# Patient Record
Sex: MALE | Race: WHITE | NOT HISPANIC OR LATINO | URBAN - METROPOLITAN AREA
[De-identification: names, ages, dates, MRNs, and addresses within clinical notes are randomized per-mention and may not be internally consistent; named-entity substitution may affect disease eponyms.]

---

## 2017-11-09 ENCOUNTER — INPATIENT (INPATIENT)
Facility: HOSPITAL | Age: 82
LOS: 18 days | Discharge: NURSING, REHAB OR LONG-TERM CARE | DRG: 329 | End: 2017-11-28
Attending: SURGERY | Admitting: HOSPITALIST
Payer: MEDICARE

## 2017-11-09 VITALS
DIASTOLIC BLOOD PRESSURE: 88 MMHG | SYSTOLIC BLOOD PRESSURE: 186 MMHG | TEMPERATURE: 98 F | RESPIRATION RATE: 19 BRPM | HEART RATE: 83 BPM | OXYGEN SATURATION: 98 %

## 2017-11-09 DIAGNOSIS — D62 ACUTE POSTHEMORRHAGIC ANEMIA: ICD-10-CM

## 2017-11-09 LAB
ALBUMIN SERPL ELPH-MCNC: 3.5 G/DL — SIGNIFICANT CHANGE UP (ref 3.3–5)
ALP SERPL-CCNC: 82 U/L — SIGNIFICANT CHANGE UP (ref 40–120)
ALT FLD-CCNC: 13 U/L RC — SIGNIFICANT CHANGE UP (ref 10–45)
ANION GAP SERPL CALC-SCNC: 14 MMOL/L — SIGNIFICANT CHANGE UP (ref 5–17)
APTT BLD: 29.1 SEC — SIGNIFICANT CHANGE UP (ref 27.5–37.4)
AST SERPL-CCNC: 23 U/L — SIGNIFICANT CHANGE UP (ref 10–40)
BASOPHILS # BLD AUTO: 0 K/UL — SIGNIFICANT CHANGE UP (ref 0–0.2)
BASOPHILS NFR BLD AUTO: 0.1 % — SIGNIFICANT CHANGE UP (ref 0–2)
BILIRUB SERPL-MCNC: 0.9 MG/DL — SIGNIFICANT CHANGE UP (ref 0.2–1.2)
BLD GP AB SCN SERPL QL: NEGATIVE — SIGNIFICANT CHANGE UP
BUN SERPL-MCNC: 20 MG/DL — SIGNIFICANT CHANGE UP (ref 7–23)
CALCIUM SERPL-MCNC: 9 MG/DL — SIGNIFICANT CHANGE UP (ref 8.4–10.5)
CHLORIDE SERPL-SCNC: 102 MMOL/L — SIGNIFICANT CHANGE UP (ref 96–108)
CO2 SERPL-SCNC: 23 MMOL/L — SIGNIFICANT CHANGE UP (ref 22–31)
CREAT SERPL-MCNC: 0.95 MG/DL — SIGNIFICANT CHANGE UP (ref 0.5–1.3)
EOSINOPHIL # BLD AUTO: 0.1 K/UL — SIGNIFICANT CHANGE UP (ref 0–0.5)
EOSINOPHIL NFR BLD AUTO: 0.9 % — SIGNIFICANT CHANGE UP (ref 0–6)
GLUCOSE SERPL-MCNC: 84 MG/DL — SIGNIFICANT CHANGE UP (ref 70–99)
HCT VFR BLD CALC: 29 % — LOW (ref 39–50)
HGB BLD-MCNC: 9.4 G/DL — LOW (ref 13–17)
INR BLD: 1.19 RATIO — HIGH (ref 0.88–1.16)
LIDOCAIN IGE QN: 26 U/L — SIGNIFICANT CHANGE UP (ref 7–60)
LYMPHOCYTES # BLD AUTO: 1.4 K/UL — SIGNIFICANT CHANGE UP (ref 1–3.3)
LYMPHOCYTES # BLD AUTO: 13.9 % — SIGNIFICANT CHANGE UP (ref 13–44)
MCHC RBC-ENTMCNC: 28.9 PG — SIGNIFICANT CHANGE UP (ref 27–34)
MCHC RBC-ENTMCNC: 32.2 GM/DL — SIGNIFICANT CHANGE UP (ref 32–36)
MCV RBC AUTO: 89.7 FL — SIGNIFICANT CHANGE UP (ref 80–100)
MONOCYTES # BLD AUTO: 0.6 K/UL — SIGNIFICANT CHANGE UP (ref 0–0.9)
MONOCYTES NFR BLD AUTO: 5.6 % — SIGNIFICANT CHANGE UP (ref 2–14)
NEUTROPHILS # BLD AUTO: 8 K/UL — HIGH (ref 1.8–7.4)
NEUTROPHILS NFR BLD AUTO: 79.3 % — HIGH (ref 43–77)
PLATELET # BLD AUTO: 274 K/UL — SIGNIFICANT CHANGE UP (ref 150–400)
POTASSIUM SERPL-MCNC: 5.2 MMOL/L — SIGNIFICANT CHANGE UP (ref 3.5–5.3)
POTASSIUM SERPL-SCNC: 5.2 MMOL/L — SIGNIFICANT CHANGE UP (ref 3.5–5.3)
PROT SERPL-MCNC: 6.1 G/DL — SIGNIFICANT CHANGE UP (ref 6–8.3)
PROTHROM AB SERPL-ACNC: 12.9 SEC — HIGH (ref 9.8–12.7)
RBC # BLD: 3.24 M/UL — LOW (ref 4.2–5.8)
RBC # FLD: 16 % — HIGH (ref 10.3–14.5)
RH IG SCN BLD-IMP: NEGATIVE — SIGNIFICANT CHANGE UP
SODIUM SERPL-SCNC: 139 MMOL/L — SIGNIFICANT CHANGE UP (ref 135–145)
WBC # BLD: 10.1 K/UL — SIGNIFICANT CHANGE UP (ref 3.8–10.5)
WBC # FLD AUTO: 10.1 K/UL — SIGNIFICANT CHANGE UP (ref 3.8–10.5)

## 2017-11-09 PROCEDURE — 38747 REMOVE ABDOMINAL LYMPH NODES: CPT | Mod: 59

## 2017-11-09 PROCEDURE — 99285 EMERGENCY DEPT VISIT HI MDM: CPT | Mod: 25,GC

## 2017-11-09 PROCEDURE — 49204: CPT

## 2017-11-09 PROCEDURE — 93010 ELECTROCARDIOGRAM REPORT: CPT

## 2017-11-09 PROCEDURE — 99223 1ST HOSP IP/OBS HIGH 75: CPT

## 2017-11-09 PROCEDURE — 44204 LAPARO PARTIAL COLECTOMY: CPT | Mod: 59

## 2017-11-09 PROCEDURE — 51702 INSERT TEMP BLADDER CATH: CPT | Mod: 59

## 2017-11-09 RX ORDER — TRAZODONE HCL 50 MG
25 TABLET ORAL ONCE
Qty: 0 | Refills: 0 | Status: COMPLETED | OUTPATIENT
Start: 2017-11-09 | End: 2017-11-10

## 2017-11-09 NOTE — H&P ADULT - PROBLEM SELECTOR PLAN 1
--suspect upper GI bleed  --patient currently refuseing colonscopy, but amenable to endoscopy  --pantoprazole 40mg IV BID  --trend Hgb q12h  --transfuse for Hgb < 7  --active type and screen, two large bore IVs  --iron studies  --NPO after MN  --GI c/s in AM

## 2017-11-09 NOTE — H&P ADULT - NSHPREVIEWOFSYSTEMS_GEN_ALL_CORE
CONSTITUTIONAL: No weakness, fevers or chills  EYES/ENT: No visual changes;  No dysphagia  NECK: No pain or stiffness  RESPIRATORY: No cough, wheezing, hemoptysis; No shortness of breath  CARDIOVASCULAR: No chest pain or palpitations; No lower extremity edema  GASTROINTESTINAL: No abdominal or epigastric pain. No nausea, vomiting, or hematemesis; No diarrhea or constipation. No melena or hematochezia.  BACK: No back pain  GENITOURINARY: No dysuria, frequency or hematuria  NEUROLOGICAL: No numbness or weakness  SKIN: No itching, burning, rashes, or lesions   All other review of systems is negative unless indicated above. CONSTITUTIONAL: No weakness, fevers or chills  EYES/ENT: No visual changes;  No dysphagia  NECK: No pain or stiffness  RESPIRATORY: No cough, wheezing, hemoptysis; No shortness of breath  CARDIOVASCULAR: No chest pain or palpitations; No lower extremity edema  GASTROINTESTINAL: +abdominal +epigastric pain. No nausea, vomiting, or hematemesis; No diarrhea or constipation. +melena  -hematochezia.  BACK: No back pain  GENITOURINARY: No dysuria, frequency or hematuria  NEUROLOGICAL: No numbness or weakness  SKIN: No itching, burning, rashes, or lesions   All other review of systems is negative unless indicated above.

## 2017-11-09 NOTE — ED PROVIDER NOTE - OBJECTIVE STATEMENT
89M PMH CAD s/p stents on ASA, plavix, HTN, HLD, possible CHF p/w 2 days of generalized abdominal pain associated with melena 89M PMH CAD s/p stents on ASA, plavix, HTN, HLD, possible CHF p/w 2 days of generalized abdominal pain associated with melena. No chest pain SOB fever chills cough

## 2017-11-09 NOTE — ED ADULT NURSE NOTE - OBJECTIVE STATEMENT
89 y.o male pmh HTN, cardiac stents presenting to ED accompanied by his son c/o generalized abdominal discomfort with diarrhea x 1 week. pt hard of hearing and verbalizes overall not feeling well and having generalized abdominal pain, son at bedside states that pt has been c/o diarrhea as well over the passed week. pt ambulatory without assistance. denies any fever, chills, nausea, vomiting, weakness. labs obtained. son remains at bedside. safety and fall precautions maintained. call bell at the bedside.

## 2017-11-09 NOTE — H&P ADULT - NSHPSOCIALHISTORY_GEN_ALL_CORE
, retired , served in [employer] during [employer].  3 children.  20 pack years, quit age of 40, glass of wine a day. , retired , served in [employer] during [employer]. Independent in ADLs. 3 children.  20 pack years, quit age of 40, glass of wine a day.

## 2017-11-09 NOTE — H&P ADULT - NSHPLABSRESULTS_GEN_ALL_CORE
Labs personally reviewed.                        9.4    10.1  )-----------( 274      ( 09 Nov 2017 20:00 )             29.0     11-09    139  |  102  |  20  ----------------------------<  84  5.2   |  23  |  0.95    Ca    9.0      09 Nov 2017 20:27    TPro  6.1  /  Alb  3.5  /  TBili  0.9  /  DBili  x   /  AST  23  /  ALT  13  /  AlkPhos  82  11-09    LIVER FUNCTIONS - ( 09 Nov 2017 20:27 )  Alb: 3.5 g/dL / Pro: 6.1 g/dL / ALK PHOS: 82 U/L / ALT: 13 U/L RC / AST: 23 U/L / GGT: x           PT/INR - ( 09 Nov 2017 19:59 )   PT: 12.9 sec;   INR: 1.19 ratio      PTT - ( 09 Nov 2017 19:59 )  PTT:29.1 sec    Imaging personally reviewed.      Tracing personally reviewed.

## 2017-11-09 NOTE — ED PROVIDER NOTE - MEDICAL DECISION MAKING DETAILS
89M CAD on ASA/ Plavix p/w melena and abd pain. No focal exam findings. Check CBC CMP Type and Screen, Coags. Likely admit. transfuse prn. HD stable currently. 89M CAD on ASA/ Plavix p/w melena and abd pain. No focal exam findings. Check CBC CMP Type and Screen, Coags. Likely admit. transfuse prn. HD stable currently.    BISI Howell MD: 89 y/o male with PMH CAD s/p stents on ASA, plavix, HTN, HLD, possible CHF p/w 2 days of abdominal pain and black stools. Per pt, he has had intermittent abd pain, epigastric x days.  He also c/o loose stools x 1 week and noticed it was black 2 days ago. Per son, pt had a w/u for anemia in [place], was advised to perform a colonoscopy, however, refused it. Pt today is amenable to endoscopy but not colonoscopy. Last colonoscopy 50 yrs ago. Denies dizziness, LH, CP, SOB, N/V, HA, cough.  Abdominal exam benign. Black, guaiac positive stool on rectal exam.  DDx: PUD, AVM, varices, other cause of GIB. Plan: basic labs, type and screen, PPI, likely admit for further w/u

## 2017-11-09 NOTE — H&P ADULT - ASSESSMENT
This is a 89 year old gentleman with history of CAD s/p stents (last 15 years ago) on ASA/plavix, HTN, HLD, possible CHF p/w 2 days of epigastric pain, melena.

## 2017-11-09 NOTE — H&P ADULT - HISTORY OF PRESENT ILLNESS
This is a 89 year old gentleman with history of CAD s/p stents on ASA/plavix, HTN, HLD, possible CHF p/w 2 days of generalized abdominal pain associated with melena. No chest pain SOB fever chills cough.    In ED, Tmax 36.6, HR 80, /80, satting well on RA.  Labs notable for WBC 10.1, Hgb 9.4, Plt 274, K 5.2, Cr 0.95, LFT WNL, INR 1.19.  Received This is a 89 year old gentleman with history of CAD s/p stents (last 15 years ago) on ASA/plavix, HTN, HLD, possible CHF p/w 2 days of epigastric pain, melena. Patient describes pain as intermittent, 8/10, not associated with eating.  He notes 3 episodes of soft black bowel movements, last ~ 16 hours ago. He denies light headedness, dizziness, SOB, chest pain, palpitations. Of note, patient shares his time between [place] and [place].  He recently came to [place] after having been worked up in [place] for anemia, found to be iron deficient.      In ED, Tmax 36.6, HR 80, /80, satting well on RA.  Labs notable for WBC 10.1, Hgb 9.4, Plt 274, K 5.2, Cr 0.95, LFT WNL, INR 1.19. This is a 89 year old gentleman with history of CAD s/p stents (last 15 years ago) on ASA/plavix, HTN, HLD, possible CHF p/w 2 days of epigastric pain, melena. Patient describes pain as intermittent, 8/10, not associated with eating.  He notes 3 episodes of soft black bowel movements, last ~ 16 hours ago. He denies light headedness, dizziness, SOB, chest pain, palpitations, nausea, vomiting, fevers, chills. Of note, patient shares his time between [place] and [place].  He recently came to [place] after having been worked up in [place] for anemia, found to be iron deficient.      In ED, Tmax 36.6, HR 80, /80, satting well on RA.  Labs notable for WBC 10.1, Hgb 9.4, Plt 274, K 5.2, Cr 0.95, LFT WNL, INR 1.19.

## 2017-11-09 NOTE — H&P ADULT - PROBLEM SELECTOR PLAN 2
--suspect gastric ulcer.  Pain currently resolved.  --if recurs, will send lactate, consider CT A/P to rule out ishemic bowel, though would expect hematochezia with bowel ischemia --suspect gastric ulcer. Symptoms and normal lipase suggest against pancreatitis. Also on DDx should be ischemic bowel, though would be more likely to cause hematochezia/BRBPR. Pain currently resolved.  --if recurs, will send lactate, consider CT A/P to rule out ishemic bowel, though would expect hematochezia with bowel ischemia

## 2017-11-09 NOTE — H&P ADULT - PROBLEM SELECTOR PLAN 4
--holding ASA, plavix in setting of possible bleed  --patient is many years away from stents, unclear why still on plavix

## 2017-11-10 DIAGNOSIS — R10.13 EPIGASTRIC PAIN: ICD-10-CM

## 2017-11-10 DIAGNOSIS — K92.1 MELENA: ICD-10-CM

## 2017-11-10 DIAGNOSIS — R63.8 OTHER SYMPTOMS AND SIGNS CONCERNING FOOD AND FLUID INTAKE: ICD-10-CM

## 2017-11-10 DIAGNOSIS — I25.10 ATHEROSCLEROTIC HEART DISEASE OF NATIVE CORONARY ARTERY WITHOUT ANGINA PECTORIS: ICD-10-CM

## 2017-11-10 DIAGNOSIS — Z29.9 ENCOUNTER FOR PROPHYLACTIC MEASURES, UNSPECIFIED: ICD-10-CM

## 2017-11-10 DIAGNOSIS — I10 ESSENTIAL (PRIMARY) HYPERTENSION: ICD-10-CM

## 2017-11-10 DIAGNOSIS — D64.9 ANEMIA, UNSPECIFIED: ICD-10-CM

## 2017-11-10 DIAGNOSIS — E78.5 HYPERLIPIDEMIA, UNSPECIFIED: ICD-10-CM

## 2017-11-10 LAB
ANION GAP SERPL CALC-SCNC: 12 MMOL/L — SIGNIFICANT CHANGE UP (ref 5–17)
BASOPHILS # BLD AUTO: 0.03 K/UL — SIGNIFICANT CHANGE UP (ref 0–0.2)
BASOPHILS NFR BLD AUTO: 0.4 % — SIGNIFICANT CHANGE UP (ref 0–2)
BUN SERPL-MCNC: 17 MG/DL — SIGNIFICANT CHANGE UP (ref 7–23)
CALCIUM SERPL-MCNC: 8.4 MG/DL — SIGNIFICANT CHANGE UP (ref 8.4–10.5)
CHLORIDE SERPL-SCNC: 105 MMOL/L — SIGNIFICANT CHANGE UP (ref 96–108)
CO2 SERPL-SCNC: 24 MMOL/L — SIGNIFICANT CHANGE UP (ref 22–31)
CREAT SERPL-MCNC: 0.98 MG/DL — SIGNIFICANT CHANGE UP (ref 0.5–1.3)
EOSINOPHIL # BLD AUTO: 0.17 K/UL — SIGNIFICANT CHANGE UP (ref 0–0.5)
EOSINOPHIL NFR BLD AUTO: 2.5 % — SIGNIFICANT CHANGE UP (ref 0–6)
GLUCOSE SERPL-MCNC: 75 MG/DL — SIGNIFICANT CHANGE UP (ref 70–99)
HCT VFR BLD CALC: 26.9 % — LOW (ref 39–50)
HCT VFR BLD CALC: 31.5 % — LOW (ref 39–50)
HGB BLD-MCNC: 8.3 G/DL — LOW (ref 13–17)
HGB BLD-MCNC: 9.9 G/DL — LOW (ref 13–17)
IMM GRANULOCYTES NFR BLD AUTO: 0.3 % — SIGNIFICANT CHANGE UP (ref 0–1.5)
LYMPHOCYTES # BLD AUTO: 1.02 K/UL — SIGNIFICANT CHANGE UP (ref 1–3.3)
LYMPHOCYTES # BLD AUTO: 15.2 % — SIGNIFICANT CHANGE UP (ref 13–44)
MCHC RBC-ENTMCNC: 26.6 PG — LOW (ref 27–34)
MCHC RBC-ENTMCNC: 27.3 PG — SIGNIFICANT CHANGE UP (ref 27–34)
MCHC RBC-ENTMCNC: 30.9 GM/DL — LOW (ref 32–36)
MCHC RBC-ENTMCNC: 31.4 GM/DL — LOW (ref 32–36)
MCV RBC AUTO: 86.2 FL — SIGNIFICANT CHANGE UP (ref 80–100)
MCV RBC AUTO: 86.8 FL — SIGNIFICANT CHANGE UP (ref 80–100)
MONOCYTES # BLD AUTO: 0.52 K/UL — SIGNIFICANT CHANGE UP (ref 0–0.9)
MONOCYTES NFR BLD AUTO: 7.7 % — SIGNIFICANT CHANGE UP (ref 2–14)
NEUTROPHILS # BLD AUTO: 4.95 K/UL — SIGNIFICANT CHANGE UP (ref 1.8–7.4)
NEUTROPHILS NFR BLD AUTO: 73.9 % — SIGNIFICANT CHANGE UP (ref 43–77)
PLATELET # BLD AUTO: 250 K/UL — SIGNIFICANT CHANGE UP (ref 150–400)
PLATELET # BLD AUTO: 300 K/UL — SIGNIFICANT CHANGE UP (ref 150–400)
POTASSIUM SERPL-MCNC: 4.2 MMOL/L — SIGNIFICANT CHANGE UP (ref 3.5–5.3)
POTASSIUM SERPL-SCNC: 4.2 MMOL/L — SIGNIFICANT CHANGE UP (ref 3.5–5.3)
RBC # BLD: 3.12 M/UL — LOW (ref 4.2–5.8)
RBC # BLD: 3.63 M/UL — LOW (ref 4.2–5.8)
RBC # FLD: 17.1 % — HIGH (ref 10.3–14.5)
RBC # FLD: 17.5 % — HIGH (ref 10.3–14.5)
SODIUM SERPL-SCNC: 141 MMOL/L — SIGNIFICANT CHANGE UP (ref 135–145)
WBC # BLD: 10.2 K/UL — SIGNIFICANT CHANGE UP (ref 3.8–10.5)
WBC # BLD: 6.71 K/UL — SIGNIFICANT CHANGE UP (ref 3.8–10.5)
WBC # FLD AUTO: 10.2 K/UL — SIGNIFICANT CHANGE UP (ref 3.8–10.5)
WBC # FLD AUTO: 6.71 K/UL — SIGNIFICANT CHANGE UP (ref 3.8–10.5)

## 2017-11-10 RX ORDER — FERROUS SULFATE 325(65) MG
1 TABLET ORAL
Qty: 0 | Refills: 0 | COMMUNITY

## 2017-11-10 RX ORDER — ISOSORBIDE MONONITRATE 60 MG/1
1 TABLET, EXTENDED RELEASE ORAL
Qty: 0 | Refills: 0 | COMMUNITY

## 2017-11-10 RX ORDER — ATORVASTATIN CALCIUM 80 MG/1
40 TABLET, FILM COATED ORAL AT BEDTIME
Qty: 0 | Refills: 0 | Status: DISCONTINUED | OUTPATIENT
Start: 2017-11-10 | End: 2017-11-20

## 2017-11-10 RX ORDER — FERROUS SULFATE 325(65) MG
325 TABLET ORAL
Qty: 0 | Refills: 0 | Status: DISCONTINUED | OUTPATIENT
Start: 2017-11-10 | End: 2017-11-20

## 2017-11-10 RX ORDER — FUROSEMIDE 40 MG
1 TABLET ORAL
Qty: 0 | Refills: 0 | COMMUNITY

## 2017-11-10 RX ORDER — ASCORBIC ACID 60 MG
500 TABLET,CHEWABLE ORAL DAILY
Qty: 0 | Refills: 0 | Status: DISCONTINUED | OUTPATIENT
Start: 2017-11-10 | End: 2017-11-20

## 2017-11-10 RX ORDER — ATORVASTATIN CALCIUM 80 MG/1
1 TABLET, FILM COATED ORAL
Qty: 0 | Refills: 0 | COMMUNITY

## 2017-11-10 RX ORDER — ASPIRIN/CALCIUM CARB/MAGNESIUM 324 MG
81 TABLET ORAL DAILY
Qty: 0 | Refills: 0 | Status: DISCONTINUED | OUTPATIENT
Start: 2017-11-10 | End: 2017-11-10

## 2017-11-10 RX ORDER — FUROSEMIDE 40 MG
20 TABLET ORAL EVERY OTHER DAY
Qty: 0 | Refills: 0 | Status: DISCONTINUED | OUTPATIENT
Start: 2017-11-10 | End: 2017-11-20

## 2017-11-10 RX ORDER — CLOPIDOGREL BISULFATE 75 MG/1
1 TABLET, FILM COATED ORAL
Qty: 0 | Refills: 0 | COMMUNITY

## 2017-11-10 RX ORDER — CARVEDILOL PHOSPHATE 80 MG/1
1 CAPSULE, EXTENDED RELEASE ORAL
Qty: 0 | Refills: 0 | COMMUNITY

## 2017-11-10 RX ORDER — DOCUSATE SODIUM 100 MG
1 CAPSULE ORAL
Qty: 0 | Refills: 0 | COMMUNITY

## 2017-11-10 RX ORDER — CARVEDILOL PHOSPHATE 80 MG/1
6.25 CAPSULE, EXTENDED RELEASE ORAL EVERY 12 HOURS
Qty: 0 | Refills: 0 | Status: DISCONTINUED | OUTPATIENT
Start: 2017-11-10 | End: 2017-11-20

## 2017-11-10 RX ORDER — PANTOPRAZOLE SODIUM 20 MG/1
40 TABLET, DELAYED RELEASE ORAL
Qty: 0 | Refills: 0 | Status: DISCONTINUED | OUTPATIENT
Start: 2017-11-10 | End: 2017-11-10

## 2017-11-10 RX ORDER — ASCORBIC ACID 60 MG
1 TABLET,CHEWABLE ORAL
Qty: 0 | Refills: 0 | COMMUNITY

## 2017-11-10 RX ADMIN — Medication 25 MILLIGRAM(S): at 22:28

## 2017-11-10 RX ADMIN — ATORVASTATIN CALCIUM 40 MILLIGRAM(S): 80 TABLET, FILM COATED ORAL at 22:28

## 2017-11-10 RX ADMIN — Medication 20 MILLIGRAM(S): at 19:43

## 2017-11-10 RX ADMIN — CARVEDILOL PHOSPHATE 6.25 MILLIGRAM(S): 80 CAPSULE, EXTENDED RELEASE ORAL at 07:03

## 2017-11-10 RX ADMIN — CARVEDILOL PHOSPHATE 6.25 MILLIGRAM(S): 80 CAPSULE, EXTENDED RELEASE ORAL at 19:45

## 2017-11-10 RX ADMIN — Medication 1 TABLET(S): at 19:42

## 2017-11-10 RX ADMIN — PANTOPRAZOLE SODIUM 40 MILLIGRAM(S): 20 TABLET, DELAYED RELEASE ORAL at 07:25

## 2017-11-10 RX ADMIN — Medication 325 MILLIGRAM(S): at 19:46

## 2017-11-10 NOTE — PROGRESS NOTE ADULT - SUBJECTIVE AND OBJECTIVE BOX
Pre-Endoscopy Evaluation      Referring Physician:   Dr. Solano                            Procedure: EGD    Indication for Procedure: GIB    Pertinent History:  89 year old male with history of CAD s/p CABG stents (last 15 years ago) on ASA/plavix, HTN, HLD, 5 days of dark stool melena. Patient describes pain as intermittent, 8/10, not associated with eating.        Sedation by Anesthesia [X]    PAST MEDICAL & SURGICAL HISTORY:  HLD (hyperlipidemia)  Hypertension  CAD (coronary artery disease)  No significant past surgical history      PMH of Gastroparesis [ ]  Gastric Surgery [ ]  Gastric Outlet Obstruction [ ]    Allergies:    No Known Allergies    Intolerances:    Latex allergy: [ ] yes [X] no    Medications:MEDICATIONS  (STANDING):  ascorbic acid 500 milliGRAM(s) Oral daily  atorvastatin 40 milliGRAM(s) Oral at bedtime  carvedilol 6.25 milliGRAM(s) Oral every 12 hours  ferrous    sulfate 325 milliGRAM(s) Oral two times a day with meals  furosemide    Tablet 20 milliGRAM(s) Oral every other day  multivitamin 1 Tablet(s) Oral daily  pantoprazole  Injectable 40 milliGRAM(s) IV Push two times a day  traZODone 25 milliGRAM(s) Oral once    MEDICATIONS  (PRN):      Smoking: [ ] yes  [X] no    AICD/PPM: [ ] yes   [X] no    Pertinent lab data:                        8.3    6.71  )-----------( 250      ( 10 Nov 2017 08:40 )             26.9     11-10    141  |  105  |  17  ----------------------------<  75  4.2   |  24  |  0.98    Ca    8.4      10 Nov 2017 08:34    TPro  6.1  /  Alb  3.5  /  TBili  0.9  /  DBili  x   /  AST  23  /  ALT  13  /  AlkPhos  82  11-09    PT/INR - ( 09 Nov 2017 19:59 )   PT: 12.9 sec;   INR: 1.19 ratio      PTT - ( 09 Nov 2017 19:59 )  PTT:29.1 sec      Physical Examination:     Daily   Vital Signs Last 24 Hrs  T(C): 36.5 (10 Nov 2017 06:35), Max: 36.6 (09 Nov 2017 18:23)  T(F): 97.7 (10 Nov 2017 06:35), Max: 97.8 (09 Nov 2017 18:23)  HR: 74 (10 Nov 2017 06:35) (74 - 88)  BP: 182/82 (10 Nov 2017 06:35) (168/80 - 186/88)  BP(mean): --  RR: 18 (10 Nov 2017 06:35) (18 - 19)  SpO2: 96% (10 Nov 2017 06:35) (96% - 99%)      Constitutional: NAD    Neck:  No JVD    Respiratory: CTAB/L    Cardiovascular: S1 and S2    Gastrointestinal: BS+, soft, NT/ND    Extremities: No peripheral edema    Neurological: A/O x 3, no focal deficits    : No Devine    Skin: No rashes    Comments:    ASA Class: I [ ]  II [ ]  III [ ]  IV [X]    The patient is a suitable candidate for the planned procedure unless box checked [ ]  No, explain:

## 2017-11-10 NOTE — PROGRESS NOTE ADULT - PROBLEM SELECTOR PLAN 1
--suspect upper GI bleed  -- s/p EGD with no clear etiology of melena.  -- unable to swallow the capsule.   -- c/w pantoprazole 40mg IV BID  -- trend Hgb q12h  -- transfuse if Hgb < 7  -- active type and screen, two large bore IVs  -- iron studies  -- GI eval appreciated.   -- Tentative plan for colonoscopy on Monday.   -- The sons to discuss.

## 2017-11-10 NOTE — CONSULT NOTE ADULT - SUBJECTIVE AND OBJECTIVE BOX
Patient is a 89y old  Male who presents with a chief complaint of melena, abdominal pain (09 Nov 2017 23:38)      HPI:  This is a 89 year old gentleman with history of CAD s/p CABG stents (last 15 years ago) on ASA/plavix, HTN, HLD, 5 days of dark stool melena. Patient describes pain as intermittent, 8/10, not associated with eating.  He notes 3 episodes of soft black bowel movements, last ~ 16 hours prior to admission. He denies light headedness, dizziness, SOB, chest pain, palpitations, nausea, vomiting, fevers, chills. Of note, patient shares his time between [place] and [place].  He recently came to [place] after having been worked up in [place] for anemia, found to be iron deficient.   History of hemorrhoidal bleeding 2016.  No recent EGD or colonoscopy.    Cardioloist called Dr Pierce ([phone]) Echo from 2015 stable from 2013 with normal EF, no stenotic valves. right carotid stenosis~ 50-60%.    Hb 9.4 MCV 88. unknown baseline.      PAST MEDICAL & SURGICAL HISTORY:  HLD (hyperlipidemia)  Hypertension  CAD (coronary artery disease)  No significant past surgical history      MEDICATIONS  (STANDING):  ascorbic acid 500 milliGRAM(s) Oral daily  atorvastatin 40 milliGRAM(s) Oral at bedtime  carvedilol 6.25 milliGRAM(s) Oral every 12 hours  ferrous    sulfate 325 milliGRAM(s) Oral two times a day with meals  furosemide    Tablet 20 milliGRAM(s) Oral every other day  multivitamin 1 Tablet(s) Oral daily  pantoprazole  Injectable 40 milliGRAM(s) IV Push two times a day  traZODone 25 milliGRAM(s) Oral once    MEDICATIONS  (PRN):      Allergies    No Known Allergies    Intolerances        Review of Systems:    General:  No wt loss, fevers, chills, night sweats,fatigue,   CV:  No pain, palpitations, hypo/hypertension  Resp:  No dyspnea, cough, tachypnea, wheezing  GI:  See HPI  :  No pain, bleeding, incontinence, nocturia  Muscle:  No pain, weakness  Neuro:  No weakness, tingling, memory problems. [place]  Psych:  No fatigue, insomnia, mood problems, depression  Endocrine:  No polyuria, polydypsia, cold/heat intolerance  Heme:  No petechiae, ecchymosis, easy bruisability  Skin:  No rash, tattoos, scars, edema      Vital Signs Last 24 Hrs  T(C): 36.5 (10 Nov 2017 06:35), Max: 36.6 (09 Nov 2017 18:23)  T(F): 97.7 (10 Nov 2017 06:35), Max: 97.8 (09 Nov 2017 18:23)  HR: 74 (10 Nov 2017 06:35) (74 - 88)  BP: 182/82 (10 Nov 2017 06:35) (168/80 - 186/88)  BP(mean): --  RR: 18 (10 Nov 2017 06:35) (18 - 19)  SpO2: 96% (10 Nov 2017 06:35) (96% - 99%)    PHYSICAL EXAM:    Constitutional: NAD, well-developed  Neck: No LAD, supple  Respiratory: CTA and P  Cardiovascular: S1 and S2, RRR, no M  Gastrointestinal: BS+, soft, NT/ND, neg HSM,  Extremities: No peripheral edema, neg clubbing, cyanosis  Vascular: 2+ peripheral pulses  Neurological: A/O x 3, Hard of hearing  Psychiatric: Normal mood, normal affect  Skin: No rashes      LABS:                        8.3    6.71  )-----------( 250      ( 10 Nov 2017 08:40 )             26.9                         9.4    10.1  )-----------( 274      ( 09 Nov 2017 20:00 )             29.0     11-09    139  |  102  |  20  ----------------------------<  84  5.2   |  23  |  0.95    Ca    9.0      09 Nov 2017 20:27    TPro  6.1  /  Alb  3.5  /  TBili  0.9  /  DBili  x   /  AST  23  /  ALT  13  /  AlkPhos  82  11-09    PT/INR - ( 09 Nov 2017 19:59 )   PT: 12.9 sec;   INR: 1.19 ratio         PTT - ( 09 Nov 2017 19:59 )  PTT:29.1 sec    LIVER FUNCTIONS - ( 09 Nov 2017 20:27 )  Alb: 3.5 g/dL / Pro: 6.1 g/dL / ALK PHOS: 82 U/L / ALT: 13 U/L RC / AST: 23 U/L / GGT: x           Lipase, Serum: 26 U/L (11-09-17 @ 20:27)        RADIOLOGY & ADDITIONAL TESTS:

## 2017-11-10 NOTE — CONSULT NOTE ADULT - ASSESSMENT
Melena in the setting of CAD ASA/Plavix use. R/O upper GI source.  Antiplatelet agents held on PPI IV BID.    Cardiac status reviewed with good cardiac function.    Maintain NPO. Plan for EGD for evaluation today.  CBC q12h.

## 2017-11-10 NOTE — PROGRESS NOTE ADULT - SUBJECTIVE AND OBJECTIVE BOX
Patient is a 89y old  Male who presents with a chief complaint of melena, abdominal pain (09 Nov 2017 23:38)      SUBJECTIVE / OVERNIGHT EVENTS:  Pt seen and examined at bedside. Feels well.   No chest pain, no shortness of breath.   No overnight event. s/p EGD this afternoon. Didn't tolerate capsule study. unable to swallow the camera.   No N/V/D. No abdominal pain.  The son at bedside.       Vital Signs Last 24 Hrs  T(C): 36.5 (10 Nov 2017 14:34), Max: 36.6 (09 Nov 2017 18:23)  T(F): 97.7 (10 Nov 2017 14:34), Max: 97.8 (09 Nov 2017 18:23)  HR: 81 (10 Nov 2017 14:34) (74 - 88)  BP: 150/76 (10 Nov 2017 14:34) (150/76 - 186/88)  BP(mean): --  RR: 81 (10 Nov 2017 14:34) (18 - 81)  SpO2: 96% (10 Nov 2017 14:34) (96% - 99%)  I&O's Summary    09 Nov 2017 07:01  -  10 Nov 2017 07:00  --------------------------------------------------------  IN: 120 mL / OUT: 400 mL / NET: -280 mL        PHYSICAL EXAM:  GENERAL: NAD, Comfortable  HEAD:  Atraumatic, Normocephalic  EYES: EOMI, PERRLA, conjunctiva and sclera clear  NECK: Supple, No JVD  CHEST/LUNG: Clear to auscultation bilaterally; No wheeze  HEART: Regular rate and rhythm; No murmurs, rubs, or gallops  ABDOMEN: Soft, Nontender, Nondistended; Bowel sounds present  Neuro: AAO x 2-3, no focal deficit, 5/5 b/l extremities  EXTREMITIES:  2+ Peripheral Pulses, No clubbing, cyanosis, or edema  SKIN: No rashes or lesions    LABS:                        8.3    6.71  )-----------( 250      ( 10 Nov 2017 08:40 )             26.9     11-10    141  |  105  |  17  ----------------------------<  75  4.2   |  24  |  0.98    Ca    8.4      10 Nov 2017 08:34    TPro  6.1  /  Alb  3.5  /  TBili  0.9  /  DBili  x   /  AST  23  /  ALT  13  /  AlkPhos  82  11-09    PT/INR - ( 09 Nov 2017 19:59 )   PT: 12.9 sec;   INR: 1.19 ratio         PTT - ( 09 Nov 2017 19:59 )  PTT:29.1 sec  CAPILLARY BLOOD GLUCOSE                RADIOLOGY & ADDITIONAL TESTS:    Imaging Personally Reviewed:  [x] YES  [ ] NO    Consultant(s) Notes Reviewed:  [x] YES  [ ] NO      MEDICATIONS  (STANDING):  ascorbic acid 500 milliGRAM(s) Oral daily  atorvastatin 40 milliGRAM(s) Oral at bedtime  carvedilol 6.25 milliGRAM(s) Oral every 12 hours  ferrous    sulfate 325 milliGRAM(s) Oral two times a day with meals  furosemide    Tablet 20 milliGRAM(s) Oral every other day  multivitamin 1 Tablet(s) Oral daily  traZODone 25 milliGRAM(s) Oral once    MEDICATIONS  (PRN):      Care Discussed with Consultants/Other Providers [x] YES  [ ] NO    HEALTH ISSUES - PROBLEM Dx:  Nutrition, metabolism, and development symptoms: Nutrition, metabolism, and development symptoms  Prophylactic measure: Prophylactic measure  Essential hypertension: Essential hypertension  HLD (hyperlipidemia): HLD (hyperlipidemia)  CAD (coronary artery disease): CAD (coronary artery disease)  Anemia: Anemia  Epigastric abdominal pain: Epigastric abdominal pain  Melena: Melena

## 2017-11-10 NOTE — PROGRESS NOTE ADULT - ASSESSMENT
89 year old gentleman with history of CAD s/p stents (last 15 years ago) on ASA/plavix, HTN, HLD, possible CHF p/w 2 days of epigastric pain, melena.

## 2017-11-11 LAB
ANION GAP SERPL CALC-SCNC: 13 MMOL/L — SIGNIFICANT CHANGE UP (ref 5–17)
BUN SERPL-MCNC: 18 MG/DL — SIGNIFICANT CHANGE UP (ref 7–23)
CALCIUM SERPL-MCNC: 7.9 MG/DL — LOW (ref 8.4–10.5)
CHLORIDE SERPL-SCNC: 101 MMOL/L — SIGNIFICANT CHANGE UP (ref 96–108)
CO2 SERPL-SCNC: 24 MMOL/L — SIGNIFICANT CHANGE UP (ref 22–31)
CREAT SERPL-MCNC: 0.99 MG/DL — SIGNIFICANT CHANGE UP (ref 0.5–1.3)
GLUCOSE SERPL-MCNC: 93 MG/DL — SIGNIFICANT CHANGE UP (ref 70–99)
HCT VFR BLD CALC: 28.8 % — LOW (ref 39–50)
HGB BLD-MCNC: 8.8 G/DL — LOW (ref 13–17)
MCHC RBC-ENTMCNC: 26.7 PG — LOW (ref 27–34)
MCHC RBC-ENTMCNC: 30.6 GM/DL — LOW (ref 32–36)
MCV RBC AUTO: 87.3 FL — SIGNIFICANT CHANGE UP (ref 80–100)
PLATELET # BLD AUTO: 266 K/UL — SIGNIFICANT CHANGE UP (ref 150–400)
POTASSIUM SERPL-MCNC: 4 MMOL/L — SIGNIFICANT CHANGE UP (ref 3.5–5.3)
POTASSIUM SERPL-SCNC: 4 MMOL/L — SIGNIFICANT CHANGE UP (ref 3.5–5.3)
RBC # BLD: 3.3 M/UL — LOW (ref 4.2–5.8)
RBC # FLD: 16.9 % — HIGH (ref 10.3–14.5)
SODIUM SERPL-SCNC: 138 MMOL/L — SIGNIFICANT CHANGE UP (ref 135–145)
WBC # BLD: 10.67 K/UL — HIGH (ref 3.8–10.5)
WBC # FLD AUTO: 10.67 K/UL — HIGH (ref 3.8–10.5)

## 2017-11-11 RX ADMIN — Medication 325 MILLIGRAM(S): at 18:06

## 2017-11-11 RX ADMIN — CARVEDILOL PHOSPHATE 6.25 MILLIGRAM(S): 80 CAPSULE, EXTENDED RELEASE ORAL at 18:06

## 2017-11-11 RX ADMIN — Medication 1 TABLET(S): at 12:20

## 2017-11-11 RX ADMIN — CARVEDILOL PHOSPHATE 6.25 MILLIGRAM(S): 80 CAPSULE, EXTENDED RELEASE ORAL at 06:28

## 2017-11-11 RX ADMIN — Medication 325 MILLIGRAM(S): at 09:05

## 2017-11-11 RX ADMIN — ATORVASTATIN CALCIUM 40 MILLIGRAM(S): 80 TABLET, FILM COATED ORAL at 22:45

## 2017-11-11 RX ADMIN — Medication 500 MILLIGRAM(S): at 12:20

## 2017-11-11 NOTE — PROGRESS NOTE ADULT - SUBJECTIVE AND OBJECTIVE BOX
INTERVAL HPI/OVERNIGHT EVENTS:  feels well no complaints. no bm.     MEDICATIONS  (STANDING):  ascorbic acid 500 milliGRAM(s) Oral daily  atorvastatin 40 milliGRAM(s) Oral at bedtime  carvedilol 6.25 milliGRAM(s) Oral every 12 hours  ferrous    sulfate 325 milliGRAM(s) Oral two times a day with meals  furosemide    Tablet 20 milliGRAM(s) Oral every other day  multivitamin 1 Tablet(s) Oral daily    MEDICATIONS  (PRN):      Allergies    No Known Allergies    Intolerances        Vital Signs Last 24 Hrs  T(C): 36.4 (11 Nov 2017 13:30), Max: 36.8 (11 Nov 2017 06:22)  T(F): 97.5 (11 Nov 2017 13:30), Max: 98.3 (11 Nov 2017 06:22)  HR: 59 (11 Nov 2017 13:30) (55 - 81)  BP: 133/63 (11 Nov 2017 13:30) (110/61 - 150/76)  BP(mean): --  RR: 18 (11 Nov 2017 13:30) (18 - 81)  SpO2: 99% (11 Nov 2017 13:30) (95% - 99%)    PHYSICAL EXAM:  General: comfortable in No acute distress  CV: regular rate and rhythm. no murmurs rubs or gallops  Lungs: clear to ascultation bilaterally  abdomen: soft nontender nondistened normal bowel sounds  ext: negative edema  skin: no rashes noted          LABS:                        8.8    10.67 )-----------( 266      ( 11 Nov 2017 08:26 )             28.8     11-11    138  |  101  |  18  ----------------------------<  93  4.0   |  24  |  0.99    Ca    7.9<L>      11 Nov 2017 08:37    TPro  6.1  /  Alb  3.5  /  TBili  0.9  /  DBili  x   /  AST  23  /  ALT  13  /  AlkPhos  82  11-09    PT/INR - ( 09 Nov 2017 19:59 )   PT: 12.9 sec;   INR: 1.19 ratio         PTT - ( 09 Nov 2017 19:59 )  PTT:29.1 sec    LIVER FUNCTIONS - ( 09 Nov 2017 20:27 )  Alb: 3.5 g/dL / Pro: 6.1 g/dL / ALK PHOS: 82 U/L / ALT: 13 U/L RC / AST: 23 U/L / GGT: x           Hemoglobin: 8.8 g/dL (11-11-17 @ 08:26)  Hemoglobin: 9.9 g/dL (11-10-17 @ 21:04)  Hemoglobin: 8.3 g/dL (11-10-17 @ 08:40)  Hemoglobin: 9.4 g/dL (11-09-17 @ 20:00)    RADIOLOGY & ADDITIONAL TESTS:

## 2017-11-11 NOTE — PHYSICAL THERAPY INITIAL EVALUATION ADULT - PERTINENT HX OF CURRENT PROBLEM, REHAB EVAL
This is a 89 year old gentleman p/w 2 days of epigastric pain, melena. Patient describes pain as intermittent, 8/10, not associated with eating.  He notes 3 episodes of soft black bowel movements..

## 2017-11-11 NOTE — PHYSICAL THERAPY INITIAL EVALUATION ADULT - ADDITIONAL COMMENTS
resides in pvt house with son ,4 steps to get in. IND ambulation indoor /out door without assistive device.

## 2017-11-11 NOTE — PROGRESS NOTE ADULT - ASSESSMENT
89 year old man with melena 2nd to gi bleed. negative egd  given known stents requiring antiplatelet agents as well as overall good health even given age appropriate to proceed with colonoscopy and possible egd with placement of capsule for monday  liquid diet tomorrow  will order prep tomorrow  trend cbc daily  patient and son agree with plan.   call with questions [phone]

## 2017-11-11 NOTE — PROGRESS NOTE ADULT - SUBJECTIVE AND OBJECTIVE BOX
Patient is a 89y old  Male who presents with a chief complaint of melena, abdominal pain (09 Nov 2017 23:38)      SUBJECTIVE / OVERNIGHT EVENTS:  Pt seen and examined at bedside.   No overnight event. Doing well. No further BM.   Feeling better.  no cp, no sob, no n/v/d.   Hgb remained stable.   The son at bedside.         Vital Signs Last 24 Hrs  T(C): 36.4 (11 Nov 2017 13:30), Max: 36.8 (11 Nov 2017 06:22)  T(F): 97.5 (11 Nov 2017 13:30), Max: 98.3 (11 Nov 2017 06:22)  HR: 59 (11 Nov 2017 13:30) (55 - 81)  BP: 133/63 (11 Nov 2017 13:30) (110/61 - 150/76)  BP(mean): --  RR: 18 (11 Nov 2017 13:30) (18 - 81)  SpO2: 99% (11 Nov 2017 13:30) (95% - 99%)  I&O's Summary    10 Nov 2017 07:01  -  11 Nov 2017 07:00  --------------------------------------------------------  IN: 240 mL / OUT: 800 mL / NET: -560 mL    11 Nov 2017 07:01  -  11 Nov 2017 14:32  --------------------------------------------------------  IN: 480 mL / OUT: 100 mL / NET: 380 mL        PHYSICAL EXAM:  GENERAL: NAD, Comfortable  HEAD:  Atraumatic, Normocephalic  EYES: EOMI, PERRLA, conjunctiva and sclera clear  NECK: Supple, No JVD  CHEST/LUNG: Clear to auscultation bilaterally; No wheeze  HEART: Regular rate and rhythm; No murmurs, rubs, or gallops  ABDOMEN: Soft, Nontender, Nondistended; Bowel sounds present  Neuro: AAO x 3, no focal deficit, 5/5 b/l extremities  EXTREMITIES:  2+ Peripheral Pulses, No clubbing, cyanosis, or edema  SKIN: No rashes or lesions    LABS:                        8.8    10.67 )-----------( 266      ( 11 Nov 2017 08:26 )             28.8     11-11    138  |  101  |  18  ----------------------------<  93  4.0   |  24  |  0.99    Ca    7.9<L>      11 Nov 2017 08:37    TPro  6.1  /  Alb  3.5  /  TBili  0.9  /  DBili  x   /  AST  23  /  ALT  13  /  AlkPhos  82  11-09    PT/INR - ( 09 Nov 2017 19:59 )   PT: 12.9 sec;   INR: 1.19 ratio         PTT - ( 09 Nov 2017 19:59 )  PTT:29.1 sec  CAPILLARY BLOOD GLUCOSE                RADIOLOGY & ADDITIONAL TESTS:    Imaging Personally Reviewed:  [x] YES  [ ] NO    Consultant(s) Notes Reviewed:  [x] YES  [ ] NO      MEDICATIONS  (STANDING):  ascorbic acid 500 milliGRAM(s) Oral daily  atorvastatin 40 milliGRAM(s) Oral at bedtime  carvedilol 6.25 milliGRAM(s) Oral every 12 hours  ferrous    sulfate 325 milliGRAM(s) Oral two times a day with meals  furosemide    Tablet 20 milliGRAM(s) Oral every other day  multivitamin 1 Tablet(s) Oral daily    MEDICATIONS  (PRN):      Care Discussed with Consultants/Other Providers [x] YES  [ ] NO    HEALTH ISSUES - PROBLEM Dx:  Nutrition, metabolism, and development symptoms: Nutrition, metabolism, and development symptoms  Prophylactic measure: Prophylactic measure  Essential hypertension: Essential hypertension  HLD (hyperlipidemia): HLD (hyperlipidemia)  CAD (coronary artery disease): CAD (coronary artery disease)  Anemia: Anemia  Epigastric abdominal pain: Epigastric abdominal pain  Melena: Melena

## 2017-11-11 NOTE — PROGRESS NOTE ADULT - PROBLEM SELECTOR PLAN 1
-- s/p EGD with no clear etiology of melena.  -- unable to swallow the capsule.   -- c/w pantoprazole 40mg IV BID  -- trend Hgb qdaily  -- transfuse if Hgb < 7  -- active type and screen, two large bore IVs  -- iron studies  -- GI eval appreciated.   -- Tentative plan for colonoscopy on Monday.  plus EGD with capsule placement.   The family and pt agreeable.

## 2017-11-12 RX ORDER — SOD SULF/SODIUM/NAHCO3/KCL/PEG
2000 SOLUTION, RECONSTITUTED, ORAL ORAL ONCE
Qty: 0 | Refills: 0 | Status: DISCONTINUED | OUTPATIENT
Start: 2017-11-12 | End: 2017-11-12

## 2017-11-12 RX ORDER — SOD SULF/SODIUM/NAHCO3/KCL/PEG
2000 SOLUTION, RECONSTITUTED, ORAL ORAL ONCE
Qty: 0 | Refills: 0 | Status: COMPLETED | OUTPATIENT
Start: 2017-11-12 | End: 2017-11-12

## 2017-11-12 RX ADMIN — CARVEDILOL PHOSPHATE 6.25 MILLIGRAM(S): 80 CAPSULE, EXTENDED RELEASE ORAL at 06:50

## 2017-11-12 RX ADMIN — Medication 20 MILLIGRAM(S): at 11:37

## 2017-11-12 RX ADMIN — Medication 500 MILLIGRAM(S): at 11:37

## 2017-11-12 RX ADMIN — ATORVASTATIN CALCIUM 40 MILLIGRAM(S): 80 TABLET, FILM COATED ORAL at 21:49

## 2017-11-12 RX ADMIN — Medication 325 MILLIGRAM(S): at 09:15

## 2017-11-12 RX ADMIN — Medication 1 TABLET(S): at 11:36

## 2017-11-12 RX ADMIN — Medication 325 MILLIGRAM(S): at 18:24

## 2017-11-12 RX ADMIN — Medication 2000 MILLILITER(S): at 18:25

## 2017-11-12 RX ADMIN — CARVEDILOL PHOSPHATE 6.25 MILLIGRAM(S): 80 CAPSULE, EXTENDED RELEASE ORAL at 18:24

## 2017-11-12 NOTE — PROVIDER CONTACT NOTE (OTHER) - RECOMMENDATIONS
Requesting change of diet from regular to clear liquid in preparation for colonoscopy on Monday 11/13

## 2017-11-12 NOTE — PROGRESS NOTE ADULT - SUBJECTIVE AND OBJECTIVE BOX
Patient is a 89y old  Male who presents with a chief complaint of melena, abdominal pain (09 Nov 2017 23:38)      SUBJECTIVE / OVERNIGHT EVENTS:  No overnight events.  No complaints. No melena.  No cp, sob, abdominal pain.  No n/v/d. no HA/dizziness.   The son and the family at bedside.     Vital Signs Last 24 Hrs  T(C): 36.3 (12 Nov 2017 19:52), Max: 36.7 (12 Nov 2017 04:28)  T(F): 97.4 (12 Nov 2017 19:52), Max: 98.1 (12 Nov 2017 04:28)  HR: 72 (12 Nov 2017 19:52) (50 - 72)  BP: 144/75 (12 Nov 2017 19:52) (119/56 - 144/75)  BP(mean): --  RR: 18 (12 Nov 2017 19:52) (18 - 18)  SpO2: 99% (12 Nov 2017 19:52) (96% - 100%)  I&O's Summary    11 Nov 2017 07:01  -  12 Nov 2017 07:00  --------------------------------------------------------  IN: 1020 mL / OUT: 100 mL / NET: 920 mL    12 Nov 2017 07:01  -  12 Nov 2017 20:12  --------------------------------------------------------  IN: 1960 mL / OUT: 0 mL / NET: 1960 mL        PHYSICAL EXAM:  GENERAL: NAD, Comfortable  HEAD:  Atraumatic, Normocephalic  EYES: EOMI, PERRLA, conjunctiva and sclera clear  NECK: Supple, No JVD  CHEST/LUNG: Clear to auscultation bilaterally; No wheeze  HEART: Regular rate and rhythm; No murmurs, rubs, or gallops  ABDOMEN: Soft, Nontender, Nondistended; Bowel sounds present  Neuro: AAO x 3, no focal deficit, 5/5 b/l extremities  EXTREMITIES:  2+ Peripheral Pulses, No clubbing, cyanosis, or edema  SKIN: No rashes or lesions    LABS:                        8.8    10.67 )-----------( 266      ( 11 Nov 2017 08:26 )             28.8     11-11    138  |  101  |  18  ----------------------------<  93  4.0   |  24  |  0.99    Ca    7.9<L>      11 Nov 2017 08:37        CAPILLARY BLOOD GLUCOSE                RADIOLOGY & ADDITIONAL TESTS:    Imaging Personally Reviewed:  [x] YES  [ ] NO    Consultant(s) Notes Reviewed:  [x] YES  [ ] NO      MEDICATIONS  (STANDING):  ascorbic acid 500 milliGRAM(s) Oral daily  atorvastatin 40 milliGRAM(s) Oral at bedtime  carvedilol 6.25 milliGRAM(s) Oral every 12 hours  ferrous    sulfate 325 milliGRAM(s) Oral two times a day with meals  furosemide    Tablet 20 milliGRAM(s) Oral every other day  multivitamin 1 Tablet(s) Oral daily    MEDICATIONS  (PRN):      Care Discussed with Consultants/Other Providers [x] YES  [ ] NO    HEALTH ISSUES - PROBLEM Dx:  Nutrition, metabolism, and development symptoms: Nutrition, metabolism, and development symptoms  Prophylactic measure: Prophylactic measure  Essential hypertension: Essential hypertension  HLD (hyperlipidemia): HLD (hyperlipidemia)  CAD (coronary artery disease): CAD (coronary artery disease)  Anemia: Anemia  Epigastric abdominal pain: Epigastric abdominal pain  Melena: Melena

## 2017-11-12 NOTE — PROGRESS NOTE ADULT - PROBLEM SELECTOR PLAN 1
-- s/p EGD with no clear etiology.  -- unable to swallow the capsule.   -- c/w pantoprazole 40mg IV BID  -- trend Hgb qdaily  -- transfuse if Hgb < 7  -- active type and screen, two large bore IVs  -- iron studies  -- GI eval appreciated.   -- Tentative plan for colonoscopy on Monday.  plus EGD with capsule placement.

## 2017-11-13 ENCOUNTER — RESULT REVIEW (OUTPATIENT)
Age: 82
End: 2017-11-13

## 2017-11-13 LAB
FERRITIN SERPL-MCNC: 54 NG/ML — SIGNIFICANT CHANGE UP (ref 30–400)
IRON SATN MFR SERPL: 12 UG/DL — LOW (ref 45–165)
IRON SATN MFR SERPL: 6 % — LOW (ref 16–55)
TIBC SERPL-MCNC: 214 UG/DL — LOW (ref 220–430)
UIBC SERPL-MCNC: 202 UG/DL — SIGNIFICANT CHANGE UP (ref 110–370)
VIT B12 SERPL-MCNC: 850 PG/ML — SIGNIFICANT CHANGE UP (ref 243–894)

## 2017-11-13 RX ADMIN — ATORVASTATIN CALCIUM 40 MILLIGRAM(S): 80 TABLET, FILM COATED ORAL at 21:45

## 2017-11-13 RX ADMIN — CARVEDILOL PHOSPHATE 6.25 MILLIGRAM(S): 80 CAPSULE, EXTENDED RELEASE ORAL at 05:00

## 2017-11-13 RX ADMIN — Medication 325 MILLIGRAM(S): at 08:04

## 2017-11-13 RX ADMIN — Medication 500 MILLIGRAM(S): at 11:24

## 2017-11-13 RX ADMIN — Medication 1 TABLET(S): at 11:24

## 2017-11-13 RX ADMIN — CARVEDILOL PHOSPHATE 6.25 MILLIGRAM(S): 80 CAPSULE, EXTENDED RELEASE ORAL at 18:36

## 2017-11-13 NOTE — PROGRESS NOTE ADULT - PROBLEM SELECTOR PLAN 1
-- s/p EGD with no clear etiology.  -- unable to swallow the capsule.   -- c/w pantoprazole 40mg IV BID  -- trend Hgb qdaily  -- transfuse if Hgb < 7  -- active type and screen, two large bore IVs  -- iron studies  -- GI eval appreciated.   Colonoscopy today  plus EGD with capsule placement.  Hgb stable so far.

## 2017-11-13 NOTE — PROGRESS NOTE ADULT - SUBJECTIVE AND OBJECTIVE BOX
Pre-Endoscopy Evaluation      Referring Physician:  Dr. Solano                                Procedure: Egd/Colonscopy    Indication for Procedure: GIB    Pertinent History: 89 year old male with history of CAD s/p CABG stents (last 15 years ago) on ASA/plavix, HTN, HLD   CHF presents with epigastric pain and melena. s/p EGD with no bleeding source identified, patient was unable to tolerate Capsule    Sedation by Anesthesia [x]    PAST MEDICAL & SURGICAL HISTORY:  HLD (hyperlipidemia)  Hypertension  CAD (coronary artery disease)  CABG  Coronary stents  No significant past surgical history      PMH of Gastroparesis [ ]  Gastric Surgery [ ]  Gastric Outlet Obstruction [ ]    Allergies:    No Known Allergies    Intolerances:    Latex allergy: [ ] yes [x] no    Medications:MEDICATIONS  (STANDING):  ascorbic acid 500 milliGRAM(s) Oral daily  atorvastatin 40 milliGRAM(s) Oral at bedtime  carvedilol 6.25 milliGRAM(s) Oral every 12 hours  ferrous    sulfate 325 milliGRAM(s) Oral two times a day with meals  furosemide    Tablet 20 milliGRAM(s) Oral every other day  multivitamin 1 Tablet(s) Oral daily    MEDICATIONS  (PRN):      Smoking: [ ] yes  [x] no    AICD/PPM: [ ] yes   [x] no    Pertinent lab data:                        9.4    8.2   )-----------( 247      ( 13 Nov 2017 10:37 )             28.8     11-13    144  |  107  |  13  ----------------------------<  93  4.6   |  27  |  0.99    Ca    8.8      13 Nov 2017 10:38        Physical Examination:    Daily   Vital Signs Last 24 Hrs  T(C): 36.3 (13 Nov 2017 13:04), Max: 36.4 (12 Nov 2017 16:48)  T(F): 97.4 (13 Nov 2017 13:04), Max: 97.5 (12 Nov 2017 16:48)  HR: 69 (13 Nov 2017 13:04) (69 - 79)  BP: 161/72 (13 Nov 2017 13:04) (127/70 - 161/72)  BP(mean): --  RR: 18 (13 Nov 2017 13:04) (18 - 18)  SpO2: 99% (13 Nov 2017 13:04) (99% - 100%)      Constitutional: NAD    Neck:  No JVD    Respiratory: CTAB/L    Cardiovascular: S1 and S2    Gastrointestinal: BS+, soft, NT/ND    Extremities: No peripheral edema    Neurological: A/O x 3    : No Devine    Skin: No rashes    Comments:    ASA Class: I [ ]  II [ ]  III [x]  IV [ ]    The patient is a suitable candidate for the planned procedure unless box checked [ ]  No, explain:

## 2017-11-13 NOTE — PROGRESS NOTE ADULT - SUBJECTIVE AND OBJECTIVE BOX
Patient is a 89y old  Male who presents with a chief complaint of melena, abdominal pain (09 Nov 2017 23:38)      SUBJECTIVE / OVERNIGHT EVENTS:  Pt seen and examined at bedside.   No overnight event.  Feel tired after bowel prep.  no cp, no sob, no n/v/d.   Still have melena.   The son at bedside.       Vital Signs Last 24 Hrs  T(C): 36.3 (13 Nov 2017 13:04), Max: 36.4 (12 Nov 2017 16:48)  T(F): 97.4 (13 Nov 2017 13:04), Max: 97.5 (12 Nov 2017 16:48)  HR: 69 (13 Nov 2017 13:04) (69 - 79)  BP: 161/72 (13 Nov 2017 13:04) (127/70 - 161/72)  BP(mean): --  RR: 18 (13 Nov 2017 13:04) (18 - 18)  SpO2: 99% (13 Nov 2017 13:04) (99% - 100%)  I&O's Summary    12 Nov 2017 07:01  -  13 Nov 2017 07:00  --------------------------------------------------------  IN: 3200 mL / OUT: 300 mL / NET: 2900 mL        PHYSICAL EXAM:  GENERAL: NAD, Comfortable  HEAD:  Atraumatic, Normocephalic  EYES: EOMI, PERRLA, conjunctiva and sclera clear  NECK: Supple, No JVD  CHEST/LUNG: Clear to auscultation bilaterally; No wheeze  HEART: Regular rate and rhythm; No murmurs, rubs, or gallops  ABDOMEN: Soft, Nontender, Nondistended; Bowel sounds present  Neuro: AAO x 2-3, no focal deficit, 5/5 b/l extremities  EXTREMITIES:  2+ Peripheral Pulses, No clubbing, cyanosis, or edema  SKIN: No rashes or lesions    LABS:                        9.4    8.2   )-----------( 247      ( 13 Nov 2017 10:37 )             28.8     11-13    144  |  107  |  13  ----------------------------<  93  4.6   |  27  |  0.99    Ca    8.8      13 Nov 2017 10:38        CAPILLARY BLOOD GLUCOSE                RADIOLOGY & ADDITIONAL TESTS:    Imaging Personally Reviewed:  [x] YES  [ ] NO    Consultant(s) Notes Reviewed:  [x] YES  [ ] NO      MEDICATIONS  (STANDING):  ascorbic acid 500 milliGRAM(s) Oral daily  atorvastatin 40 milliGRAM(s) Oral at bedtime  carvedilol 6.25 milliGRAM(s) Oral every 12 hours  ferrous    sulfate 325 milliGRAM(s) Oral two times a day with meals  furosemide    Tablet 20 milliGRAM(s) Oral every other day  multivitamin 1 Tablet(s) Oral daily    MEDICATIONS  (PRN):      Care Discussed with Consultants/Other Providers [x] YES  [ ] NO    HEALTH ISSUES - PROBLEM Dx:  Nutrition, metabolism, and development symptoms: Nutrition, metabolism, and development symptoms  Prophylactic measure: Prophylactic measure  Essential hypertension: Essential hypertension  HLD (hyperlipidemia): HLD (hyperlipidemia)  CAD (coronary artery disease): CAD (coronary artery disease)  Anemia: Anemia  Epigastric abdominal pain: Epigastric abdominal pain  Melena: Melena

## 2017-11-14 LAB
HCT VFR BLD CALC: 29.9 % — LOW (ref 39–50)
HGB BLD-MCNC: 9.2 G/DL — LOW (ref 13–17)
MCHC RBC-ENTMCNC: 26.8 PG — LOW (ref 27–34)
MCHC RBC-ENTMCNC: 30.8 GM/DL — LOW (ref 32–36)
MCV RBC AUTO: 87.2 FL — SIGNIFICANT CHANGE UP (ref 80–100)
PLATELET # BLD AUTO: 269 K/UL — SIGNIFICANT CHANGE UP (ref 150–400)
RBC # BLD: 3.43 M/UL — LOW (ref 4.2–5.8)
RBC # FLD: 17.4 % — HIGH (ref 10.3–14.5)
SURGICAL PATHOLOGY STUDY: SIGNIFICANT CHANGE UP
WBC # BLD: 11.57 K/UL — HIGH (ref 3.8–10.5)
WBC # FLD AUTO: 11.57 K/UL — HIGH (ref 3.8–10.5)

## 2017-11-14 PROCEDURE — 74177 CT ABD & PELVIS W/CONTRAST: CPT | Mod: 26

## 2017-11-14 PROCEDURE — 71260 CT THORAX DX C+: CPT | Mod: 26

## 2017-11-14 RX ORDER — IRON SUCROSE 20 MG/ML
100 INJECTION, SOLUTION INTRAVENOUS ONCE
Qty: 0 | Refills: 0 | Status: COMPLETED | OUTPATIENT
Start: 2017-11-14 | End: 2017-11-14

## 2017-11-14 RX ADMIN — ATORVASTATIN CALCIUM 40 MILLIGRAM(S): 80 TABLET, FILM COATED ORAL at 21:19

## 2017-11-14 RX ADMIN — IRON SUCROSE 210 MILLIGRAM(S): 20 INJECTION, SOLUTION INTRAVENOUS at 20:08

## 2017-11-14 RX ADMIN — CARVEDILOL PHOSPHATE 6.25 MILLIGRAM(S): 80 CAPSULE, EXTENDED RELEASE ORAL at 17:35

## 2017-11-14 RX ADMIN — CARVEDILOL PHOSPHATE 6.25 MILLIGRAM(S): 80 CAPSULE, EXTENDED RELEASE ORAL at 05:39

## 2017-11-14 RX ADMIN — Medication 325 MILLIGRAM(S): at 12:28

## 2017-11-14 RX ADMIN — Medication 500 MILLIGRAM(S): at 12:28

## 2017-11-14 RX ADMIN — Medication 325 MILLIGRAM(S): at 17:35

## 2017-11-14 RX ADMIN — Medication 1 TABLET(S): at 12:28

## 2017-11-14 RX ADMIN — Medication 20 MILLIGRAM(S): at 12:28

## 2017-11-14 NOTE — PROGRESS NOTE ADULT - PROBLEM SELECTOR PLAN 1
-- s/p EGD with no clear etiology.  -- c/w pantoprazole 40mg IV BID  -- hgb stable.   -- transfuse if Hgb < 7  --  very iron deficient. Iron supplemented   -- GI eval appreciated.   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   -- Had extensive discussion with the son (conservative approach vs. aggressive approach discussed. The son and the daughter in law at bedside.  Risk and benefits explained. Not ready to make a decision yet.  CT abdomen/chest to see if there is metastasis.  Will re-visit the discussion tomorrow after CT results.   Oncology Dr. Kothari consulted.  Discussed with the pt and family. All questions answered.

## 2017-11-14 NOTE — PROGRESS NOTE ADULT - SUBJECTIVE AND OBJECTIVE BOX
INTERVAL HPI/OVERNIGHT EVENTS:  feels well. no pain. melenic stool this am after colonoscop yesterday with biopsies. tolerating diet    MEDICATIONS  (STANDING):  ascorbic acid 500 milliGRAM(s) Oral daily  atorvastatin 40 milliGRAM(s) Oral at bedtime  carvedilol 6.25 milliGRAM(s) Oral every 12 hours  ferrous    sulfate 325 milliGRAM(s) Oral two times a day with meals  furosemide    Tablet 20 milliGRAM(s) Oral every other day  multivitamin 1 Tablet(s) Oral daily    MEDICATIONS  (PRN):      Allergies    No Known Allergies    Intolerances      Vital Signs Last 24 Hrs  T(C): 36.4 (14 Nov 2017 05:09), Max: 36.7 (13 Nov 2017 20:44)  T(F): 97.6 (14 Nov 2017 05:09), Max: 98.1 (13 Nov 2017 20:44)  HR: 82 (14 Nov 2017 05:09) (50 - 82)  BP: 160/66 (14 Nov 2017 05:09) (105/54 - 198/67)  BP(mean): --  RR: 17 (14 Nov 2017 05:09) (17 - 18)  SpO2: 97% (14 Nov 2017 05:09) (95% - 99%)      PHYSICAL EXAM:  General: comfortable in No acute distress  CV: regular rate and rhythm. no murmurs rubs or gallops  Lungs: clear to ascultation bilaterally  abdomen: soft nontender nondistened normal bowel sounds  ext: negative edema  skin: no rashes noted        LABS:                        9.2    11.57 )-----------( 269      ( 14 Nov 2017 07:37 )             29.9     11-13    144  |  107  |  13  ----------------------------<  93  4.6   |  27  |  0.99    Ca    8.8      13 Nov 2017 10:38          LIVER FUNCTIONS - ( 09 Nov 2017 20:27 )  Alb: 3.5 g/dL / Pro: 6.1 g/dL / ALK PHOS: 82 U/L / ALT: 13 U/L RC / AST: 23 U/L / GGT: x             RADIOLOGY & ADDITIONAL TESTS:

## 2017-11-14 NOTE — PROGRESS NOTE ADULT - ASSESSMENT
89 year old man with anemia and gi bleed 2nd to ascending colon growth. high suspicion for malignancy  s/p biopsies  anticipate pathology to reveal malignancy. I favor surgery as optimal management for lesion even with patients age and h/o CABG 15 years ago. Given his age risk of surgery as higher as well.    Family to discuss plan and options for Tashi.   will follow along  if family does nto want any furhter intervention. HGb currently stable and no furhter inpatient workup warranted.   attempt made to call keeley will call again  call with questions [phone]

## 2017-11-14 NOTE — PROGRESS NOTE ADULT - SUBJECTIVE AND OBJECTIVE BOX
Patient is a 89y old  Male who presents with a chief complaint of melena, abdominal pain (09 Nov 2017 23:38)      SUBJECTIVE / OVERNIGHT EVENTS:  Pt seen and examined at bedside. Feels well.   No chest pain, no shortness of breath.   No overnight event. + melena. + BM. no diarrhea.   No N/V/D. No abdominal pain.       Vital Signs Last 24 Hrs  T(C): 36.4 (14 Nov 2017 11:21), Max: 36.7 (13 Nov 2017 20:44)  T(F): 97.6 (14 Nov 2017 11:21), Max: 98.1 (13 Nov 2017 20:44)  HR: 82 (14 Nov 2017 11:21) (50 - 82)  BP: 160/80 (14 Nov 2017 11:21) (105/54 - 198/67)  BP(mean): --  RR: 18 (14 Nov 2017 11:21) (17 - 18)  SpO2: 97% (14 Nov 2017 11:21) (95% - 98%)  I&O's Summary    13 Nov 2017 07:01  -  14 Nov 2017 07:00  --------------------------------------------------------  IN: 240 mL / OUT: 100 mL / NET: 140 mL    14 Nov 2017 07:01  -  14 Nov 2017 16:11  --------------------------------------------------------  IN: 400 mL / OUT: 0 mL / NET: 400 mL        PHYSICAL EXAM:  GENERAL: NAD, Comfortable  HEAD:  Atraumatic, Normocephalic  EYES: EOMI, PERRLA, conjunctiva and sclera clear  NECK: Supple, No JVD  CHEST/LUNG: Clear to auscultation bilaterally; No wheeze  HEART: Regular rate and rhythm; No murmurs, rubs, or gallops  ABDOMEN: Soft, Nontender, Nondistended; Bowel sounds present  Neuro: AAO x 2-3, no focal deficit, 5/5 b/l extremities  EXTREMITIES:  2+ Peripheral Pulses, No clubbing, cyanosis, or edema  SKIN: No rashes or lesions    LABS:                        9.2    11.57 )-----------( 269      ( 14 Nov 2017 07:37 )             29.9     11-13    144  |  107  |  13  ----------------------------<  93  4.6   |  27  |  0.99    Ca    8.8      13 Nov 2017 10:38        CAPILLARY BLOOD GLUCOSE                RADIOLOGY & ADDITIONAL TESTS:    Imaging Personally Reviewed:  [x] YES  [ ] NO    Consultant(s) Notes Reviewed:  [x] YES  [ ] NO      MEDICATIONS  (STANDING):  ascorbic acid 500 milliGRAM(s) Oral daily  atorvastatin 40 milliGRAM(s) Oral at bedtime  carvedilol 6.25 milliGRAM(s) Oral every 12 hours  ferrous    sulfate 325 milliGRAM(s) Oral two times a day with meals  furosemide    Tablet 20 milliGRAM(s) Oral every other day  iron sucrose IVPB 100 milliGRAM(s) IV Intermittent once  multivitamin 1 Tablet(s) Oral daily    MEDICATIONS  (PRN):      Care Discussed with Consultants/Other Providers [x] YES  [ ] NO    HEALTH ISSUES - PROBLEM Dx:  Nutrition, metabolism, and development symptoms: Nutrition, metabolism, and development symptoms  Prophylactic measure: Prophylactic measure  Essential hypertension: Essential hypertension  HLD (hyperlipidemia): HLD (hyperlipidemia)  CAD (coronary artery disease): CAD (coronary artery disease)  Anemia: Anemia  Epigastric abdominal pain: Epigastric abdominal pain  Melena: Melena

## 2017-11-14 NOTE — PROGRESS NOTE ADULT - ATTENDING COMMENTS
Dr. Kwon will be covering me starting 11/15/17. Please paged her at  if needed.     - Dr. JACQUI Solano ([facility])  - [phone]

## 2017-11-15 DIAGNOSIS — C18.9 MALIGNANT NEOPLASM OF COLON, UNSPECIFIED: ICD-10-CM

## 2017-11-15 PROCEDURE — 99223 1ST HOSP IP/OBS HIGH 75: CPT | Mod: 24

## 2017-11-15 RX ADMIN — Medication 1 TABLET(S): at 11:17

## 2017-11-15 RX ADMIN — Medication 500 MILLIGRAM(S): at 11:17

## 2017-11-15 RX ADMIN — ATORVASTATIN CALCIUM 40 MILLIGRAM(S): 80 TABLET, FILM COATED ORAL at 21:16

## 2017-11-15 RX ADMIN — CARVEDILOL PHOSPHATE 6.25 MILLIGRAM(S): 80 CAPSULE, EXTENDED RELEASE ORAL at 05:04

## 2017-11-15 RX ADMIN — Medication 325 MILLIGRAM(S): at 10:09

## 2017-11-15 RX ADMIN — CARVEDILOL PHOSPHATE 6.25 MILLIGRAM(S): 80 CAPSULE, EXTENDED RELEASE ORAL at 17:12

## 2017-11-15 RX ADMIN — Medication 325 MILLIGRAM(S): at 17:12

## 2017-11-15 NOTE — CONSULT NOTE ADULT - SUBJECTIVE AND OBJECTIVE BOX
89M PMHx CAD s/p stents, HTN, HLD, and CHF who presented to [facility] on 11/9/17 complaining of epigastric pain & melena of 2 days duration. He reported his pain as intermittent, 8/10, without radiation, not associated w/ food. He also reports 2 episodes of dark stools the day of presentation Of note, he reports he was recently diagnosed with iron deficiency in [place].  Denies no weight loss, decreased appetite, CP, SOB, light headedness, N/V, fevers chills. He was admitted to Medicine for further evaluation. GI was consulted and performed upper GI endoscopy (11/10) & colonoscopy (11/11) that revealed a mass in the ascending colon, w/ biopsy showing invasive adenocarcinoma. CT showed a large R colon mass, but did not reveal any evidence of metastatic disease.    Surgical Oncology was consulted to evaluate for resection.       PMHx: HLD (hyperlipidemia)  Hypertension  CAD (coronary artery disease)    PSHx: No significant past surgical history    Home Medications:  aspirin 325 mg oral tablet: 1 tab(s) orally once a day (10 Nov 2017 10:43)  atorvastatin 40 mg oral tablet: 1 tab(s) orally once a day (10 Nov 2017 10:43)  carvedilol 6.25 mg oral tablet: 1 tab(s) orally 2 times a day (10 Nov 2017 10:43)  clopidogrel 75 mg oral tablet: 1 tab(s) orally once a day (10 Nov 2017 10:43)  docusate sodium 100 mg oral capsule: 1 cap(s) orally once a day, As Needed (10 Nov 2017 10:43)  ferrous sulfate 325 mg (65 mg elemental iron) oral tablet: 1 tab(s) orally 2 times a day (10 Nov 2017 10:43)  furosemide 20 mg oral tablet: 1 tab(s) orally every other day (10 Nov 2017 10:43)  isosorbide mononitrate 30 mg oral tablet, extended release: 1 tab(s) orally once a day (10 Nov 2017 10:43)  Multiple Vitamins oral tablet: 1 tab(s) orally once a day (10 Nov 2017 10:43)  Vitamin C 500 mg oral tablet: 1 tab(s) orally once a day (10 Nov 2017 10:43)      Medications (inpatient): ascorbic acid 500 milliGRAM(s) Oral daily  atorvastatin 40 milliGRAM(s) Oral at bedtime  carvedilol 6.25 milliGRAM(s) Oral every 12 hours  ferrous    sulfate 325 milliGRAM(s) Oral two times a day with meals  furosemide    Tablet 20 milliGRAM(s) Oral every other day  multivitamin 1 Tablet(s) Oral daily    Medications (PRN):  Allergies: No Known Allergies    Social Hx: lives between [place] & [place]. Former tobacco smoker. EtOH: 1 drink daily.    Physical Exam  T(C): 36.7 (11-15-17 @ 12:15)  HR: 83 (11-15-17 @ 17:11) (58 - 83)  BP: 114/60 (11-15-17 @ 17:11) (114/60 - 144/77)  RR: 18 (11-15-17 @ 12:15) (18 - 18)  SpO2: 95% (11-15-17 @ 12:15) (94% - 95%)  Wt(kg): --  Tmax: T(C): , Max: 36.7 (11-15-17 @ 12:15)  Wt(kg): --    11-14-17  -  11-15-17  --------------------------------------------------------  IN:    IV PiggyBack: 100 mL    Oral Fluid: 880 mL  Total IN: 980 mL    OUT:  Total OUT: 0 mL    Total NET: 980 mL      11-15-17  -  11-15-17  --------------------------------------------------------  IN:    Oral Fluid: 480 mL  Total IN: 480 mL    OUT:  Total OUT: 0 mL    Total NET: 480 mL        General: well developed, well nourished, NAD  Neuro: alert and oriented, no focal deficits, moves all extremities spontaneously  HEENT: NCAT, EOMI, anicteric, mucosa moist  Respiratory: airway patent, respirations unlabored  CVS: regular rate and rhythm  Abdomen: soft, nontender, nondistended  Extremities: no edema, sensation and movement grossly intact  Skin: warm, dry, appropriate color    Labs:                        9.2    11.57 )-----------( 269      ( 14 Nov 2017 07:37 )             29.9           Imaging and other studies:  < from: CT Abdomen and Pelvis w/ Oral Cont and w/ IV Cont (11.14.17 @ 19:20) >  IMPRESSION: Large right colon mass. No evidence of metastatic disease.   Moderate pleural effusions.    < end of copied text >    < from: Upper Endoscopy (11.10.17 @ 11:10) >  Findings:     The esophagus was normal.       The stomach was normal.       The examined duodenum was normal.    < end of copied text >      < from: Colonoscopy (11.13.17 @ 15:44) >  Impression:          - Likely malignant partially obstructingtumor in the ascending colon.                        Biopsied. Tattooed.                       - Diverticulosis in the sigmoid colon, in the descending colon, in the                        transverse colon and in the ascending colon.       - The examined portion of the ileum was normal.                       - Internal hemorrhoids.    < end of copied text >      Surgical Pathology Report (11.13.17 @ 17:00)    Surgical Pathology Report:   ACCESSION No:  [id number]    MICHELLE HERRERA                     1        Surgical Final Report          Final Diagnosis  1     Ascending colon mass biopsy:  - Invasive adenocarcinoma.    Note: Dr. Hayes has reviewed this case and concurred the diagnosis.    Verified by: Kristen Dasilva M.D.  (Electronic Signature)  Reported on: 11/14/17 17:57 [facility],[address] [place] [address]  [id number]  _________________________________________________________________    Clinical History  Melena ascending colon mass    Specimen(s) Submitted  1     Ascending colon mass bx    Gross Description  The specimen is received in formalin and the specimen container  is labeled: Ascending colon mass biopsy.  It  consists of six  fragments of soft, tan-pink tissue ranging from 0.2 to 0.8 cm in  maximum dimension.  Entirely submitted.  One cassette.    In addition to other data that may appear on the specimen  container, the label has been inspected to confirm the presence  of the patient's name and date of birth.  [facility] 11/14/17 01:20 89M PMHx CAD s/p stents, HTN, HLD, and CHF who presented to [facility] ED on 11/9/17 complaining of epigastric pain & melena going on a long time. He reported his pain as intermittent, 8/10, without radiation, not associated w/ food. He also reports 2 episodes of dark stools the day of presentation Of note, he reports he was recently diagnosed with iron deficiency in [place].  Denies no weight loss, decreased appetite, CP, SOB, light headedness, N/V, fevers chills. He was admitted to Medicine for further evaluation. GI was consulted and performed upper GI endoscopy (11/10) & colonoscopy (11/11) that revealed a mass in the ascending colon, w/ biopsy showing invasive adenocarcinoma. CT showed a large R colon mass, but did not reveal any evidence of metastatic disease.    Surgical Oncology was consulted to evaluate for resection.       PMHx: HLD (hyperlipidemia)  Hypertension  CAD (coronary artery disease)    PSHx: No significant past surgical history    Home Medications:  aspirin 325 mg oral tablet: 1 tab(s) orally once a day (10 Nov 2017 10:43)  atorvastatin 40 mg oral tablet: 1 tab(s) orally once a day (10 Nov 2017 10:43)  carvedilol 6.25 mg oral tablet: 1 tab(s) orally 2 times a day (10 Nov 2017 10:43)  clopidogrel 75 mg oral tablet: 1 tab(s) orally once a day (10 Nov 2017 10:43)  docusate sodium 100 mg oral capsule: 1 cap(s) orally once a day, As Needed (10 Nov 2017 10:43)  ferrous sulfate 325 mg (65 mg elemental iron) oral tablet: 1 tab(s) orally 2 times a day (10 Nov 2017 10:43)  furosemide 20 mg oral tablet: 1 tab(s) orally every other day (10 Nov 2017 10:43)  isosorbide mononitrate 30 mg oral tablet, extended release: 1 tab(s) orally once a day (10 Nov 2017 10:43)  Multiple Vitamins oral tablet: 1 tab(s) orally once a day (10 Nov 2017 10:43)  Vitamin C 500 mg oral tablet: 1 tab(s) orally once a day (10 Nov 2017 10:43)      Medications (inpatient): ascorbic acid 500 milliGRAM(s) Oral daily  atorvastatin 40 milliGRAM(s) Oral at bedtime  carvedilol 6.25 milliGRAM(s) Oral every 12 hours  ferrous    sulfate 325 milliGRAM(s) Oral two times a day with meals  furosemide    Tablet 20 milliGRAM(s) Oral every other day  multivitamin 1 Tablet(s) Oral daily    Medications (PRN):  Allergies: No Known Allergies    Social Hx: lives between [place] & [place]. Former tobacco smoker. EtOH: 1 drink daily.    Physical Exam  T(C): 36.7 (11-15-17 @ 12:15)  HR: 83 (11-15-17 @ 17:11) (58 - 83)  BP: 114/60 (11-15-17 @ 17:11) (114/60 - 144/77)  RR: 18 (11-15-17 @ 12:15) (18 - 18)  SpO2: 95% (11-15-17 @ 12:15) (94% - 95%)  Wt(kg): --  Tmax: T(C): , Max: 36.7 (11-15-17 @ 12:15)  Wt(kg): --    11-14-17  -  11-15-17  --------------------------------------------------------  IN:    IV PiggyBack: 100 mL    Oral Fluid: 880 mL  Total IN: 980 mL    OUT:  Total OUT: 0 mL    Total NET: 980 mL      11-15-17  -  11-15-17  --------------------------------------------------------  IN:    Oral Fluid: 480 mL  Total IN: 480 mL    OUT:  Total OUT: 0 mL    Total NET: 480 mL        General: well developed, well nourished, NAD  Neuro: alert and oriented, no focal deficits, moves all extremities spontaneously  HEENT: NCAT, EOMI, anicteric, mucosa moist  Respiratory: airway patent, respirations unlabored  CVS: regular rate and rhythm  Abdomen: soft, nontender, nondistended  Extremities: no edema, sensation and movement grossly intact  Skin: warm, dry, appropriate color    Labs:                        9.2    11.57 )-----------( 269      ( 14 Nov 2017 07:37 )             29.9           Imaging and other studies:  < from: CT Abdomen and Pelvis w/ Oral Cont and w/ IV Cont (11.14.17 @ 19:20) >  IMPRESSION: Large right colon mass. No evidence of metastatic disease.   Moderate pleural effusions.    < end of copied text >    < from: Upper Endoscopy (11.10.17 @ 11:10) >  Findings:     The esophagus was normal.       The stomach was normal.       The examined duodenum was normal.    < end of copied text >      < from: Colonoscopy (11.13.17 @ 15:44) >  Impression:          - Likely malignant partially obstructingtumor in the ascending colon.                        Biopsied. Tattooed.                       - Diverticulosis in the sigmoid colon, in the descending colon, in the                        transverse colon and in the ascending colon.       - The examined portion of the ileum was normal.                       - Internal hemorrhoids.    < end of copied text >      Surgical Pathology Report (11.13.17 @ 17:00)    Surgical Pathology Report:   ACCESSION No:  [id number]    MICHELLE HERRERA                     1        Surgical Final Report          Final Diagnosis  1     Ascending colon mass biopsy:  - Invasive adenocarcinoma.    Note: Dr. Hayes has reviewed this case and concurred the diagnosis.    Verified by: Kristen Dasilva M.D.  (Electronic Signature)  Reported on: 11/14/17 17:57 [facility],[address] [place] [address]  [id number]  _________________________________________________________________    Clinical History  Melena ascending colon mass    Specimen(s) Submitted  1     Ascending colon mass bx    Gross Description  The specimen is received in formalin and the specimen container  is labeled: Ascending colon mass biopsy.  It  consists of six  fragments of soft, tan-pink tissue ranging from 0.2 to 0.8 cm in  maximum dimension.  Entirely submitted.  One cassette.    In addition to other data that may appear on the specimen  container, the label has been inspected to confirm the presence  of the patient's name and date of birth.  [facility] 11/14/17 01:20 Surgical Oncology Consult  Blue team [id number]    Attending: Dr. James Flores    HPI:  89M PMHx CAD s/p stents, HTN, HLD, and CHF who presented to [facility] ED on 11/9/17 complaining of epigastric pain & melena going on a long time. He reported his pain as intermittent, 8/10, without radiation, not associated w/ food. He also reports 2 episodes of dark stools the day of presentation Of note, he reports he was recently diagnosed with iron deficiency in [place].  Denies no weight loss, decreased appetite, CP, SOB, light headedness, N/V, fevers chills. He was admitted to Medicine for further evaluation. GI was consulted and performed upper GI endoscopy (11/10) & colonoscopy (11/11) that revealed a mass in the ascending colon, w/ biopsy showing invasive adenocarcinoma. CT showed a large R colon mass, but did not reveal any evidence of metastatic disease. Admission Hgb/Hct 9.4/29; has not received any transfusion since admission.     Surgical Oncology was consulted to evaluate for resection. Per patient and his son, he only wants surgery if he doesn't get "the bag". Per patient his father had surgery for cancer and required an ostomy that was so foul smelling he used to have to smoke cigars to mask smell. He is interested in surgery only if no ostomy involved; would prefer to have no resection & be closed up if on opening there is not possibility of resection w/out ostomy. Not interested in Chemotherapy after surgery. Denies any previous abdominal surgeries.       PMHx: HLD (hyperlipidemia)  Hypertension  CAD (coronary artery disease)    PSHx: No significant past surgical history    Home Medications:  aspirin 325 mg oral tablet: 1 tab(s) orally once a day (10 Nov 2017 10:43)  atorvastatin 40 mg oral tablet: 1 tab(s) orally once a day (10 Nov 2017 10:43)  carvedilol 6.25 mg oral tablet: 1 tab(s) orally 2 times a day (10 Nov 2017 10:43)  clopidogrel 75 mg oral tablet: 1 tab(s) orally once a day (10 Nov 2017 10:43)  docusate sodium 100 mg oral capsule: 1 cap(s) orally once a day, As Needed (10 Nov 2017 10:43)  ferrous sulfate 325 mg (65 mg elemental iron) oral tablet: 1 tab(s) orally 2 times a day (10 Nov 2017 10:43)  furosemide 20 mg oral tablet: 1 tab(s) orally every other day (10 Nov 2017 10:43)  isosorbide mononitrate 30 mg oral tablet, extended release: 1 tab(s) orally once a day (10 Nov 2017 10:43)  Multiple Vitamins oral tablet: 1 tab(s) orally once a day (10 Nov 2017 10:43)  Vitamin C 500 mg oral tablet: 1 tab(s) orally once a day (10 Nov 2017 10:43)      Medications (inpatient): ascorbic acid 500 milliGRAM(s) Oral daily  atorvastatin 40 milliGRAM(s) Oral at bedtime  carvedilol 6.25 milliGRAM(s) Oral every 12 hours  ferrous    sulfate 325 milliGRAM(s) Oral two times a day with meals  furosemide    Tablet 20 milliGRAM(s) Oral every other day  multivitamin 1 Tablet(s) Oral daily    Medications (PRN):  Allergies: No Known Allergies    Social Hx: lives between [facility] & [place]; lives alone. Former tobacco smoker, last 1977. EtOH: 1 glass wine daily.    Physical Exam  T(C): 36.7 (11-15-17 @ 12:15)  HR: 83 (11-15-17 @ 17:11) (58 - 83)  BP: 114/60 (11-15-17 @ 17:11) (114/60 - 144/77)  RR: 18 (11-15-17 @ 12:15) (18 - 18)  SpO2: 95% (11-15-17 @ 12:15) (94% - 95%)  Wt(kg): --  Tmax: T(C): , Max: 36.7 (11-15-17 @ 12:15)  Wt(kg): --    11-14-17  -  11-15-17  --------------------------------------------------------  IN:    IV PiggyBack: 100 mL    Oral Fluid: 880 mL  Total IN: 980 mL    OUT:  Total OUT: 0 mL    Total NET: 980 mL      11-15-17  -  11-15-17  --------------------------------------------------------  IN:    Oral Fluid: 480 mL  Total IN: 480 mL    OUT:  Total OUT: 0 mL    Total NET: 480 mL        General: well developed, well nourished, NAD  Neuro: alert and oriented, no focal deficits, moves all extremities spontaneously  HEENT: NCAT, EOMI, anicteric, mucosa moist; b/l hearing aids in place  Respiratory: airway patent, respirations unlabored  Abdomen: soft, nontender, nondistended; no evidence of scars  Extremities: no edema, sensation and movement grossly intact  Skin: warm, dry, appropriate color      Labs:                        9.2    11.57 )-----------( 269      ( 14 Nov 2017 07:37 )             29.9           Imaging and other studies:  < from: CT Abdomen and Pelvis w/ Oral Cont and w/ IV Cont (11.14.17 @ 19:20) >  IMPRESSION: Large right colon mass. No evidence of metastatic disease.   Moderate pleural effusions.    < end of copied text >    < from: Upper Endoscopy (11.10.17 @ 11:10) >  Findings:     The esophagus was normal.       The stomach was normal.       The examined duodenum was normal.    < end of copied text >      < from: Colonoscopy (11.13.17 @ 15:44) >  Impression:          - Likely malignant partially obstructingtumor in the ascending colon.                        Biopsied. Tattooed.                       - Diverticulosis in the sigmoid colon, in the descending colon, in the                        transverse colon and in the ascending colon.       - The examined portion of the ileum was normal.                       - Internal hemorrhoids.    < end of copied text >      Surgical Pathology Report (11.13.17 @ 17:00)    Surgical Pathology Report:   ACCESSION No:  10 [id number]    MICHELLE HERRERA                     1        Surgical Final Report          Final Diagnosis  1     Ascending colon mass biopsy:  - Invasive adenocarcinoma.    Note: Dr. Hayes has reviewed this case and concurred the diagnosis.    Verified by: Kristen Dasilva M.D.  (Electronic Signature)  Reported on: 11/14/17 17:57 EST,[address] [place] [address], [place]  [id number]  _________________________________________________________________    Clinical History  Melena ascending colon mass    Specimen(s) Submitted  1     Ascending colon mass bx    Gross Description  The specimen is received in formalin and the specimen container  is labeled: Ascending colon mass biopsy.  It  consists of six  fragments of soft, tan-pink tissue ranging from 0.2 to 0.8 cm in  maximum dimension.  Entirely submitted.  One cassette.    In addition to other data that may appear on the specimen  container, the label has been inspected to confirm the presence  of the patient's name and date of birth.  [facility] 11/14/17 01:20

## 2017-11-15 NOTE — CONSULT NOTE ADULT - ATTENDING COMMENTS
D/w pt.  simran 89 yo.  He would like to discuss options with his son prior to deciding re goals of care and if he wishes to pursue any surgical intervention.  Left message for son to discuss.

## 2017-11-15 NOTE — PROGRESS NOTE ADULT - SUBJECTIVE AND OBJECTIVE BOX
Feels well  No further melena      Vital Signs Last 24 Hrs  T(C): 36.3 (15 Nov 2017 04:32), Max: 36.9 (14 Nov 2017 16:18)  T(F): 97.4 (15 Nov 2017 04:32), Max: 98.5 (14 Nov 2017 16:18)  HR: 80 (15 Nov 2017 04:32) (80 - 83)  BP: 144/77 (15 Nov 2017 04:32) (114/75 - 144/77)  BP(mean): --  RR: 18 (15 Nov 2017 04:32) (18 - 18)  SpO2: 94% (15 Nov 2017 04:32) (94% - 98%)    PHYSICAL EXAM:    General: comfortable in No acute distress  CV: regular rate and rhythm.  Lungs: clear to ascultation bilaterally  abdomen: soft nontender nondistened normal bowel sounds  ext: negative edema  skin: no rashes noted    MEDICATIONS  (STANDING):  ascorbic acid 500 milliGRAM(s) Oral daily  atorvastatin 40 milliGRAM(s) Oral at bedtime  carvedilol 6.25 milliGRAM(s) Oral every 12 hours  ferrous    sulfate 325 milliGRAM(s) Oral two times a day with meals  furosemide    Tablet 20 milliGRAM(s) Oral every other day  multivitamin 1 Tablet(s) Oral daily    MEDICATIONS  (PRN):      Allergies    No Known Allergies    Intolerances          LABS:                        9.2    11.57 )-----------( 269      ( 14 Nov 2017 07:37 )             29.9                         9.4    8.2   )-----------( 247      ( 13 Nov 2017 10:37 )             28.8                     RADIOLOGY & ADDITIONAL TESTS:

## 2017-11-15 NOTE — PROGRESS NOTE ADULT - ASSESSMENT
89 year old man with R sided colon cancer w/o evidence of distant spread  Family to discuss plan and options for Tashi.   Would obtain surgical and cardiology consults   Hold antiplatelet agents  Will follow  call with questions [phone]

## 2017-11-15 NOTE — PROGRESS NOTE ADULT - PROBLEM SELECTOR PLAN 1
-- s/p EGD with no clear etiology.  -- hgb stable.  transfuse if Hgb < 7  -- GI eval appreciated.   -- s/p egd and Colonoscopy 11/13 which shows ascending colon mass,   Bx-adenoca ,CT abdomen/chest no e/o metastasis   Oncology Dr. Kothari consult appreciated  Surg-onc Dr Flores consulted  Cardiology c/s-for preop -Dr Rdz consulted

## 2017-11-15 NOTE — CONSULT NOTE ADULT - SUBJECTIVE AND OBJECTIVE BOX
[facility] Hematology/Oncology - Renee Olivia / Rafael / Taye - [phone]  Primary Outpatient Hematologist/Oncologist: None    Chief Complaint:  Abdominal Pain and Melena    HPI:  89 year old man with history of CAD s/p stents, HTN, HLD, and CHF who presents with melena and abdominal pain. Abdominal pain is intermittent, 8/10, without radiation, without association to food. He also reports 2 episodes of dark stools the day of admission.  Recently diagnosed with iron deficiency in [place].  Reports no weight loss. No decreased appetite.     ROS:  General:  (+)Pain, (-)Decrease appeite, (-)Fevers, (-)Chills, (-)Weight Loss  Eyes: (-)Blurry Vision, (-)Double Vision, (-)Vision Loss  ENT: (-)Sinus Congestion, (-)Decreased Hearing, (-)Nosebleeds, (-)Sore Throat  Cardiac: (-)Chest Pain, (-)Palpitations, (-)Shortness of breathe on exertion  Respiratory: (-)Cough, (-)hemoptysis, (-)Shortness of breathe  GI: (-)Nausea, (-)Vomiting, (-)Diarrhea, (-)Constipation, (+)Melena, (-)BRBPR  : (-)Hematuria, (-)Dysuria, (-)[place]ia  MSK: (-)Back pain, (-)Joint pain, (-)Stiffness  Dermatology: (-)Rash, (-)Itching  Neurology:  (-)Numbness, (-)Tingling, (-)Difficulty Walking, (-)Tremors, (-)Weakness  Psych: (-)Anxiety, (-)Depression, (-)Memory Loss  Hematology:  (-)Easy Bruising, (-)Easy Bleeding, (-)Night Sweats.     PAST MEDICAL & SURGICAL HISTORY:  HLD (hyperlipidemia)  Hypertension  CAD (coronary artery disease)  No significant past surgical history    Social History  Tobacco: Denies current use  Alcohol: Denies current use  Drugs:  Denies current use    FAMILY HISTORY:  No pertinent family history in first degree relatives    MEDICATIONS  (STANDING):  ascorbic acid 500 milliGRAM(s) Oral daily  atorvastatin 40 milliGRAM(s) Oral at bedtime  carvedilol 6.25 milliGRAM(s) Oral every 12 hours  ferrous    sulfate 325 milliGRAM(s) Oral two times a day with meals  furosemide    Tablet 20 milliGRAM(s) Oral every other day  multivitamin 1 Tablet(s) Oral daily    Allergies  No Known Allergies    Vital Signs Last 24 Hrs  T(C): 36.3 (15 Nov 2017 04:32), Max: 36.9 (14 Nov 2017 16:18)  T(F): 97.4 (15 Nov 2017 04:32), Max: 98.5 (14 Nov 2017 16:18)  HR: 80 (15 Nov 2017 04:32) (80 - 83)  BP: 144/77 (15 Nov 2017 04:32) (114/75 - 160/80)  BP(mean): --  RR: 18 (15 Nov 2017 04:32) (18 - 18)  SpO2: 94% (15 Nov 2017 04:32) (94% - 98%)    Physical Exam:  General: AAO x 3. NAD. Sitting in chair comfortably. Hard of hearing.   HEENT: clear oropharynx, anicteric sclera, pink conjunctivae, EOMi. PERRLA  Cardiac: S1, S2 present. No audible murmurs. RRR  Lungs: CTA B/L.   Abdomen: Soft, Non-Tender, Non-Distended. No palpable hepatosplenomegaly  Extremities: 2+ pulses. No edema  Skin: No Rashes. No petechiae  Neuro: No focal motor or sensory deficits.     Labs:  CBC Full  -  ( 14 Nov 2017 07:37 )  WBC Count : 11.57 K/uL  Hemoglobin : 9.2 g/dL  Hematocrit : 29.9 %  Platelet Count - Automated : 269 K/uL  Mean Cell Volume : 87.2 fl  Mean Cell Hemoglobin : 26.8 pg  Mean Cell Hemoglobin Concentration : 30.8 gm/dL  Auto Neutrophil # : x  Auto Lymphocyte # : x  Auto Monocyte # : x  Auto Eosinophil # : x  Auto Basophil # : x  Auto Neutrophil % : x  Auto Lymphocyte % : x  Auto Monocyte % : x  Auto Eosinophil % : x  Auto Basophil % : x    11-13    144  |  107  |  13  ----------------------------<  93  4.6   |  27  |  0.99    Ca    8.8      13 Nov 2017 10:38      % Saturation, Iron: 6 % (11.13.17 @ 14:48)  Iron - Total Binding Capacity.: 214 ug/dL (11.13.17 @ 14:48)  Iron Total, Serum: 12 ug/dL (11.13.17 @ 14:48)    Radiology:  Impression:          - Likely malignant partially obstructingtumor in the ascending colon.                        Biopsied. Tattooed.                       - Diverticulosis in the sigmoid colon, in the descending colon, in the                        transverse colon and in the ascending colon.       - The examined portion of the ileum was normal.                       - Internal hemorrhoids.  Recommendation:      - [facility] patient to [facility] for [facility] care.                       - Full liquid diet today.                       - F/U pathology                       - Continue to hold ASA/Plavix if possible                       - CT chest/ abdomen / pelvis for staging purposes    Pathology:  Surgical Pathology Report:   ACCESSION No:  [id number]  MICHELLE HERRERA                     1  Surgical Final Report  Final Diagnosis  1     Ascending colon mass biopsy:  - Invasive adenocarcinoma.    Note: Dr. Hayes has reviewed this case and concurred the diagnosis.    Verified by: Kristen Dasilva M.D.  (Electronic Signature)  Reported on: 11/14/17 17:57 [facility],[address] [place] [address]  [id number]

## 2017-11-15 NOTE — CONSULT NOTE ADULT - ASSESSMENT
89 year old man with melena found to have tumor in the ascending colon. Biopsy shows invasive adenocarcinoma.

## 2017-11-15 NOTE — CONSULT NOTE ADULT - PROBLEM SELECTOR RECOMMENDATION 2
Ascending colon mass. Biopsy consistent with adenocarcinoma  - CT Chest/Abdomen/Pelvis for staging  - Check CEA  - If isolated lesion, get Surgical Oncology Consult to discuss with family the risks/benefits of surgical resection  - Will call patient's son to discuss disease this afternoon  - Will follow    Henry Kothari MD  [facility] - A Division of [facility]   [address]  [place] [address]  (T) [phone]  (F) [phone]

## 2017-11-15 NOTE — PROGRESS NOTE ADULT - SUBJECTIVE AND OBJECTIVE BOX
Patient is a 89y old  Male who presents with a chief complaint of melena, abdominal pain (09 Nov 2017 23:38)  pt seen and examined at bedside   SUBJECTIVE/OVERNIGHT events none  Vital Signs Last 24 Hrs  T(C): 36.7 (15 Nov 2017 12:15), Max: 36.9 (14 Nov 2017 16:18)  T(F): 98 (15 Nov 2017 12:15), Max: 98.5 (14 Nov 2017 16:18)  HR: 58 (15 Nov 2017 12:15) (58 - 83)  BP: 126/69 (15 Nov 2017 12:15) (114/75 - 144/77)  BP(mean): --  RR: 18 (15 Nov 2017 12:15) (18 - 18)  SpO2: 95% (15 Nov 2017 12:15) (94% - 98%)  CAPILLARY BLOOD GLUCOSE        MEDICATIONS  (STANDING):  ascorbic acid 500 milliGRAM(s) Oral daily  atorvastatin 40 milliGRAM(s) Oral at bedtime  carvedilol 6.25 milliGRAM(s) Oral every 12 hours  ferrous    sulfate 325 milliGRAM(s) Oral two times a day with meals  furosemide    Tablet 20 milliGRAM(s) Oral every other day  multivitamin 1 Tablet(s) Oral daily    MEDICATIONS  (PRN):    PHYSICAL EXAM  General : comfortable, not in any acute distress, [place]  Neck : supple, no LAD, no JVD  Eyes : EOMI, PERRLA, conjunctiva : no icterus, no pallor  MM moist, no pharyngeal erythema/exudates  Pulmonary: clear to auscultation bilateral lung fields, no crackles/rhonchi/wheezing,   CVS : S1 S2,rate normal-,rhythm-regular, no murmurs, no abnormal sounds  Gastrointestinal: soft, non tender, non distended, no organomegaly , bowel sounds audible  Extremities: no edema  Neuro: AAOx3, no focal deficits  Musculoskeletal:  FROM of all ext  Skin: no rash  LABS                        9.2    11.57 )-----------( 269      ( 14 Nov 2017 07:37 )             29.9             RADIOLOGY and IMAGING reviewed: yes  Consultant notes reviewed : yes  Care discussed with Consultants/other providers :yes-GI, Onc

## 2017-11-15 NOTE — CONSULT NOTE ADULT - ASSESSMENT
89M PMHx CAD s/p stents, HTN, HLD, and CHF presenting w/ epigastric pain & melena, found to have R colon mass (invasive adenocarcinoma on biopsy). Surgical Oncology consulted for possible resection.       -will discuss w/ Attending  -Surgical Oncology following      AVE Robertson MD (PGY2)  Surgery Jr. Resident      (Please page [facility] team [phone] for questions/concerns.) 89M PMHx CAD s/p stents, HTN, HLD, and CHF presenting w/ epigastric pain & melena, found to have R colon mass (invasive adenocarcinoma on biopsy). Surgical Oncology consulted for possible resection.       -discussed w/ Attending; he will speak w/ patient & family (Chris Long [phone])  -Surgical Oncology following      AVE Robertson MD (PGY2)  Surgery Jr. Resident      (Please page [facility] team [phone] for questions/concerns.)

## 2017-11-16 DIAGNOSIS — Z01.810 ENCOUNTER FOR PREPROCEDURAL CARDIOVASCULAR EXAMINATION: ICD-10-CM

## 2017-11-16 LAB
ANION GAP SERPL CALC-SCNC: 10 MMOL/L — SIGNIFICANT CHANGE UP (ref 5–17)
BUN SERPL-MCNC: 17 MG/DL — SIGNIFICANT CHANGE UP (ref 7–23)
CALCIUM SERPL-MCNC: 8.1 MG/DL — LOW (ref 8.4–10.5)
CHLORIDE SERPL-SCNC: 103 MMOL/L — SIGNIFICANT CHANGE UP (ref 96–108)
CO2 SERPL-SCNC: 26 MMOL/L — SIGNIFICANT CHANGE UP (ref 22–31)
CREAT SERPL-MCNC: 0.9 MG/DL — SIGNIFICANT CHANGE UP (ref 0.5–1.3)
GLUCOSE SERPL-MCNC: 84 MG/DL — SIGNIFICANT CHANGE UP (ref 70–99)
HCT VFR BLD CALC: 27.4 % — LOW (ref 39–50)
HGB BLD-MCNC: 8.5 G/DL — LOW (ref 13–17)
MCHC RBC-ENTMCNC: 26.8 PG — LOW (ref 27–34)
MCHC RBC-ENTMCNC: 31 GM/DL — LOW (ref 32–36)
MCV RBC AUTO: 86.4 FL — SIGNIFICANT CHANGE UP (ref 80–100)
PLATELET # BLD AUTO: 243 K/UL — SIGNIFICANT CHANGE UP (ref 150–400)
POTASSIUM SERPL-MCNC: 4.2 MMOL/L — SIGNIFICANT CHANGE UP (ref 3.5–5.3)
POTASSIUM SERPL-SCNC: 4.2 MMOL/L — SIGNIFICANT CHANGE UP (ref 3.5–5.3)
RBC # BLD: 3.17 M/UL — LOW (ref 4.2–5.8)
RBC # FLD: 17.7 % — HIGH (ref 10.3–14.5)
SODIUM SERPL-SCNC: 139 MMOL/L — SIGNIFICANT CHANGE UP (ref 135–145)
WBC # BLD: 10.32 K/UL — SIGNIFICANT CHANGE UP (ref 3.8–10.5)
WBC # FLD AUTO: 10.32 K/UL — SIGNIFICANT CHANGE UP (ref 3.8–10.5)

## 2017-11-16 PROCEDURE — 99232 SBSQ HOSP IP/OBS MODERATE 35: CPT | Mod: 24

## 2017-11-16 RX ORDER — IRON SUCROSE 20 MG/ML
100 INJECTION, SOLUTION INTRAVENOUS ONCE
Qty: 0 | Refills: 0 | Status: COMPLETED | OUTPATIENT
Start: 2017-11-16 | End: 2017-11-16

## 2017-11-16 RX ADMIN — Medication 325 MILLIGRAM(S): at 18:41

## 2017-11-16 RX ADMIN — CARVEDILOL PHOSPHATE 6.25 MILLIGRAM(S): 80 CAPSULE, EXTENDED RELEASE ORAL at 05:11

## 2017-11-16 RX ADMIN — ATORVASTATIN CALCIUM 40 MILLIGRAM(S): 80 TABLET, FILM COATED ORAL at 22:42

## 2017-11-16 RX ADMIN — Medication 1 TABLET(S): at 14:49

## 2017-11-16 RX ADMIN — Medication 20 MILLIGRAM(S): at 14:50

## 2017-11-16 RX ADMIN — Medication 500 MILLIGRAM(S): at 14:49

## 2017-11-16 RX ADMIN — Medication 325 MILLIGRAM(S): at 09:27

## 2017-11-16 RX ADMIN — IRON SUCROSE 210 MILLIGRAM(S): 20 INJECTION, SOLUTION INTRAVENOUS at 22:44

## 2017-11-16 RX ADMIN — CARVEDILOL PHOSPHATE 6.25 MILLIGRAM(S): 80 CAPSULE, EXTENDED RELEASE ORAL at 18:41

## 2017-11-16 NOTE — CONSULT NOTE ADULT - SUBJECTIVE AND OBJECTIVE BOX
[facility] Cardiology Consult  _________________________    CC: abdominal pain.    HPI:  89 M h/o CAD s/p remote PCI (last stent 15 years ago) on ASA/plavix, HTN, HLD, recently diagnosed iron deficiency anemia who was a/w  2 days of epigastric pain and melena. We are consulted for preprocedural cardiac risk stratification prior to planned colonoscopy with biopsies for right colon mass.      PAST MEDICAL & SURGICAL HISTORY:  HLD (hyperlipidemia)  Hypertension  CAD (coronary artery disease)  No significant past surgical history      MEDICATIONS  (STANDING):  ascorbic acid 500 milliGRAM(s) Oral daily  atorvastatin 40 milliGRAM(s) Oral at bedtime  carvedilol 6.25 milliGRAM(s) Oral every 12 hours  ferrous    sulfate 325 milliGRAM(s) Oral two times a day with meals  furosemide    Tablet 20 milliGRAM(s) Oral every other day  multivitamin 1 Tablet(s) Oral daily    MEDICATIONS  (PRN):      Allergies    No Known Allergies    Intolerances      Social Histroy: Tobacco- , ETOH-, Illicit Drugs-    T(C): 36.6 (11-16-17 @ 05:10), Max: 36.7 (11-15-17 @ 12:15)  HR: 81 (11-16-17 @ 05:10) (58 - 84)  BP: 154/76 (11-16-17 @ 05:10) (114/60 - 154/76)  RR: 18 (11-16-17 @ 05:10) (18 - 18)  SpO2: 95% (11-16-17 @ 05:10) (95% - 96%)  I&O's Summary    15 Nov 2017 07:01  -  16 Nov 2017 07:00  --------------------------------------------------------  IN: 1080 mL / OUT: 0 mL / NET: 1080 mL        Review of Systems:  Constitutional: [ ] Fever [ ] Chills [ ] Fatigue [ ] Weight change   HEENT: [ ] Blurred vision [ ] Eye Pain [ ] Headache [ ] Runny nose [ ] Sore Throat   Respiratory: [ ] Cough [ ] Wheezing [ ] Shortness of breath  Cardiovascular: [ ] Chest Pain [ ] Palpitations [ ] IVY [ ] PND [ ] Orthopnea  Gastrointestinal: [ x] Abdominal Pain [ ] Diarrhea [ ] Constipation [ ] Hemorrhoids [ ] Nausea [ ] Vomiting  Genitourinary: [ ] Nocturia [ ] Dysuria [ ] Incontinence  Extremities: [ ] Swelling [ ] Joint Pain  Neurologic: [ ] Focal deficit [ ] Paresthesias [ ] Syncope  Lymphatic: [ ] Swelling [ ] Lymphadenopathy   Skin: [ ] Rash [ ] Ecchymoses [ ] Wounds [ ] Lesions  Psychiatry: [ ] Depression [ ] Suicidal/Homicidal Ideation [ ] Anxiety [ ] Sleep Disturbances  [x ] 10 point review of systems is otherwise negative except as mentioned above            [ ]Unable to obtain    PHYSICAL EXAM:  GENERAL: Alert, NAD  NECK: Supple, No JVD, No carotid bruit.  CHEST/LUNG: Clear to auscultation bilaterally; No wheezes, rales, or rhonchi  HEART: S1 S2 normal, RRR,  No murmurs, rubs, or gallops  ABDOMEN: Soft, Nontender, Nondistended; Bowel sounds present  EXTREMITIES:  No LE edema.      LABS:                        8.5    10.32 )-----------( 243      ( 16 Nov 2017 08:00 )             27.4         MEDICATIONS  (STANDING):  ascorbic acid 500 milliGRAM(s) Oral daily  atorvastatin 40 milliGRAM(s) Oral at bedtime  carvedilol 6.25 milliGRAM(s) Oral every 12 hours  ferrous    sulfate 325 milliGRAM(s) Oral two times a day with meals  furosemide    Tablet 20 milliGRAM(s) Oral every other day  multivitamin 1 Tablet(s) Oral daily    MEDICATIONS  (PRN):      RADIOLOGY & ADDITIONAL TESTS:    Cardiology testing:  EKG: sinus rhythm, RBBB, LAD. [facility] Cardiology Consult  _________________________    CC: abdominal pain.    HPI:  89 M h/o CAD s/p remote PCI (last stent 15 years ago) on ASA/plavix, HTN, HLD, ? prior CHF, ? EF recently diagnosed iron deficiency anemia who was a/w  2 days of epigastric pain and melena. We are consulted for preprocedural cardiac risk stratification prior to planned colonoscopy with biopsies for right colon mass. He denies any recent cardiac-related symptoms. No recent cardiac work-up in [place] or [facility].      PAST MEDICAL & SURGICAL HISTORY:  HLD (hyperlipidemia)  Hypertension  CAD (coronary artery disease)  No significant past surgical history      MEDICATIONS  (STANDING):  ascorbic acid 500 milliGRAM(s) Oral daily  atorvastatin 40 milliGRAM(s) Oral at bedtime  carvedilol 6.25 milliGRAM(s) Oral every 12 hours  ferrous    sulfate 325 milliGRAM(s) Oral two times a day with meals  furosemide    Tablet 20 milliGRAM(s) Oral every other day  multivitamin 1 Tablet(s) Oral daily    MEDICATIONS  (PRN):      Allergies    No Known Allergies    Intolerances      Social Histroy: Tobacco- , ETOH-, Illicit Drugs-    T(C): 36.6 (11-16-17 @ 05:10), Max: 36.7 (11-15-17 @ 12:15)  HR: 81 (11-16-17 @ 05:10) (58 - 84)  BP: 154/76 (11-16-17 @ 05:10) (114/60 - 154/76)  RR: 18 (11-16-17 @ 05:10) (18 - 18)  SpO2: 95% (11-16-17 @ 05:10) (95% - 96%)  I&O's Summary    15 Nov 2017 07:01  -  16 Nov 2017 07:00  --------------------------------------------------------  IN: 1080 mL / OUT: 0 mL / NET: 1080 mL        Review of Systems:  Constitutional: [ ] Fever [ ] Chills [ ] Fatigue [ ] Weight change   HEENT: [ ] Blurred vision [ ] Eye Pain [ ] Headache [ ] Runny nose [ ] Sore Throat   Respiratory: [ ] Cough [ ] Wheezing [ ] Shortness of breath  Cardiovascular: [ ] Chest Pain [ ] Palpitations [ ] IVY [ ] PND [ ] Orthopnea  Gastrointestinal: [ x] Abdominal Pain [ ] Diarrhea [ ] Constipation [ ] Hemorrhoids [ ] Nausea [ ] Vomiting  Genitourinary: [ ] Nocturia [ ] Dysuria [ ] Incontinence  Extremities: [ ] Swelling [ ] Joint Pain  Neurologic: [ ] Focal deficit [ ] Paresthesias [ ] Syncope  Lymphatic: [ ] Swelling [ ] Lymphadenopathy   Skin: [ ] Rash [ ] Ecchymoses [ ] Wounds [ ] Lesions  Psychiatry: [ ] Depression [ ] Suicidal/Homicidal Ideation [ ] Anxiety [ ] Sleep Disturbances  [x ] 10 point review of systems is otherwise negative except as mentioned above            [ ]Unable to obtain    PHYSICAL EXAM:  GENERAL: Alert, NAD  NECK: Supple, No JVD, No carotid bruit.  CHEST/LUNG: Clear to auscultation bilaterally; No wheezes, rales, or rhonchi  HEART: S1 S2 normal, RRR,  No murmurs, rubs, or gallops  ABDOMEN: Soft, Nontender, Nondistended; Bowel sounds present  EXTREMITIES:  No LE edema.      LABS:                        8.5    10.32 )-----------( 243      ( 16 Nov 2017 08:00 )             27.4         MEDICATIONS  (STANDING):  ascorbic acid 500 milliGRAM(s) Oral daily  atorvastatin 40 milliGRAM(s) Oral at bedtime  carvedilol 6.25 milliGRAM(s) Oral every 12 hours  ferrous    sulfate 325 milliGRAM(s) Oral two times a day with meals  furosemide    Tablet 20 milliGRAM(s) Oral every other day  multivitamin 1 Tablet(s) Oral daily    MEDICATIONS  (PRN):      RADIOLOGY & ADDITIONAL TESTS:    Cardiology testing:  EKG: sinus rhythm, RBBB, LAD. [facility] Cardiology Consult  _________________________    CC: abdominal pain.    HPI:  89 M h/o [place], CAD s/p remote CABG (1960's),  remote PCI (last stent > 15 years ago), HTN, HLD, ? prior CHF, ? EF, carotid stenosis, R CEA recently diagnosed iron deficiency anemia who was a/w  2 days of epigastric pain and melena. We are consulted for preprocedural cardiac risk stratification prior to planned colonoscopy with biopsies for right colon mass. He denies any recent cardiac-related symptoms. No recent cardiac work-up in [place] or [place]. He is a poor historian but denies any chest pain at rest or with exertion, dyspnea, orthopnea, PND or palpitations.     Cardiologist - Dr. Marcos Pierce.    PAST MEDICAL & SURGICAL HISTORY:  HLD (hyperlipidemia)  Hypertension  CAD (coronary artery disease)  No significant past surgical history      MEDICATIONS  (STANDING):  ascorbic acid 500 milliGRAM(s) Oral daily  atorvastatin 40 milliGRAM(s) Oral at bedtime  carvedilol 6.25 milliGRAM(s) Oral every 12 hours  ferrous    sulfate 325 milliGRAM(s) Oral two times a day with meals  furosemide    Tablet 20 milliGRAM(s) Oral every other day  multivitamin 1 Tablet(s) Oral daily    MEDICATIONS  (PRN):      Allergies    No Known Allergies    Intolerances      Social Histroy: Tobacco- , ETOH-, Illicit Drugs-    T(C): 36.6 (11-16-17 @ 05:10), Max: 36.7 (11-15-17 @ 12:15)  HR: 81 (11-16-17 @ 05:10) (58 - 84)  BP: 154/76 (11-16-17 @ 05:10) (114/60 - 154/76)  RR: 18 (11-16-17 @ 05:10) (18 - 18)  SpO2: 95% (11-16-17 @ 05:10) (95% - 96%)  I&O's Summary    15 Nov 2017 07:01  -  16 Nov 2017 07:00  --------------------------------------------------------  IN: 1080 mL / OUT: 0 mL / NET: 1080 mL        Review of Systems:  Constitutional: [ ] Fever [ ] Chills [ ] Fatigue [ ] Weight change   HEENT: [ ] Blurred vision [ ] Eye Pain [ ] Headache [ ] Runny nose [ ] Sore Throat   Respiratory: [ ] Cough [ ] Wheezing [ ] Shortness of breath  Cardiovascular: [ ] Chest Pain [ ] Palpitations [ ] IVY [ ] PND [ ] Orthopnea  Gastrointestinal: [ x] Abdominal Pain [ ] Diarrhea [ ] Constipation [ ] Hemorrhoids [ ] Nausea [ ] Vomiting  Genitourinary: [ ] Nocturia [ ] Dysuria [ ] Incontinence  Extremities: [ ] Swelling [ ] Joint Pain  Neurologic: [ ] Focal deficit [ ] Paresthesias [ ] Syncope  Lymphatic: [ ] Swelling [ ] Lymphadenopathy   Skin: [ ] Rash [ ] Ecchymoses [ ] Wounds [ ] Lesions  Psychiatry: [ ] Depression [ ] Suicidal/Homicidal Ideation [ ] Anxiety [ ] Sleep Disturbances  [x ] 10 point review of systems is otherwise negative except as mentioned above            [ ]Unable to obtain    PHYSICAL EXAM:  GENERAL: Alert, NAD  NECK: Supple, No JVD, No carotid bruit.  CHEST/LUNG: Clear to auscultation bilaterally; No wheezes, rales, or rhonchi  HEART: S1 S2 normal, RRR,  No murmurs, rubs, or gallops  ABDOMEN: Soft, Nontender, Nondistended; Bowel sounds present  EXTREMITIES:  No LE edema.      LABS:                        8.5    10.32 )-----------( 243      ( 16 Nov 2017 08:00 )             27.4         MEDICATIONS  (STANDING):  ascorbic acid 500 milliGRAM(s) Oral daily  atorvastatin 40 milliGRAM(s) Oral at bedtime  carvedilol 6.25 milliGRAM(s) Oral every 12 hours  ferrous    sulfate 325 milliGRAM(s) Oral two times a day with meals  furosemide    Tablet 20 milliGRAM(s) Oral every other day  multivitamin 1 Tablet(s) Oral daily    MEDICATIONS  (PRN):      RADIOLOGY & ADDITIONAL TESTS:    Cardiology testing:  EKG: sinus rhythm, RBBB, LAD. [facility] Cardiology Consult  _________________________    CC: abdominal pain.    HPI:  89 M h/o [place], CAD s/p remote CABG (approx 30 years ago),  multiple PCI (last stent > 15 years ago), HTN, HLD, HBV, carotid stenosis s/p R CEA 2004 recently diagnosed iron deficiency anemia who was a/w  2 days of epigastric pain and melena. We are consulted for presurgical cardiac risk stratification prior to planned hemicolectomy for R colon mass. He denies any recent cardiac-related symptoms. He is a poor historian but denies any chest pain at rest or with exertion, dyspnea, orthopnea, PND or palpitations.     Cardiologist - Dr. Marcos Pierce.    PAST MEDICAL & SURGICAL HISTORY:  HLD (hyperlipidemia)  Hypertension  CAD (coronary artery disease)  No significant past surgical history      MEDICATIONS  (STANDING):  ascorbic acid 500 milliGRAM(s) Oral daily  atorvastatin 40 milliGRAM(s) Oral at bedtime  carvedilol 6.25 milliGRAM(s) Oral every 12 hours  ferrous    sulfate 325 milliGRAM(s) Oral two times a day with meals  furosemide    Tablet 20 milliGRAM(s) Oral every other day  multivitamin 1 Tablet(s) Oral daily    MEDICATIONS  (PRN):      Allergies    No Known Allergies    Intolerances      Social Histroy: Tobacco- , ETOH-, Illicit Drugs-    T(C): 36.6 (11-16-17 @ 05:10), Max: 36.7 (11-15-17 @ 12:15)  HR: 81 (11-16-17 @ 05:10) (58 - 84)  BP: 154/76 (11-16-17 @ 05:10) (114/60 - 154/76)  RR: 18 (11-16-17 @ 05:10) (18 - 18)  SpO2: 95% (11-16-17 @ 05:10) (95% - 96%)  I&O's Summary    15 Nov 2017 07:01  -  16 Nov 2017 07:00  --------------------------------------------------------  IN: 1080 mL / OUT: 0 mL / NET: 1080 mL        Review of Systems:  Constitutional: [ ] Fever [ ] Chills [ ] Fatigue [ ] Weight change   HEENT: [ ] Blurred vision [ ] Eye Pain [ ] Headache [ ] Runny nose [ ] Sore Throat   Respiratory: [ ] Cough [ ] Wheezing [ ] Shortness of breath  Cardiovascular: [ ] Chest Pain [ ] Palpitations [ ] IVY [ ] PND [ ] Orthopnea  Gastrointestinal: [ x] Abdominal Pain [ ] Diarrhea [ ] Constipation [ ] Hemorrhoids [ ] Nausea [ ] Vomiting  Genitourinary: [ ] Nocturia [ ] Dysuria [ ] Incontinence  Extremities: [ ] Swelling [ ] Joint Pain  Neurologic: [ ] Focal deficit [ ] Paresthesias [ ] Syncope  Lymphatic: [ ] Swelling [ ] Lymphadenopathy   Skin: [ ] Rash [ ] Ecchymoses [ ] Wounds [ ] Lesions  Psychiatry: [ ] Depression [ ] Suicidal/Homicidal Ideation [ ] Anxiety [ ] Sleep Disturbances  [x ] 10 point review of systems is otherwise negative except as mentioned above            [ ]Unable to obtain    PHYSICAL EXAM:  GENERAL: Elderly M, very hard of hearing. NAD.  NECK: Supple, No JVD, No carotid bruit.  CHEST/LUNG: mild dry basilar crackles, otherwise clear.  HEART: S1 S2 normal, RRR,  faint systolic murmur at base.  ABDOMEN: Soft, Nontender, Nondistended; Bowel sounds present  EXTREMITIES: Trace LE edema      LABS:                        8.5    10.32 )-----------( 243      ( 16 Nov 2017 08:00 )             27.4         MEDICATIONS  (STANDING):  ascorbic acid 500 milliGRAM(s) Oral daily  atorvastatin 40 milliGRAM(s) Oral at bedtime  carvedilol 6.25 milliGRAM(s) Oral every 12 hours  ferrous    sulfate 325 milliGRAM(s) Oral two times a day with meals  furosemide    Tablet 20 milliGRAM(s) Oral every other day  multivitamin 1 Tablet(s) Oral daily    MEDICATIONS  (PRN):      RADIOLOGY & ADDITIONAL TESTS:    Cardiology testing:  EKG: sinus rhythm, RBBB, LAD.

## 2017-11-16 NOTE — PROGRESS NOTE ADULT - ASSESSMENT
89 year old man with R sided colon cancer w/o evidence of distant spread  Surgical notes reviewed.  Planning for right hemicolectomy.   Hold antiplatelet agents  Call with questions [phone]

## 2017-11-16 NOTE — CHART NOTE - NSCHARTNOTEFT_GEN_A_CORE
S/w pt hood Tashi.  He is agreeable to proceeding with laparoscopic right colectomy.  We discussed  the associated risks, benefits, and alternatives of the procedure. We also discussed potential complications and postoperative expectations.  Will schedule.

## 2017-11-16 NOTE — PROGRESS NOTE ADULT - PROBLEM SELECTOR PLAN 1
-- GI eval appreciated. hgb remains relatively stable.  Transfuse if Hgb < 7.  -- s/p egd and Colonoscopy 11/13 which shows ascending colon mass,   Biopsy shows adeno carcinoma  CT abdomen/chest no evidence of metastasis  Oncology Dr. Kothari consult appreciated  Surg-onc Dr Flores consulted  Cardiology c/s-for preop -Dr Rdz consulted  Acceptable risk. -- GI eval appreciated. hgb remains relatively stable.  Transfuse if Hgb < 7.  -- s/p egd and Colonoscopy 11/13 which shows ascending colon mass,   Biopsy shows adeno carcinoma  CT abdomen/chest no evidence of metastasis  Oncology Dr. Kothari consult appreciated  Surg-onc Dr Flores consulted  Cardiology c/s-for preop -Dr Rdz consulted  Acceptable risk for colectomy with the hope of cure.

## 2017-11-16 NOTE — PROGRESS NOTE ADULT - SUBJECTIVE AND OBJECTIVE BOX
Feels well  No further melena    Vital Signs Last 24 Hrs  T(C): 36.7 (16 Nov 2017 13:49), Max: 36.7 (15 Nov 2017 19:30)  T(F): 98.1 (16 Nov 2017 13:49), Max: 98.1 (15 Nov 2017 19:30)  HR: 70 (16 Nov 2017 13:49) (70 - 84)  BP: 125/73 (16 Nov 2017 13:49) (114/60 - 154/76)  BP(mean): --  RR: 18 (16 Nov 2017 13:49) (18 - 18)  SpO2: 95% (16 Nov 2017 13:49) (95% - 96%)    PHYSICAL EXAM:    General: comfortable in No acute distress  CV: regular rate and rhythm.  Lungs: clear to ascultation bilaterally  abdomen: soft nontender nondistened normal bowel sounds  ext: negative edema  skin: no rashes noted    MEDICATIONS  (STANDING):  ascorbic acid 500 milliGRAM(s) Oral daily  atorvastatin 40 milliGRAM(s) Oral at bedtime  carvedilol 6.25 milliGRAM(s) Oral every 12 hours  ferrous    sulfate 325 milliGRAM(s) Oral two times a day with meals  furosemide    Tablet 20 milliGRAM(s) Oral every other day  multivitamin 1 Tablet(s) Oral daily    MEDICATIONS  (PRN):      Allergies    No Known Allergies    Intolerances          LABS:                        8.5    10.32 )-----------( 243      ( 16 Nov 2017 08:00 )             27.4     11-16    139  |  103  |  17  ----------------------------<  84  4.2   |  26  |  0.90    Ca    8.1<L>      16 Nov 2017 08:00                        RADIOLOGY & ADDITIONAL TESTS:

## 2017-11-16 NOTE — CONSULT NOTE ADULT - ASSESSMENT
89 M h/o CAD s/p remote PCI (last stent 15 years ago) on ASA/plavix, HTN, HLD, recently diagnosed iron deficiency anemia who was a/w  2 days of epigastric pain and melena. We are consulted for preprocedural cardiac risk stratification prior to planned colonoscopy with biopsies for right colon mass. 89 M h/o [place], CAD s/p remote CABG (approx 30 years ago),  multiple PCI (last stent > 15 years ago), HTN, HLD, HBV, carotid stenosis s/p R CEA 2004 recently diagnosed iron deficiency anemia who was a/w  2 days of epigastric pain and melena. We are consulted for presurgical cardiac risk stratification prior to planned hemicolectomy for R colon mass.

## 2017-11-16 NOTE — CONSULT NOTE ADULT - PROBLEM SELECTOR RECOMMENDATION 9
Multifactorial anemia, secondary to LUCAS and to chronic inflammation  - Dx with LUCAS in [place]. given iron in the hospital  - Consider PO Ferrous Sulfate 325mg PO daily.
-  - the patient's cardiac issues appear relatively stable at this time.  - the patient had an office visit and echocardiogram with his cardiologist 2/2016.  I reviewed that office visit note. Echo at that time showed Mildly dilated left atrium, Mild AI and MR LV wall thickness 1.2 cm with noraml wall motion and EF of 65-70%, normal PASP.  - Pt has chronic stable coronary disease. Cont. to hold antiplatelets.   - cont other home cardiac meds - coreg, statin, imdur, amlodipine.  - The patient is an acceptable cardiac risk for the planned procedure. No further cardiac work-up prior to planned hemicolectomy.    Bharath Rdz M.D., [facility]  [phone]    A total of 80 minutes of face-to-face time was spent with the patient during this encounter -over half of that time was spent in counseling and coordination of care.

## 2017-11-16 NOTE — PROGRESS NOTE ADULT - SUBJECTIVE AND OBJECTIVE BOX
Patient is a 89y old  Male who presents with a chief complaint of melena, abdominal pain (09 Nov 2017 23:38)      SUBJECTIVE / OVERNIGHT EVENTS:  Pt seen and examined at bedside. Feels well.   No chest pain, no shortness of breath.   No overnight event.   No N/V/D. No abdominal pain.   Agreeable to the surgery.       Vital Signs Last 24 Hrs  T(C): 36.7 (16 Nov 2017 13:49), Max: 36.7 (15 Nov 2017 19:30)  T(F): 98.1 (16 Nov 2017 13:49), Max: 98.1 (15 Nov 2017 19:30)  HR: 70 (16 Nov 2017 13:49) (70 - 84)  BP: 125/73 (16 Nov 2017 13:49) (114/60 - 154/76)  BP(mean): --  RR: 18 (16 Nov 2017 13:49) (18 - 18)  SpO2: 95% (16 Nov 2017 13:49) (95% - 96%)  I&O's Summary    15 Nov 2017 07:01  -  16 Nov 2017 07:00  --------------------------------------------------------  IN: 1080 mL / OUT: 0 mL / NET: 1080 mL        PHYSICAL EXAM:  GENERAL: NAD, Comfortable  HEAD:  Atraumatic, Normocephalic  EYES: EOMI, PERRLA, conjunctiva and sclera clear  NECK: Supple, No JVD  CHEST/LUNG: Clear to auscultation bilaterally; No wheeze  HEART: Regular rate and rhythm; No murmurs, rubs, or gallops  ABDOMEN: Soft, Nontender, Nondistended; Bowel sounds present  Neuro: AAO x 3, no focal deficit, 5/5 b/l extremities  EXTREMITIES:  2+ Peripheral Pulses, No clubbing, cyanosis, or edema  SKIN: No rashes or lesions    LABS:                        8.5    10.32 )-----------( 243      ( 16 Nov 2017 08:00 )             27.4     11-16    139  |  103  |  17  ----------------------------<  84  4.2   |  26  |  0.90    Ca    8.1<L>      16 Nov 2017 08:00        CAPILLARY BLOOD GLUCOSE                RADIOLOGY & ADDITIONAL TESTS:    Imaging Personally Reviewed:  [x] YES  [ ] NO    Consultant(s) Notes Reviewed:  [x] YES  [ ] NO      MEDICATIONS  (STANDING):  ascorbic acid 500 milliGRAM(s) Oral daily  atorvastatin 40 milliGRAM(s) Oral at bedtime  carvedilol 6.25 milliGRAM(s) Oral every 12 hours  ferrous    sulfate 325 milliGRAM(s) Oral two times a day with meals  furosemide    Tablet 20 milliGRAM(s) Oral every other day  multivitamin 1 Tablet(s) Oral daily    MEDICATIONS  (PRN):      Care Discussed with Consultants/Other Providers [x] YES  [ ] NO    HEALTH ISSUES - PROBLEM Dx:  Preoperative cardiovascular examination: Preoperative cardiovascular examination  Colon adenocarcinoma: Colon adenocarcinoma  Nutrition, metabolism, and development symptoms: Nutrition, metabolism, and development symptoms  Prophylactic measure: Prophylactic measure  Essential hypertension: Essential hypertension  HLD (hyperlipidemia): HLD (hyperlipidemia)  CAD (coronary artery disease): CAD (coronary artery disease)  Anemia: Anemia  Epigastric abdominal pain: Epigastric abdominal pain  Melena: Melena

## 2017-11-16 NOTE — PROGRESS NOTE ADULT - SUBJECTIVE AND OBJECTIVE BOX
SURGICAL ONCOLOGY PROGRESS NOTE    No new complaints    Vital Signs Last 24 Hrs  T(C): 36.6 (16 Nov 2017 05:10), Max: 36.7 (15 Nov 2017 12:15)  T(F): 97.8 (16 Nov 2017 05:10), Max: 98.1 (15 Nov 2017 19:30)  HR: 81 (16 Nov 2017 05:10) (58 - 84)  BP: 154/76 (16 Nov 2017 05:10) (114/60 - 154/76)  BP(mean): --  RR: 18 (16 Nov 2017 05:10) (18 - 18)  SpO2: 95% (16 Nov 2017 05:10) (95% - 96%)  I&O's Detail    15 Nov 2017 07:01  -  16 Nov 2017 07:00  --------------------------------------------------------  IN:    Oral Fluid: 1080 mL  Total IN: 1080 mL    OUT:  Total OUT: 0 mL    Total NET: 1080 mL          PE:    A&A  NAD    soft, NT, ND, No peritoneal signs                            9.2    11.57 )-----------( 269      ( 14 Nov 2017 07:37 )             29.9

## 2017-11-17 PROCEDURE — 99232 SBSQ HOSP IP/OBS MODERATE 35: CPT | Mod: 24

## 2017-11-17 RX ORDER — SENNA PLUS 8.6 MG/1
2 TABLET ORAL AT BEDTIME
Qty: 0 | Refills: 0 | Status: DISCONTINUED | OUTPATIENT
Start: 2017-11-17 | End: 2017-11-20

## 2017-11-17 RX ADMIN — Medication 325 MILLIGRAM(S): at 17:56

## 2017-11-17 RX ADMIN — SENNA PLUS 2 TABLET(S): 8.6 TABLET ORAL at 19:52

## 2017-11-17 RX ADMIN — Medication 500 MILLIGRAM(S): at 13:11

## 2017-11-17 RX ADMIN — ATORVASTATIN CALCIUM 40 MILLIGRAM(S): 80 TABLET, FILM COATED ORAL at 19:52

## 2017-11-17 RX ADMIN — Medication 1 TABLET(S): at 13:11

## 2017-11-17 RX ADMIN — CARVEDILOL PHOSPHATE 6.25 MILLIGRAM(S): 80 CAPSULE, EXTENDED RELEASE ORAL at 17:56

## 2017-11-17 RX ADMIN — Medication 325 MILLIGRAM(S): at 13:13

## 2017-11-17 RX ADMIN — CARVEDILOL PHOSPHATE 6.25 MILLIGRAM(S): 80 CAPSULE, EXTENDED RELEASE ORAL at 05:47

## 2017-11-17 NOTE — PROGRESS NOTE ADULT - ASSESSMENT
89M PMHx CAD s/p stents, HTN, HLD, and CHF presenting w/ epigastric pain & melena, found to have R colon mass (invasive adenocarcinoma on biopsy).    -surgery discussed with Dr. Flores, patient, and patient's son  -patient amenable to right hemicolectomy   -appreciate cardiac risk stratification  -will discuss scheduling with Dr. Flores

## 2017-11-17 NOTE — PROGRESS NOTE ADULT - ATTENDING COMMENTS
Planning OR Monday 11/20 at 2:30 pm.    D/w pt and son  plan for lap right colectomy at length    Discussed r/b/a post op expectations poss complications.      Pt understands and agrees to proceed.

## 2017-11-17 NOTE — PROGRESS NOTE ADULT - PROBLEM SELECTOR PLAN 1
-- GI eval appreciated. hgb remains relatively stable.  Transfuse if Hgb < 7.  -- s/p egd and Colonoscopy 11/13 which shows ascending colon mass,   -- Biopsy shows adeno carcinoma  -- CT abdomen/chest no evidence of metastasis  -- Oncology Dr. Kothari consult appreciated  -- Surg-onc Dr Flores consulted  -- Cardiology c/s-for preop -Dr Rdz consulted  -- Acceptable risk for colectomy with the hope of cure.  -- surg/onc f/u.

## 2017-11-17 NOTE — PROGRESS NOTE ADULT - SUBJECTIVE AND OBJECTIVE BOX
Patient is a 89y old  Male who presents with a chief complaint of melena, abdominal pain (09 Nov 2017 23:38)      SUBJECTIVE / OVERNIGHT EVENTS:  No overnight events.  Pt feels well. Constipated for 3 days.   Denied cp, sob, n/v/d.  no abdominal pain. No HA/dizziness.       Vital Signs Last 24 Hrs  T(C): 36.5 (17 Nov 2017 05:46), Max: 36.8 (16 Nov 2017 19:22)  T(F): 97.7 (17 Nov 2017 05:46), Max: 98.3 (16 Nov 2017 19:22)  HR: 80 (17 Nov 2017 05:46) (70 - 80)  BP: 155/78 (17 Nov 2017 05:46) (125/73 - 155/78)  BP(mean): --  RR: 18 (17 Nov 2017 05:46) (18 - 18)  SpO2: 98% (17 Nov 2017 05:46) (95% - 98%)  I&O's Summary    16 Nov 2017 07:01  -  17 Nov 2017 07:00  --------------------------------------------------------  IN: 220 mL / OUT: 350 mL / NET: -130 mL        PHYSICAL EXAM:  GENERAL: NAD, Comfortable  HEAD:  Atraumatic, Normocephalic  EYES: EOMI, PERRLA, conjunctiva and sclera clear  NECK: Supple, No JVD  CHEST/LUNG: Clear to auscultation bilaterally; No wheeze  HEART: Regular rate and rhythm; No murmurs, rubs, or gallops  ABDOMEN: Soft, Nontender, Nondistended; Bowel sounds present  Neuro: AAO x 3, no focal deficit, 5/5 b/l extremities  EXTREMITIES:  2+ Peripheral Pulses, No clubbing, cyanosis, or edema  SKIN: No rashes or lesions    LABS:                        8.5    10.32 )-----------( 243      ( 16 Nov 2017 08:00 )             27.4     11-16    139  |  103  |  17  ----------------------------<  84  4.2   |  26  |  0.90    Ca    8.1<L>      16 Nov 2017 08:00        CAPILLARY BLOOD GLUCOSE                RADIOLOGY & ADDITIONAL TESTS:    Imaging Personally Reviewed:  [x] YES  [ ] NO    Consultant(s) Notes Reviewed:  [x] YES  [ ] NO      MEDICATIONS  (STANDING):  ascorbic acid 500 milliGRAM(s) Oral daily  atorvastatin 40 milliGRAM(s) Oral at bedtime  carvedilol 6.25 milliGRAM(s) Oral every 12 hours  ferrous    sulfate 325 milliGRAM(s) Oral two times a day with meals  furosemide    Tablet 20 milliGRAM(s) Oral every other day  multivitamin 1 Tablet(s) Oral daily    MEDICATIONS  (PRN):      Care Discussed with Consultants/Other Providers [x] YES  [ ] NO    HEALTH ISSUES - PROBLEM Dx:  Preoperative cardiovascular examination: Preoperative cardiovascular examination  Colon adenocarcinoma: Colon adenocarcinoma  Nutrition, metabolism, and development symptoms: Nutrition, metabolism, and development symptoms  Prophylactic measure: Prophylactic measure  Essential hypertension: Essential hypertension  HLD (hyperlipidemia): HLD (hyperlipidemia)  CAD (coronary artery disease): CAD (coronary artery disease)  Anemia: Anemia  Epigastric abdominal pain: Epigastric abdominal pain  Melena: Melena

## 2017-11-17 NOTE — PROGRESS NOTE ADULT - SUBJECTIVE AND OBJECTIVE BOX
Surgical Oncology Progress Note     S: No events overnight, patient resting comfortably.  Discussed surgery with the patient. Is aware of the risks and benefits and prefers to "get it over with" sooner rather than later.   Reports not having a bowel movement for the past three days and having some decreased appetite.  Denies N/V/ pain anywhere.     MEDICATIONS  (STANDING):  ascorbic acid 500 milliGRAM(s) Oral daily  atorvastatin 40 milliGRAM(s) Oral at bedtime  carvedilol 6.25 milliGRAM(s) Oral every 12 hours  ferrous    sulfate 325 milliGRAM(s) Oral two times a day with meals  furosemide    Tablet 20 milliGRAM(s) Oral every other day  multivitamin 1 Tablet(s) Oral daily    MEDICATIONS  (PRN):      Physical Exam:    Vital Signs Last 24 Hrs  T(C): 36.5 (17 Nov 2017 05:46), Max: 36.8 (16 Nov 2017 19:22)  T(F): 97.7 (17 Nov 2017 05:46), Max: 98.3 (16 Nov 2017 19:22)  HR: 80 (17 Nov 2017 05:46) (70 - 80)  BP: 155/78 (17 Nov 2017 05:46) (125/73 - 155/78)  BP(mean): --  RR: 18 (17 Nov 2017 05:46) (18 - 18)  SpO2: 98% (17 Nov 2017 05:46) (95% - 98%)    11-15-17 @ 07:01  -  11-16-17 @ 07:00  --------------------------------------------------------  IN: 1080 mL / OUT: 0 mL / NET: 1080 mL    11-16-17 @ 07:01  -  11-17-17 @ 06:29  --------------------------------------------------------  IN: 220 mL / OUT: 350 mL / NET: -130 mL        Gen: No acute distress, alert and oriented  Abdominal: Soft, not tender, not distended    LABS:                        8.5    10.32 )-----------( 243      ( 16 Nov 2017 08:00 )             27.4     11-16    139  |  103  |  17  ----------------------------<  84  4.2   |  26  |  0.90    Ca    8.1<L>      16 Nov 2017 08:00

## 2017-11-17 NOTE — PROGRESS NOTE ADULT - PROBLEM SELECTOR PLAN 2
CT scans shows isolated disease  - Surgical oncology consult appreciated.  - For surgical resection  - Discussed with hood Perez and encouraged surgery for cure  - Will follow up post-operatively to discuss if chemotherapy is needed post-surgery.  - Will follow    Henry Kothari MD  [facility] - A Division of [facility]   [address] [place] [address] [address] [place] [id number]  (T) [phone]  (F) [phone]

## 2017-11-17 NOTE — PROGRESS NOTE ADULT - ASSESSMENT
89 year old man with melena found to have tumor in the ascending colon. Biopsy shows invasive adenocarcinoma. Staging studies shows isolated lesion. For surgical resection

## 2017-11-17 NOTE — PROGRESS NOTE ADULT - SUBJECTIVE AND OBJECTIVE BOX
Feels well  No further melena    Vital Signs Last 24 Hrs  T(C): 36.5 (17 Nov 2017 05:46), Max: 36.8 (16 Nov 2017 19:22)  T(F): 97.7 (17 Nov 2017 05:46), Max: 98.3 (16 Nov 2017 19:22)  HR: 80 (17 Nov 2017 05:46) (70 - 80)  BP: 155/78 (17 Nov 2017 05:46) (125/73 - 155/78)  BP(mean): --  RR: 18 (17 Nov 2017 05:46) (18 - 18)  SpO2: 98% (17 Nov 2017 05:46) (95% - 98%)    PHYSICAL EXAM:    General: comfortable in No acute distress  CV: regular rate and rhythm.  Lungs: clear to ascultation bilaterally  abdomen: soft nontender nondistened normal bowel sounds  ext: negative edema  skin: no rashes noted    MEDICATIONS  (STANDING):  ascorbic acid 500 milliGRAM(s) Oral daily  atorvastatin 40 milliGRAM(s) Oral at bedtime  carvedilol 6.25 milliGRAM(s) Oral every 12 hours  ferrous    sulfate 325 milliGRAM(s) Oral two times a day with meals  furosemide    Tablet 20 milliGRAM(s) Oral every other day  multivitamin 1 Tablet(s) Oral daily    MEDICATIONS  (PRN):      Allergies    No Known Allergies    Intolerances          LABS:                        8.5    10.32 )-----------( 243      ( 16 Nov 2017 08:00 )             27.4     11-16    139  |  103  |  17  ----------------------------<  84  4.2   |  26  |  0.90    Ca    8.1<L>      16 Nov 2017 08:00          RADIOLOGY & ADDITIONAL TESTS:

## 2017-11-17 NOTE — PROGRESS NOTE ADULT - ASSESSMENT
89 year old man with R sided colon cancer w/o evidence of distant spread  Surgical notes reviewed.  Planning for right hemicolectomy.   Hold antiplatelet agents  Call with questions [phone]

## 2017-11-17 NOTE — PROGRESS NOTE ADULT - SUBJECTIVE AND OBJECTIVE BOX
[facility] - Renee Olivia / Rafael / Taye - [phone]  Primary Outpatient Hematologist/Oncologist: None    Subjective  Patient seen this morning. Feeling well. No complaints.     ROS:  General:  (-)Pain, (-)Decrease appeite, (-)Fevers, (-)Chills, (-)Weight Loss  Eyes: (-)Blurry Vision, (-)Double Vision, (-)Vision Loss  ENT: (-)Sinus Congestion, (-)Decreased Hearing, (-)Nosebleeds, (-)Sore Throat  Cardiac: (-)Chest Pain, (-)Palpitations, (-)Shortness of breathe on exertion  Respiratory: (-)Cough, (-)hemoptysis, (-)Shortness of breathe  GI: (-)Nausea, (-)Vomiting, (-)Diarrhea, (-)Constipation, (+)Melena, (-)BRBPR  : (-)Hematuria, (-)Dysuria, (-)Polyuia  MSK: (-)Back pain, (-)Joint pain, (-)Stiffness  Dermatology: (-)Rash, (-)Itching  Neurology:  (-)Numbness, (-)Tingling, (-)Difficulty Walking, (-)Tremors, (-)Weakness  Psych: (-)Anxiety, (-)Depression, (-)Memory Loss  Hematology:  (-)Easy Bruising, (-)Easy Bleeding, (-)Night Sweats.     MEDICATIONS  (STANDING):  ascorbic acid 500 milliGRAM(s) Oral daily  atorvastatin 40 milliGRAM(s) Oral at bedtime  carvedilol 6.25 milliGRAM(s) Oral every 12 hours  ferrous    sulfate 325 milliGRAM(s) Oral two times a day with meals  furosemide    Tablet 20 milliGRAM(s) Oral every other day  multivitamin 1 Tablet(s) Oral daily    Allergies  No Known Allergies    Vital Signs Last 24 Hrs  T(C): 36.5 (17 Nov 2017 05:46), Max: 36.8 (16 Nov 2017 19:22)  T(F): 97.7 (17 Nov 2017 05:46), Max: 98.3 (16 Nov 2017 19:22)  HR: 80 (17 Nov 2017 05:46) (70 - 80)  BP: 155/78 (17 Nov 2017 05:46) (125/73 - 155/78)  RR: 18 (17 Nov 2017 05:46) (18 - 18)  SpO2: 98% (17 Nov 2017 05:46) (95% - 98%)    Physical Exam:  General: AAO x 3. NAD. Sitting in chair comfortably. Hard of hearing.   HEENT: clear oropharynx, anicteric sclera, pink conjunctivae, EOMi. PERRLA  Cardiac: S1, S2 present. No audible murmurs. RRR  Lungs: CTA B/L.   Abdomen: Soft, Non-Tender, Non-Distended. No palpable hepatosplenomegaly  Extremities: 2+ pulses. No edema  Skin: No Rashes. No petechiae  Neuro: No focal motor or sensory deficits.     Labs:                        8.5    10.32 )-----------( 243      ( 16 Nov 2017 08:00 )             27.4   11-16    139  |  103  |  17  ----------------------------<  84  4.2   |  26  |  0.90    Ca    8.1<L>      16 Nov 2017 08:00    Radiology:  Colonoscopy  Impression:          - Likely malignant partially obstructingtumor in the ascending colon.                        Biopsied. Tattooed.                       - Diverticulosis in the sigmoid colon, in the descending colon, in the                        transverse colon and in the ascending colon.       - The examined portion of the ileum was normal.                       - Internal hemorrhoids.  Recommendation:      - Return patient to [facility] for ongoing care.                       - Full liquid diet today.                       - F/U pathology                       - Continue to hold ASA/Plavix if possible                       - CT chest/ abdomen / pelvis for staging purposes    CT Chest/Abdomen/Pelvis  - Isolated Lesion. No metastatic lesions seen.     Pathology:  Surgical Pathology Report:   ACCESSION No:  [id number]  MICHELLE HERRERA                     1  Surgical Final Report  Final Diagnosis  1     Ascending colon mass biopsy:  - Invasive adenocarcinoma.    Note: Dr. Hayes has reviewed this case and concurred the diagnosis.    Verified by: Kristen Dasilva M.D.  (Electronic Signature)  Reported on: 11/14/17 17:57 EST,[address] [place] [address]

## 2017-11-18 LAB
ANION GAP SERPL CALC-SCNC: 11 MMOL/L — SIGNIFICANT CHANGE UP (ref 5–17)
BASOPHILS # BLD AUTO: 0.02 K/UL — SIGNIFICANT CHANGE UP (ref 0–0.2)
BASOPHILS NFR BLD AUTO: 0.2 % — SIGNIFICANT CHANGE UP (ref 0–2)
BUN SERPL-MCNC: 18 MG/DL — SIGNIFICANT CHANGE UP (ref 7–23)
CALCIUM SERPL-MCNC: 8.2 MG/DL — LOW (ref 8.4–10.5)
CEA SERPL-MCNC: 164.2 NG/ML — HIGH (ref 0–3.8)
CHLORIDE SERPL-SCNC: 100 MMOL/L — SIGNIFICANT CHANGE UP (ref 96–108)
CO2 SERPL-SCNC: 25 MMOL/L — SIGNIFICANT CHANGE UP (ref 22–31)
CREAT SERPL-MCNC: 0.82 MG/DL — SIGNIFICANT CHANGE UP (ref 0.5–1.3)
EOSINOPHIL # BLD AUTO: 0.28 K/UL — SIGNIFICANT CHANGE UP (ref 0–0.5)
EOSINOPHIL NFR BLD AUTO: 2.9 — SIGNIFICANT CHANGE UP
GLUCOSE SERPL-MCNC: 82 MG/DL — SIGNIFICANT CHANGE UP (ref 70–99)
HCT VFR BLD CALC: 28.3 % — LOW (ref 39–50)
HGB BLD-MCNC: 8.6 G/DL — LOW (ref 13–17)
IMM GRANULOCYTES NFR BLD AUTO: 0.3 % — SIGNIFICANT CHANGE UP (ref 0–1.5)
LYMPHOCYTES # BLD AUTO: 1.29 K/UL — SIGNIFICANT CHANGE UP (ref 1–3.3)
LYMPHOCYTES # BLD AUTO: 13.6 % — SIGNIFICANT CHANGE UP (ref 13–44)
MCHC RBC-ENTMCNC: 26.7 PG — LOW (ref 27–34)
MCHC RBC-ENTMCNC: 30.4 GM/DL — LOW (ref 32–36)
MCV RBC AUTO: 87.9 FL — SIGNIFICANT CHANGE UP (ref 80–100)
MONOCYTES # BLD AUTO: 0.72 K/UL — SIGNIFICANT CHANGE UP (ref 0–0.9)
MONOCYTES NFR BLD AUTO: 7.6 % — SIGNIFICANT CHANGE UP (ref 2–14)
NEUTROPHILS # BLD AUTO: 7.16 K/UL — SIGNIFICANT CHANGE UP (ref 1.8–7.4)
NEUTROPHILS NFR BLD AUTO: 75.4 % — SIGNIFICANT CHANGE UP (ref 43–77)
PLATELET # BLD AUTO: 209 K/UL — SIGNIFICANT CHANGE UP (ref 150–400)
POTASSIUM SERPL-MCNC: 4.4 MMOL/L — SIGNIFICANT CHANGE UP (ref 3.5–5.3)
POTASSIUM SERPL-SCNC: 4.4 MMOL/L — SIGNIFICANT CHANGE UP (ref 3.5–5.3)
RBC # BLD: 3.22 M/UL — LOW (ref 4.2–5.8)
RBC # FLD: 16.9 % — HIGH (ref 10.3–14.5)
SODIUM SERPL-SCNC: 136 MMOL/L — SIGNIFICANT CHANGE UP (ref 135–145)
WBC # BLD: 9.5 K/UL — SIGNIFICANT CHANGE UP (ref 3.8–10.5)
WBC # FLD AUTO: 9.5 K/UL — SIGNIFICANT CHANGE UP (ref 3.8–10.5)

## 2017-11-18 PROCEDURE — 99232 SBSQ HOSP IP/OBS MODERATE 35: CPT

## 2017-11-18 RX ADMIN — SENNA PLUS 2 TABLET(S): 8.6 TABLET ORAL at 21:18

## 2017-11-18 RX ADMIN — Medication 325 MILLIGRAM(S): at 09:26

## 2017-11-18 RX ADMIN — CARVEDILOL PHOSPHATE 6.25 MILLIGRAM(S): 80 CAPSULE, EXTENDED RELEASE ORAL at 17:14

## 2017-11-18 RX ADMIN — Medication 500 MILLIGRAM(S): at 13:05

## 2017-11-18 RX ADMIN — CARVEDILOL PHOSPHATE 6.25 MILLIGRAM(S): 80 CAPSULE, EXTENDED RELEASE ORAL at 05:43

## 2017-11-18 RX ADMIN — Medication 1 TABLET(S): at 13:04

## 2017-11-18 RX ADMIN — Medication 20 MILLIGRAM(S): at 13:05

## 2017-11-18 RX ADMIN — ATORVASTATIN CALCIUM 40 MILLIGRAM(S): 80 TABLET, FILM COATED ORAL at 21:18

## 2017-11-18 RX ADMIN — Medication 325 MILLIGRAM(S): at 17:14

## 2017-11-18 NOTE — PROGRESS NOTE ADULT - SUBJECTIVE AND OBJECTIVE BOX
Patient is a 89y old  Male who presents with a chief complaint of melena, abdominal pain (09 Nov 2017 23:38)      SUBJECTIVE / OVERNIGHT EVENTS:  Pt seen and examined at bedside.   No overnight event.  Feeling better. Eating breakfast.  Very hard of hearing.   no cp, no sob, no n/v/d.       Vital Signs Last 24 Hrs  T(C): 36.6 (18 Nov 2017 05:41), Max: 36.8 (17 Nov 2017 19:42)  T(F): 97.8 (18 Nov 2017 05:41), Max: 98.3 (17 Nov 2017 19:42)  HR: 73 (18 Nov 2017 05:41) (59 - 73)  BP: 138/77 (18 Nov 2017 05:41) (130/70 - 138/77)  BP(mean): --  RR: 18 (18 Nov 2017 05:41) (18 - 18)  SpO2: 97% (18 Nov 2017 05:41) (96% - 98%)  I&O's Summary    17 Nov 2017 07:01  -  18 Nov 2017 07:00  --------------------------------------------------------  IN: 940 mL / OUT: 620 mL / NET: 320 mL        PHYSICAL EXAM:  GENERAL: NAD, Comfortable  HEAD:  Atraumatic, Normocephalic, hard of hearing.   EYES: EOMI, PERRLA, conjunctiva and sclera clear  NECK: Supple, No JVD  CHEST/LUNG: Clear to auscultation bilaterally; No wheeze  HEART: Regular rate and rhythm; No murmurs, rubs, or gallops  ABDOMEN: Soft, Nontender, Nondistended; Bowel sounds present  Neuro: AAO x 3, no focal deficit, 5/5 b/l extremities  EXTREMITIES:  2+ Peripheral Pulses, No clubbing, cyanosis, or edema  SKIN: No rashes or lesions    LABS:                        8.6    9.50  )-----------( 209      ( 18 Nov 2017 08:34 )             28.3             CAPILLARY BLOOD GLUCOSE                RADIOLOGY & ADDITIONAL TESTS:    Imaging Personally Reviewed:  [x] YES  [ ] NO    Consultant(s) Notes Reviewed:  [x] YES  [ ] NO      MEDICATIONS  (STANDING):  ascorbic acid 500 milliGRAM(s) Oral daily  atorvastatin 40 milliGRAM(s) Oral at bedtime  carvedilol 6.25 milliGRAM(s) Oral every 12 hours  ferrous    sulfate 325 milliGRAM(s) Oral two times a day with meals  furosemide    Tablet 20 milliGRAM(s) Oral every other day  multivitamin 1 Tablet(s) Oral daily  senna 2 Tablet(s) Oral at bedtime    MEDICATIONS  (PRN):      Care Discussed with Consultants/Other Providers [x] YES  [ ] NO    HEALTH ISSUES - PROBLEM Dx:  Preoperative cardiovascular examination: Preoperative cardiovascular examination  Colon adenocarcinoma: Colon adenocarcinoma  Nutrition, metabolism, and development symptoms: Nutrition, metabolism, and development symptoms  Prophylactic measure: Prophylactic measure  Essential hypertension: Essential hypertension  HLD (hyperlipidemia): HLD (hyperlipidemia)  CAD (coronary artery disease): CAD (coronary artery disease)  Anemia: Anemia  Epigastric abdominal pain: Epigastric abdominal pain  Melena: Melena

## 2017-11-18 NOTE — PROGRESS NOTE ADULT - PROBLEM SELECTOR PLAN 1
-- GI eval appreciated. hgb remains relatively stable.  Transfuse if Hgb < 7.  -- s/p egd and Colonoscopy 11/13 which shows ascending colon mass,   -- Biopsy shows adenocarcinoma  -- CT abdomen/chest shows no evidence of metastasis  -- Oncology Dr. Kothari consult appreciated  -- Surg-onc Dr Flores consulted  -- Cardiology c/s-for preop (Dr Rdz)  -- Acceptable risk for colectomy with the hope of cure.  -- surg/onc f/u appreciated.   -- Tentative hemicolectomy on Monday.

## 2017-11-18 NOTE — PROGRESS NOTE ADULT - SUBJECTIVE AND OBJECTIVE BOX
Surgical Oncology Progress Note     S: No events overnight, patient resting comfortably in bed. Patient reports mild abdominal discomfort. States he had one black bowel movement yesterday. Is prepared for surgery on Monday.    MEDICATIONS  (STANDING):  ascorbic acid 500 milliGRAM(s) Oral daily  atorvastatin 40 milliGRAM(s) Oral at bedtime  carvedilol 6.25 milliGRAM(s) Oral every 12 hours  ferrous    sulfate 325 milliGRAM(s) Oral two times a day with meals  furosemide    Tablet 20 milliGRAM(s) Oral every other day  multivitamin 1 Tablet(s) Oral daily  senna 2 Tablet(s) Oral at bedtime    MEDICATIONS  (PRN):      Physical Exam:    Vital Signs Last 24 Hrs  T(C): 36.6 (18 Nov 2017 05:41), Max: 36.8 (17 Nov 2017 19:42)  T(F): 97.8 (18 Nov 2017 05:41), Max: 98.3 (17 Nov 2017 19:42)  HR: 73 (18 Nov 2017 05:41) (59 - 73)  BP: 138/77 (18 Nov 2017 05:41) (130/70 - 138/77)  BP(mean): --  RR: 18 (18 Nov 2017 05:41) (18 - 18)  SpO2: 97% (18 Nov 2017 05:41) (96% - 98%)    11-17-17 @ 07:01  -  11-18-17 @ 07:00  --------------------------------------------------------  IN: 940 mL / OUT: 620 mL / NET: 320 mL        Gen: NAD, alert and oriented  Abdominal: Softly distended, mild tenderness in the right upper and lower quadrants    LABS:

## 2017-11-18 NOTE — PROGRESS NOTE ADULT - ASSESSMENT
89M PMHx CAD s/p stents, HTN, HLD, and CHF presenting w/ epigastric pain & melena, found to have R colon mass (invasive adenocarcinoma on biopsy).    Patient scheduled for a laparoscopic, possible open right colectomy for Monday 11/20 at 2:30PM    Will pre-op and consent for Monday    Appreciate cardiac risk stratification    Surgery team to follow    Discussed with Dr. Flores

## 2017-11-19 LAB
ANION GAP SERPL CALC-SCNC: 12 MMOL/L — SIGNIFICANT CHANGE UP (ref 5–17)
APTT BLD: 30.2 SEC — SIGNIFICANT CHANGE UP (ref 27.5–37.4)
BASOPHILS # BLD AUTO: 0.03 K/UL — SIGNIFICANT CHANGE UP (ref 0–0.2)
BASOPHILS NFR BLD AUTO: 0.4 % — SIGNIFICANT CHANGE UP (ref 0–2)
BLD GP AB SCN SERPL QL: NEGATIVE — SIGNIFICANT CHANGE UP
BUN SERPL-MCNC: 19 MG/DL — SIGNIFICANT CHANGE UP (ref 7–23)
CALCIUM SERPL-MCNC: 8.6 MG/DL — SIGNIFICANT CHANGE UP (ref 8.4–10.5)
CHLORIDE SERPL-SCNC: 100 MMOL/L — SIGNIFICANT CHANGE UP (ref 96–108)
CO2 SERPL-SCNC: 25 MMOL/L — SIGNIFICANT CHANGE UP (ref 22–31)
CREAT SERPL-MCNC: 1.01 MG/DL — SIGNIFICANT CHANGE UP (ref 0.5–1.3)
EOSINOPHIL # BLD AUTO: 0.21 K/UL — SIGNIFICANT CHANGE UP (ref 0–0.5)
EOSINOPHIL NFR BLD AUTO: 2.6 % — SIGNIFICANT CHANGE UP (ref 0–6)
GLUCOSE SERPL-MCNC: 80 MG/DL — SIGNIFICANT CHANGE UP (ref 70–99)
HCT VFR BLD CALC: 29.3 % — LOW (ref 39–50)
HGB BLD-MCNC: 9 G/DL — LOW (ref 13–17)
IMM GRANULOCYTES NFR BLD AUTO: 0.1 % — SIGNIFICANT CHANGE UP (ref 0–1.5)
INR BLD: 1.11 RATIO — SIGNIFICANT CHANGE UP (ref 0.88–1.16)
LYMPHOCYTES # BLD AUTO: 0.95 K/UL — LOW (ref 1–3.3)
LYMPHOCYTES # BLD AUTO: 11.7 % — LOW (ref 13–44)
MCHC RBC-ENTMCNC: 26.8 PG — LOW (ref 27–34)
MCHC RBC-ENTMCNC: 30.7 GM/DL — LOW (ref 32–36)
MCV RBC AUTO: 87.2 FL — SIGNIFICANT CHANGE UP (ref 80–100)
MONOCYTES # BLD AUTO: 0.6 K/UL — SIGNIFICANT CHANGE UP (ref 0–0.9)
MONOCYTES NFR BLD AUTO: 7.4 % — SIGNIFICANT CHANGE UP (ref 2–14)
NEUTROPHILS # BLD AUTO: 6.33 K/UL — SIGNIFICANT CHANGE UP (ref 1.8–7.4)
NEUTROPHILS NFR BLD AUTO: 77.8 % — HIGH (ref 43–77)
PLATELET # BLD AUTO: 269 K/UL — SIGNIFICANT CHANGE UP (ref 150–400)
POTASSIUM SERPL-MCNC: 4.2 MMOL/L — SIGNIFICANT CHANGE UP (ref 3.5–5.3)
POTASSIUM SERPL-SCNC: 4.2 MMOL/L — SIGNIFICANT CHANGE UP (ref 3.5–5.3)
PROTHROM AB SERPL-ACNC: 12.6 SEC — SIGNIFICANT CHANGE UP (ref 10–13.1)
RBC # BLD: 3.36 M/UL — LOW (ref 4.2–5.8)
RBC # FLD: 17.2 % — HIGH (ref 10.3–14.5)
RH IG SCN BLD-IMP: NEGATIVE — SIGNIFICANT CHANGE UP
SODIUM SERPL-SCNC: 137 MMOL/L — SIGNIFICANT CHANGE UP (ref 135–145)
WBC # BLD: 8.13 K/UL — SIGNIFICANT CHANGE UP (ref 3.8–10.5)
WBC # FLD AUTO: 8.13 K/UL — SIGNIFICANT CHANGE UP (ref 3.8–10.5)

## 2017-11-19 PROCEDURE — 99232 SBSQ HOSP IP/OBS MODERATE 35: CPT

## 2017-11-19 RX ORDER — NEOMYCIN SULFATE 500 MG/1
1000 TABLET ORAL ONCE
Qty: 0 | Refills: 0 | Status: COMPLETED | OUTPATIENT
Start: 2017-11-19 | End: 2017-11-19

## 2017-11-19 RX ORDER — METRONIDAZOLE 500 MG
500 TABLET ORAL ONCE
Qty: 0 | Refills: 0 | Status: COMPLETED | OUTPATIENT
Start: 2017-11-19 | End: 2017-11-19

## 2017-11-19 RX ORDER — SOD SULF/SODIUM/NAHCO3/KCL/PEG
4000 SOLUTION, RECONSTITUTED, ORAL ORAL ONCE
Qty: 0 | Refills: 0 | Status: DISCONTINUED | OUTPATIENT
Start: 2017-11-19 | End: 2017-11-19

## 2017-11-19 RX ORDER — SODIUM CHLORIDE 9 MG/ML
1000 INJECTION INTRAMUSCULAR; INTRAVENOUS; SUBCUTANEOUS
Qty: 0 | Refills: 0 | Status: DISCONTINUED | OUTPATIENT
Start: 2017-11-19 | End: 2017-11-20

## 2017-11-19 RX ORDER — SOD SULF/SODIUM/NAHCO3/KCL/PEG
2000 SOLUTION, RECONSTITUTED, ORAL ORAL ONCE
Qty: 0 | Refills: 0 | Status: COMPLETED | OUTPATIENT
Start: 2017-11-19 | End: 2017-11-19

## 2017-11-19 RX ADMIN — SENNA PLUS 2 TABLET(S): 8.6 TABLET ORAL at 22:16

## 2017-11-19 RX ADMIN — Medication 500 MILLIGRAM(S): at 11:38

## 2017-11-19 RX ADMIN — ATORVASTATIN CALCIUM 40 MILLIGRAM(S): 80 TABLET, FILM COATED ORAL at 22:17

## 2017-11-19 RX ADMIN — Medication 500 MILLIGRAM(S): at 14:38

## 2017-11-19 RX ADMIN — Medication 500 MILLIGRAM(S): at 22:17

## 2017-11-19 RX ADMIN — Medication 325 MILLIGRAM(S): at 10:37

## 2017-11-19 RX ADMIN — NEOMYCIN SULFATE 1000 MILLIGRAM(S): 500 TABLET ORAL at 13:12

## 2017-11-19 RX ADMIN — CARVEDILOL PHOSPHATE 6.25 MILLIGRAM(S): 80 CAPSULE, EXTENDED RELEASE ORAL at 06:08

## 2017-11-19 RX ADMIN — Medication 1 TABLET(S): at 11:38

## 2017-11-19 RX ADMIN — Medication 500 MILLIGRAM(S): at 13:12

## 2017-11-19 RX ADMIN — Medication 325 MILLIGRAM(S): at 16:51

## 2017-11-19 RX ADMIN — NEOMYCIN SULFATE 1000 MILLIGRAM(S): 500 TABLET ORAL at 14:38

## 2017-11-19 RX ADMIN — Medication 2000 MILLILITER(S): at 16:51

## 2017-11-19 RX ADMIN — NEOMYCIN SULFATE 1000 MILLIGRAM(S): 500 TABLET ORAL at 22:17

## 2017-11-19 RX ADMIN — CARVEDILOL PHOSPHATE 6.25 MILLIGRAM(S): 80 CAPSULE, EXTENDED RELEASE ORAL at 18:03

## 2017-11-19 NOTE — PROGRESS NOTE ADULT - PROBLEM SELECTOR PLAN 1
-- GI eval appreciated. hgb remains relatively stable.    -- s/p egd and Colonoscopy 11/13 which shows ascending colon mass,   -- Biopsy shows adenocarcinoma  -- CT abdomen/chest shows no evidence of metastasis  -- Oncology Dr. Kothari consult appreciated  -- Surg-onc Dr Flores consulted  -- Cardiology c/s-for preop (Dr Rdz)  -- Acceptable risk for colectomy with the hope of cure.  -- surg/onc f/u appreciated.   -- Tentative hemicolectomy on Monday.  -- Bowel prep and pre-surgery prep.  -- discussed at length the need for proper bowel prep.

## 2017-11-19 NOTE — PROGRESS NOTE ADULT - SUBJECTIVE AND OBJECTIVE BOX
Surgery Progress Note    S: Patient seen and examined. No acute events overnight. Pain well controlled with current regimen. Denies nausea/vomiting.     O:  Vital Signs Last 24 Hrs  T(C): 36.3 (19 Nov 2017 06:03), Max: 36.7 (18 Nov 2017 20:22)  T(F): 97.4 (19 Nov 2017 06:03), Max: 98 (18 Nov 2017 20:22)  HR: 61 (19 Nov 2017 06:03) (55 - 67)  BP: 127/64 (19 Nov 2017 06:03) (107/67 - 147/55)  BP(mean): --  RR: 18 (19 Nov 2017 06:03) (18 - 18)  SpO2: 96% (19 Nov 2017 06:03) (96% - 98%)    I&O's Detail    18 Nov 2017 07:01  -  19 Nov 2017 07:00  --------------------------------------------------------  IN:    Oral Fluid: 860 mL  Total IN: 860 mL    OUT:    Voided: 1050 mL  Total OUT: 1050 mL    Total NET: -190 mL          MEDICATIONS  (STANDING):  ascorbic acid 500 milliGRAM(s) Oral daily  atorvastatin 40 milliGRAM(s) Oral at bedtime  carvedilol 6.25 milliGRAM(s) Oral every 12 hours  ferrous    sulfate 325 milliGRAM(s) Oral two times a day with meals  furosemide    Tablet 20 milliGRAM(s) Oral every other day  metroNIDAZOLE    Tablet 500 milliGRAM(s) Oral once  metroNIDAZOLE    Tablet 500 milliGRAM(s) Oral once  metroNIDAZOLE    Tablet 500 milliGRAM(s) Oral once  multivitamin 1 Tablet(s) Oral daily  neomycin 1000 milliGRAM(s) Oral once  neomycin 1000 milliGRAM(s) Oral once  neomycin 1000 milliGRAM(s) Oral once  polyethylene glycol/electrolyte Solution. 4000 milliLiter(s) Oral once  senna 2 Tablet(s) Oral at bedtime    MEDICATIONS  (PRN):                            8.6    9.50  )-----------( 209      ( 18 Nov 2017 08:34 )             28.3       11-18    136  |  100  |  18  ----------------------------<  82  4.4   |  25  |  0.82    Ca    8.2<L>      18 Nov 2017 08:41        Physical Exam:  Gen: Laying in bed, NAD, alert and oriented.   Resp: Unlabored breathing  Abdominal: Softly distended, mild tenderness in the right upper and lower quadrants

## 2017-11-19 NOTE — PROGRESS NOTE ADULT - ASSESSMENT
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    Plan:  - Patient scheduled for a laparoscopic, possible open right colectomy for Monday 11/20 at 2:30PM  - Will pre-op and consent for Monday  - Bowel prep today  - Appreciate cardiac risk stratification      Patient seen and discussed with Dr. Javier Carranza, PGY-1  [facility] [phone]

## 2017-11-19 NOTE — PROGRESS NOTE ADULT - SUBJECTIVE AND OBJECTIVE BOX
Patient is a 89y old  Male who presents with a chief complaint of melena, abdominal pain (09 Nov 2017 23:38)      SUBJECTIVE / OVERNIGHT EVENTS:  Sitting up on the chair.  Upset.  Doesn't want Golytely. Explained at length he needs it for bowel prep for planned surgery.    no cp, no sob, no n/v/d. no abdominal pain.  no headache, no dizziness.       Vital Signs Last 24 Hrs  T(C): 36.4 (19 Nov 2017 11:02), Max: 36.7 (18 Nov 2017 20:22)  T(F): 97.6 (19 Nov 2017 11:02), Max: 98 (18 Nov 2017 20:22)  HR: 63 (19 Nov 2017 11:02) (55 - 67)  BP: 92/59 (19 Nov 2017 11:02) (92/59 - 147/55)  BP(mean): --  RR: 18 (19 Nov 2017 11:02) (18 - 18)  SpO2: 96% (19 Nov 2017 11:02) (96% - 98%)  I&O's Summary    18 Nov 2017 07:01  -  19 Nov 2017 07:00  --------------------------------------------------------  IN: 860 mL / OUT: 1050 mL / NET: -190 mL    19 Nov 2017 07:01  -  19 Nov 2017 12:10  --------------------------------------------------------  IN: 240 mL / OUT: 0 mL / NET: 240 mL        PHYSICAL EXAM:  GENERAL: NAD, Comfortable, visibly upset. angry.   HEAD:  Atraumatic, Normocephalic  EYES: EOMI, PERRLA, conjunctiva and sclera clear  NECK: Supple, No JVD  CHEST/LUNG: Clear to auscultation bilaterally; No wheeze  HEART: Regular rate and rhythm; No murmurs, rubs, or gallops  ABDOMEN: Soft, Nontender, Nondistended; Bowel sounds present  Neuro: AAO x 3, no focal deficit, 5/5 b/l extremities  EXTREMITIES:  2+ Peripheral Pulses, No clubbing, cyanosis, or edema  SKIN: No rashes or lesions    LABS:                        9.0    8.13  )-----------( 269      ( 19 Nov 2017 08:53 )             29.3     11-19    137  |  100  |  19  ----------------------------<  80  4.2   |  25  |  1.01    Ca    8.6      19 Nov 2017 08:58        CAPILLARY BLOOD GLUCOSE                RADIOLOGY & ADDITIONAL TESTS:    Imaging Personally Reviewed:  [x] YES  [ ] NO    Consultant(s) Notes Reviewed:  [x] YES  [ ] NO      MEDICATIONS  (STANDING):  ascorbic acid 500 milliGRAM(s) Oral daily  atorvastatin 40 milliGRAM(s) Oral at bedtime  carvedilol 6.25 milliGRAM(s) Oral every 12 hours  ferrous    sulfate 325 milliGRAM(s) Oral two times a day with meals  furosemide    Tablet 20 milliGRAM(s) Oral every other day  metroNIDAZOLE    Tablet 500 milliGRAM(s) Oral once  metroNIDAZOLE    Tablet 500 milliGRAM(s) Oral once  metroNIDAZOLE    Tablet 500 milliGRAM(s) Oral once  multivitamin 1 Tablet(s) Oral daily  neomycin 1000 milliGRAM(s) Oral once  neomycin 1000 milliGRAM(s) Oral once  neomycin 1000 milliGRAM(s) Oral once  polyethylene glycol/electrolyte Solution. 4000 milliLiter(s) Oral once  senna 2 Tablet(s) Oral at bedtime    MEDICATIONS  (PRN):      Care Discussed with Consultants/Other Providers [x] YES  [ ] NO    HEALTH ISSUES - PROBLEM Dx:  Preoperative cardiovascular examination: Preoperative cardiovascular examination  Colon adenocarcinoma: Colon adenocarcinoma  Nutrition, metabolism, and development symptoms: Nutrition, metabolism, and development symptoms  Prophylactic measure: Prophylactic measure  Essential hypertension: Essential hypertension  HLD (hyperlipidemia): HLD (hyperlipidemia)  CAD (coronary artery disease): CAD (coronary artery disease)  Anemia: Anemia  Epigastric abdominal pain: Epigastric abdominal pain  Melena: Melena

## 2017-11-20 ENCOUNTER — RESULT REVIEW (OUTPATIENT)
Age: 82
End: 2017-11-20

## 2017-11-20 ENCOUNTER — APPOINTMENT (OUTPATIENT)
Dept: SURGICAL ONCOLOGY | Facility: HOSPITAL | Age: 82
End: 2017-11-20
Payer: MEDICARE

## 2017-11-20 ENCOUNTER — TRANSCRIPTION ENCOUNTER (OUTPATIENT)
Age: 82
End: 2017-11-20

## 2017-11-20 DIAGNOSIS — C18.9 MALIGNANT NEOPLASM OF COLON, UNSPECIFIED: ICD-10-CM

## 2017-11-20 PROBLEM — I25.10 ATHEROSCLEROTIC HEART DISEASE OF NATIVE CORONARY ARTERY WITHOUT ANGINA PECTORIS: Chronic | Status: ACTIVE | Noted: 2017-11-09

## 2017-11-20 PROBLEM — E78.5 HYPERLIPIDEMIA, UNSPECIFIED: Chronic | Status: ACTIVE | Noted: 2017-11-09

## 2017-11-20 PROBLEM — I10 ESSENTIAL (PRIMARY) HYPERTENSION: Chronic | Status: ACTIVE | Noted: 2017-11-09

## 2017-11-20 LAB
ANION GAP SERPL CALC-SCNC: 12 MMOL/L — SIGNIFICANT CHANGE UP (ref 5–17)
ANION GAP SERPL CALC-SCNC: 13 MMOL/L — SIGNIFICANT CHANGE UP (ref 5–17)
ANION GAP SERPL CALC-SCNC: 14 MMOL/L — SIGNIFICANT CHANGE UP (ref 5–17)
BUN SERPL-MCNC: 13 MG/DL — SIGNIFICANT CHANGE UP (ref 7–23)
BUN SERPL-MCNC: 15 MG/DL — SIGNIFICANT CHANGE UP (ref 7–23)
BUN SERPL-MCNC: 19 MG/DL — SIGNIFICANT CHANGE UP (ref 7–23)
CALCIUM SERPL-MCNC: 7.5 MG/DL — LOW (ref 8.4–10.5)
CALCIUM SERPL-MCNC: 7.8 MG/DL — LOW (ref 8.4–10.5)
CALCIUM SERPL-MCNC: 9.1 MG/DL — SIGNIFICANT CHANGE UP (ref 8.4–10.5)
CHLORIDE SERPL-SCNC: 102 MMOL/L — SIGNIFICANT CHANGE UP (ref 96–108)
CHLORIDE SERPL-SCNC: 105 MMOL/L — SIGNIFICANT CHANGE UP (ref 96–108)
CHLORIDE SERPL-SCNC: 107 MMOL/L — SIGNIFICANT CHANGE UP (ref 96–108)
CO2 SERPL-SCNC: 20 MMOL/L — LOW (ref 22–31)
CO2 SERPL-SCNC: 23 MMOL/L — SIGNIFICANT CHANGE UP (ref 22–31)
CO2 SERPL-SCNC: 24 MMOL/L — SIGNIFICANT CHANGE UP (ref 22–31)
CREAT SERPL-MCNC: 0.88 MG/DL — SIGNIFICANT CHANGE UP (ref 0.5–1.3)
CREAT SERPL-MCNC: 0.89 MG/DL — SIGNIFICANT CHANGE UP (ref 0.5–1.3)
CREAT SERPL-MCNC: 1.26 MG/DL — SIGNIFICANT CHANGE UP (ref 0.5–1.3)
GLUCOSE SERPL-MCNC: 106 MG/DL — HIGH (ref 70–99)
GLUCOSE SERPL-MCNC: 120 MG/DL — HIGH (ref 70–99)
GLUCOSE SERPL-MCNC: 86 MG/DL — SIGNIFICANT CHANGE UP (ref 70–99)
HCT VFR BLD CALC: 27 % — LOW (ref 39–50)
HCT VFR BLD CALC: 31.9 % — LOW (ref 39–50)
HCT VFR BLD CALC: 33.9 % — LOW (ref 39–50)
HGB BLD-MCNC: 10.4 G/DL — LOW (ref 13–17)
HGB BLD-MCNC: 10.5 G/DL — LOW (ref 13–17)
HGB BLD-MCNC: 8.3 G/DL — LOW (ref 13–17)
MAGNESIUM SERPL-MCNC: 2 MG/DL — SIGNIFICANT CHANGE UP (ref 1.6–2.6)
MAGNESIUM SERPL-MCNC: 2.1 MG/DL — SIGNIFICANT CHANGE UP (ref 1.6–2.6)
MAGNESIUM SERPL-MCNC: 2.2 MG/DL — SIGNIFICANT CHANGE UP (ref 1.6–2.6)
MCHC RBC-ENTMCNC: 26.4 PG — LOW (ref 27–34)
MCHC RBC-ENTMCNC: 26.9 PG — LOW (ref 27–34)
MCHC RBC-ENTMCNC: 29.2 PG — SIGNIFICANT CHANGE UP (ref 27–34)
MCHC RBC-ENTMCNC: 30.7 GM/DL — LOW (ref 32–36)
MCHC RBC-ENTMCNC: 31 GM/DL — LOW (ref 32–36)
MCHC RBC-ENTMCNC: 32.5 GM/DL — SIGNIFICANT CHANGE UP (ref 32–36)
MCV RBC AUTO: 86 FL — SIGNIFICANT CHANGE UP (ref 80–100)
MCV RBC AUTO: 86.7 FL — SIGNIFICANT CHANGE UP (ref 80–100)
MCV RBC AUTO: 90.1 FL — SIGNIFICANT CHANGE UP (ref 80–100)
PHOSPHATE SERPL-MCNC: 2.8 MG/DL — SIGNIFICANT CHANGE UP (ref 2.5–4.5)
PHOSPHATE SERPL-MCNC: 2.8 MG/DL — SIGNIFICANT CHANGE UP (ref 2.5–4.5)
PHOSPHATE SERPL-MCNC: 3.2 MG/DL — SIGNIFICANT CHANGE UP (ref 2.5–4.5)
PLATELET # BLD AUTO: 222 K/UL — SIGNIFICANT CHANGE UP (ref 150–400)
PLATELET # BLD AUTO: 232 K/UL — SIGNIFICANT CHANGE UP (ref 150–400)
PLATELET # BLD AUTO: 315 K/UL — SIGNIFICANT CHANGE UP (ref 150–400)
POTASSIUM SERPL-MCNC: 3.9 MMOL/L — SIGNIFICANT CHANGE UP (ref 3.5–5.3)
POTASSIUM SERPL-MCNC: 4 MMOL/L — SIGNIFICANT CHANGE UP (ref 3.5–5.3)
POTASSIUM SERPL-MCNC: 4.4 MMOL/L — SIGNIFICANT CHANGE UP (ref 3.5–5.3)
POTASSIUM SERPL-SCNC: 3.9 MMOL/L — SIGNIFICANT CHANGE UP (ref 3.5–5.3)
POTASSIUM SERPL-SCNC: 4 MMOL/L — SIGNIFICANT CHANGE UP (ref 3.5–5.3)
POTASSIUM SERPL-SCNC: 4.4 MMOL/L — SIGNIFICANT CHANGE UP (ref 3.5–5.3)
RBC # BLD: 3.14 M/UL — LOW (ref 4.2–5.8)
RBC # BLD: 3.54 M/UL — LOW (ref 4.2–5.8)
RBC # BLD: 3.91 M/UL — LOW (ref 4.2–5.8)
RBC # FLD: 15.3 % — HIGH (ref 10.3–14.5)
RBC # FLD: 16.8 % — HIGH (ref 10.3–14.5)
RBC # FLD: 17.1 % — HIGH (ref 10.3–14.5)
SODIUM SERPL-SCNC: 138 MMOL/L — SIGNIFICANT CHANGE UP (ref 135–145)
SODIUM SERPL-SCNC: 141 MMOL/L — SIGNIFICANT CHANGE UP (ref 135–145)
SODIUM SERPL-SCNC: 141 MMOL/L — SIGNIFICANT CHANGE UP (ref 135–145)
WBC # BLD: 14.76 K/UL — HIGH (ref 3.8–10.5)
WBC # BLD: 16.1 K/UL — HIGH (ref 3.8–10.5)
WBC # BLD: 7.7 K/UL — SIGNIFICANT CHANGE UP (ref 3.8–10.5)
WBC # FLD AUTO: 14.76 K/UL — HIGH (ref 3.8–10.5)
WBC # FLD AUTO: 16.1 K/UL — HIGH (ref 3.8–10.5)
WBC # FLD AUTO: 7.7 K/UL — SIGNIFICANT CHANGE UP (ref 3.8–10.5)

## 2017-11-20 PROCEDURE — 49204: CPT

## 2017-11-20 PROCEDURE — 44204 LAPARO PARTIAL COLECTOMY: CPT | Mod: 82

## 2017-11-20 PROCEDURE — 51702 INSERT TEMP BLADDER CATH: CPT | Mod: 59

## 2017-11-20 PROCEDURE — 44204 LAPARO PARTIAL COLECTOMY: CPT | Mod: 59

## 2017-11-20 PROCEDURE — 38747 REMOVE ABDOMINAL LYMPH NODES: CPT | Mod: 59

## 2017-11-20 PROCEDURE — 49204: CPT | Mod: 82,59

## 2017-11-20 PROCEDURE — 99024 POSTOP FOLLOW-UP VISIT: CPT

## 2017-11-20 RX ORDER — HYDROMORPHONE HYDROCHLORIDE 2 MG/ML
0.5 INJECTION INTRAMUSCULAR; INTRAVENOUS; SUBCUTANEOUS EVERY 4 HOURS
Qty: 0 | Refills: 0 | Status: DISCONTINUED | OUTPATIENT
Start: 2017-11-20 | End: 2017-11-22

## 2017-11-20 RX ORDER — HYDROMORPHONE HYDROCHLORIDE 2 MG/ML
0.2 INJECTION INTRAMUSCULAR; INTRAVENOUS; SUBCUTANEOUS
Qty: 0 | Refills: 0 | Status: DISCONTINUED | OUTPATIENT
Start: 2017-11-20 | End: 2017-11-21

## 2017-11-20 RX ORDER — ACETAMINOPHEN 500 MG
1000 TABLET ORAL ONCE
Qty: 0 | Refills: 0 | Status: COMPLETED | OUTPATIENT
Start: 2017-11-21 | End: 2017-11-21

## 2017-11-20 RX ORDER — SODIUM CHLORIDE 9 MG/ML
500 INJECTION INTRAMUSCULAR; INTRAVENOUS; SUBCUTANEOUS ONCE
Qty: 0 | Refills: 0 | Status: COMPLETED | OUTPATIENT
Start: 2017-11-20 | End: 2017-11-20

## 2017-11-20 RX ORDER — HEPARIN SODIUM 5000 [USP'U]/ML
5000 INJECTION INTRAVENOUS; SUBCUTANEOUS EVERY 8 HOURS
Qty: 0 | Refills: 0 | Status: DISCONTINUED | OUTPATIENT
Start: 2017-11-20 | End: 2017-11-21

## 2017-11-20 RX ORDER — SODIUM CHLORIDE 9 MG/ML
1000 INJECTION, SOLUTION INTRAVENOUS
Qty: 0 | Refills: 0 | Status: DISCONTINUED | OUTPATIENT
Start: 2017-11-20 | End: 2017-11-21

## 2017-11-20 RX ORDER — HYDROMORPHONE HYDROCHLORIDE 2 MG/ML
1 INJECTION INTRAMUSCULAR; INTRAVENOUS; SUBCUTANEOUS EVERY 4 HOURS
Qty: 0 | Refills: 0 | Status: DISCONTINUED | OUTPATIENT
Start: 2017-11-20 | End: 2017-11-22

## 2017-11-20 RX ORDER — HYDRALAZINE HCL 50 MG
10 TABLET ORAL ONCE
Qty: 0 | Refills: 0 | Status: COMPLETED | OUTPATIENT
Start: 2017-11-20 | End: 2017-11-20

## 2017-11-20 RX ORDER — ONDANSETRON 8 MG/1
4 TABLET, FILM COATED ORAL ONCE
Qty: 0 | Refills: 0 | Status: DISCONTINUED | OUTPATIENT
Start: 2017-11-20 | End: 2017-11-21

## 2017-11-20 RX ADMIN — HEPARIN SODIUM 5000 UNIT(S): 5000 INJECTION INTRAVENOUS; SUBCUTANEOUS at 21:09

## 2017-11-20 RX ADMIN — SODIUM CHLORIDE 100 MILLILITER(S): 9 INJECTION, SOLUTION INTRAVENOUS at 21:10

## 2017-11-20 RX ADMIN — SODIUM CHLORIDE 100 MILLILITER(S): 9 INJECTION INTRAMUSCULAR; INTRAVENOUS; SUBCUTANEOUS at 09:50

## 2017-11-20 RX ADMIN — Medication 10 MILLIGRAM(S): at 20:45

## 2017-11-20 RX ADMIN — SODIUM CHLORIDE 1000 MILLILITER(S): 9 INJECTION INTRAMUSCULAR; INTRAVENOUS; SUBCUTANEOUS at 01:27

## 2017-11-20 RX ADMIN — CARVEDILOL PHOSPHATE 6.25 MILLIGRAM(S): 80 CAPSULE, EXTENDED RELEASE ORAL at 06:53

## 2017-11-20 RX ADMIN — SODIUM CHLORIDE 100 MILLILITER(S): 9 INJECTION INTRAMUSCULAR; INTRAVENOUS; SUBCUTANEOUS at 01:27

## 2017-11-20 NOTE — BRIEF OPERATIVE NOTE - OPERATION/FINDINGS
Laparoscopic right hemicolectomy. Large irregular mass visible in ascending colon, no apparent metastatic disease. Hepatic flexure mobilized, ileocecal pedicle identified, divided with stapler. Lateral peritoneal attachment of right colon incised and dissection carried cephalad along white line of Toldt. Right branch of the middle colic identified and divided with stapler. At this point umbilical port incision extended and devascularized segment of terminal ileum and ascending colon delivered. Mesentery divided using Ligasure and middle colic identified and divided with stapler. Colon and ileum diveded with stapler, brought together and apposed with silk stay-suture, and ileocolic anastomosis performed. Fascia closed with 0 Maxon. Skin closed with interrupted 4-0 Monocryl.

## 2017-11-20 NOTE — PROGRESS NOTE ADULT - ATTENDING COMMENTS
D/w pt plan for lap poss open right colectomy.    Discussed r/b/a post op expectations poss complications.  Pt expresses wishes to avoid an ostomy at all costs, including in the event of an anastomotic leak.    Pt and his 3 children express understanding  and agree to proceed.

## 2017-11-20 NOTE — PROGRESS NOTE ADULT - ASSESSMENT
89 year old man with melena found to have tumor in the ascending colon. Biopsy shows invasive adenocarcinoma. Staging studies shows isolated lesion. For surgical resection today.

## 2017-11-20 NOTE — PROGRESS NOTE ADULT - SUBJECTIVE AND OBJECTIVE BOX
GENERAL SURGERY PROGRESS NOTE:    ===================================  Surgical Oncology ([facility]) Pager [phone]  ===================================    Subjective:  Pt tolerated bowel prep y/d. Has remained NPO. Ready for surgery today.      Objective:    PE:  Gen: NAD  Resp: airway patent, respirations unlabored, no increased WoB  CVS: RRR  Abd: soft, ND, NT, no rebound or guarding  Ext: no edema, WWP  Neuro: AAOx3, no focal deficits    Vital Signs Last 24 Hrs  T(C): 36.5 (20 Nov 2017 06:51), Max: 36.5 (20 Nov 2017 06:51)  T(F): 97.7 (20 Nov 2017 06:51), Max: 97.7 (20 Nov 2017 06:51)  HR: 78 (20 Nov 2017 06:51) (63 - 85)  BP: 127/79 (20 Nov 2017 06:51) (92/59 - 139/83)  BP(mean): --  RR: 18 (20 Nov 2017 06:51) (18 - 18)  SpO2: 99% (20 Nov 2017 06:51) (96% - 100%)    I&O's Detail    19 Nov 2017 07:01  -  20 Nov 2017 07:00  --------------------------------------------------------  IN:    Oral Fluid: 560 mL    Sodium Chloride 0.9% IV Bolus: 500 mL    sodium chloride 0.9%.: 700 mL  Total IN: 1760 mL    OUT:  Total OUT: 0 mL    Total NET: 1760 mL          Daily     Daily     MEDICATIONS  (STANDING):  ascorbic acid 500 milliGRAM(s) Oral daily  atorvastatin 40 milliGRAM(s) Oral at bedtime  carvedilol 6.25 milliGRAM(s) Oral every 12 hours  sodium chloride 0.9%. 1000 milliLiter(s) (100 mL/Hr) IV Continuous <Continuous>    MEDICATIONS  (PRN):      LABS:                        8.3    7.70  )-----------( 232      ( 20 Nov 2017 08:29 )             27.0     11-19    138  |  102  |  19  ----------------------------<  106<H>  4.4   |  24  |  1.26    Ca    9.1      19 Nov 2017 23:29  Phos  2.8     11-19  Mg     2.2     11-19      PT/INR - ( 19 Nov 2017 23:29 )   PT: 12.6 sec;   INR: 1.11 ratio         PTT - ( 19 Nov 2017 23:29 )  PTT:30.2 sec      RADIOLOGY & ADDITIONAL STUDIES:

## 2017-11-20 NOTE — PROGRESS NOTE ADULT - PROBLEM SELECTOR PLAN 2
CT scans shows isolated disease  - Surgical oncology consult appreciated.  - For surgical resection today  - Will follow up pathology and discuss if chemotherapy is needed post-operatively with family.  - Will follow    Henry Kothari MD  [facility] - A Division of [address] [address]  (T) [phone]  (F) [phone]

## 2017-11-20 NOTE — PROGRESS NOTE ADULT - ASSESSMENT
89yM w/ invasive adeno CA of R colon    - OR for Lap R hemicolectomy possible open today at 2:30PM  - F/U repeat BMP as Cr had slight increase after bowel prep  - NPO/IVF  - Consent signed and in chart  - Please page [phone] w/ any questions    LESLEY Dominique PGY-2

## 2017-11-20 NOTE — PROGRESS NOTE ADULT - PROBLEM SELECTOR PLAN 1
-- GI eval appreciated. hgb remains relatively stable.    -- s/p egd and Colonoscopy 11/13 which shows ascending colon mass,   -- Biopsy shows adenocarcinoma  -- CT abdomen/chest shows no evidence of metastasis  -- Oncology Dr. Kothari consult appreciated  -- Surg-onc Dr Flores consulted  -- Cardiology c/s-for preop (Dr Rdz)  -- Acceptable risk for colectomy with the hope of cure.  -- surg/onc f/u appreciated.   -- Tentative hemicolectomy today at 2:30 pm.   -- s/p Bowel prep and pre-surgery prep.

## 2017-11-20 NOTE — BRIEF OPERATIVE NOTE - PROCEDURE
<<-----Click on this checkbox to enter Procedure Right hemicolectomy with anastomosis of ileum to colon  11/20/2017    Active  SSHTERENTA

## 2017-11-20 NOTE — PROGRESS NOTE ADULT - SUBJECTIVE AND OBJECTIVE BOX
Patient is a 89y old  Male who presents with a chief complaint of melena, abdominal pain (09 Nov 2017 23:38)      SUBJECTIVE / OVERNIGHT EVENTS:  Pt seen and examined at bedside. Feels well.   No chest pain, no shortness of breath.   No overnight event.   No N/V/D. No abdominal pain. s/p bowel prep.         Vital Signs Last 24 Hrs  T(C): 36.2 (20 Nov 2017 11:14), Max: 36.7 (20 Nov 2017 01:29)  T(F): 97.1 (20 Nov 2017 11:14), Max: 98.1 (20 Nov 2017 01:29)  HR: 65 (20 Nov 2017 11:14) (65 - 85)  BP: 149/84 (20 Nov 2017 11:14) (124/59 - 171/87)  BP(mean): --  RR: 18 (20 Nov 2017 11:14) (18 - 18)  SpO2: 96% (20 Nov 2017 11:14) (96% - 100%)  I&O's Summary    19 Nov 2017 07:01  -  20 Nov 2017 07:00  --------------------------------------------------------  IN: 1760 mL / OUT: 0 mL / NET: 1760 mL        PHYSICAL EXAM:  GENERAL: NAD, Comfortable  HEAD:  Atraumatic, Normocephalic  EYES: EOMI, PERRLA, conjunctiva and sclera clear  NECK: Supple, No JVD  CHEST/LUNG: Clear to auscultation bilaterally; No wheeze  HEART: Regular rate and rhythm; No murmurs, rubs, or gallops  ABDOMEN: Soft, Nontender, Nondistended; Bowel sounds present  Neuro: AAO x 3, no focal deficit, 5/5 b/l extremities  EXTREMITIES:  2+ Peripheral Pulses, No clubbing, cyanosis, or edema  SKIN: No rashes or lesions    LABS:                        8.3    7.70  )-----------( 232      ( 20 Nov 2017 08:29 )             27.0     11-20    141  |  105  |  15  ----------------------------<  86  3.9   |  23  |  0.88    Ca    7.8<L>      20 Nov 2017 08:29  Phos  2.8     11-20  Mg     2.0     11-20      PT/INR - ( 19 Nov 2017 23:29 )   PT: 12.6 sec;   INR: 1.11 ratio         PTT - ( 19 Nov 2017 23:29 )  PTT:30.2 sec  CAPILLARY BLOOD GLUCOSE                RADIOLOGY & ADDITIONAL TESTS:    Imaging Personally Reviewed:  [x] YES  [ ] NO    Consultant(s) Notes Reviewed:  [x] YES  [ ] NO      MEDICATIONS  (STANDING):  ascorbic acid 500 milliGRAM(s) Oral daily  atorvastatin 40 milliGRAM(s) Oral at bedtime  carvedilol 6.25 milliGRAM(s) Oral every 12 hours  sodium chloride 0.9%. 1000 milliLiter(s) (100 mL/Hr) IV Continuous <Continuous>    MEDICATIONS  (PRN):      Care Discussed with Consultants/Other Providers [x] YES  [ ] NO    HEALTH ISSUES - PROBLEM Dx:  Preoperative cardiovascular examination: Preoperative cardiovascular examination  Colon adenocarcinoma: Colon adenocarcinoma  Nutrition, metabolism, and development symptoms: Nutrition, metabolism, and development symptoms  Prophylactic measure: Prophylactic measure  Essential hypertension: Essential hypertension  HLD (hyperlipidemia): HLD (hyperlipidemia)  CAD (coronary artery disease): CAD (coronary artery disease)  Anemia: Anemia  Epigastric abdominal pain: Epigastric abdominal pain  Melena: Melena

## 2017-11-20 NOTE — PROGRESS NOTE ADULT - SUBJECTIVE AND OBJECTIVE BOX
[facility] - Renee Olivia / Rafael / Taye - [phone]  Primary Outpatient Hematologist/Oncologist: None    Subjective  Patient seen this morning. Feeling well. For surgery today    ROS:  General:  (-)Pain, (-)Decrease appeite, (-)Fevers, (-)Chills, (-)Weight Loss  Eyes: (-)Blurry Vision, (-)Double Vision, (-)Vision Loss  ENT: (-)Sinus Congestion, (-)Decreased Hearing, (-)Nosebleeds, (-)Sore Throat  Cardiac: (-)Chest Pain, (-)Palpitations, (-)Shortness of breathe on exertion  Respiratory: (-)Cough, (-)hemoptysis, (-)Shortness of breathe  GI: (-)Nausea, (-)Vomiting, (-)Diarrhea, (-)Constipation, (+)Melena, (-)BRBPR  : (-)Hematuria, (-)Dysuria, (-)Polyuia  MSK: (-)Back pain, (-)Joint pain, (-)Stiffness  Dermatology: (-)Rash, (-)Itching  Neurology:  (-)Numbness, (-)Tingling, (-)Difficulty Walking, (-)Tremors, (-)Weakness  Psych: (-)Anxiety, (-)Depression, (-)Memory Loss  Hematology:  (-)Easy Bruising, (-)Easy Bleeding, (-)Night Sweats.     MEDICATIONS  (STANDING):  ascorbic acid 500 milliGRAM(s) Oral daily  atorvastatin 40 milliGRAM(s) Oral at bedtime  carvedilol 6.25 milliGRAM(s) Oral every 12 hours  ferrous    sulfate 325 milliGRAM(s) Oral two times a day with meals  furosemide    Tablet 20 milliGRAM(s) Oral every other day  multivitamin 1 Tablet(s) Oral daily    Allergies  No Known Allergies    Vital Signs Last 24 Hrs  T(C): 36.5 (20 Nov 2017 06:51), Max: 36.5 (20 Nov 2017 06:51)  T(F): 97.7 (20 Nov 2017 06:51), Max: 97.7 (20 Nov 2017 06:51)  HR: 78 (20 Nov 2017 06:51) (63 - 85)  BP: 127/79 (20 Nov 2017 06:51) (92/59 - 139/83)  RR: 18 (20 Nov 2017 06:51) (18 - 18)  SpO2: 99% (20 Nov 2017 06:51) (96% - 100%)    Physical Exam:  General: AAO x 3. NAD. Sitting in chair comfortably. Hard of hearing.   HEENT: clear oropharynx, anicteric sclera, pink conjunctivae, EOMi. PERRLA  Cardiac: S1, S2 present. No audible murmurs. RRR  Lungs: CTA B/L.   Abdomen: Soft, Non-Tender, Non-Distended. No palpable hepatosplenomegaly  Extremities: 2+ pulses. No edema  Skin: No Rashes. No petechiae  Neuro: No focal motor or sensory deficits.     Labs:                        8.3    7.70  )-----------( 232      ( 20 Nov 2017 08:29 )             27.0   11-19    138  |  102  |  19  ----------------------------<  106<H>  4.4   |  24  |  1.26    Ca    9.1      19 Nov 2017 23:29  Phos  2.8     11-19  Mg     2.2     11-19        Radiology:  Colonoscopy  Impression:          - Likely malignant partially obstructingtumor in the ascending colon.                        Biopsied. Tattooed.                       - Diverticulosis in the sigmoid colon, in the descending colon, in the                        transverse colon and in the ascending colon.       - The examined portion of the ileum was normal.                       - Internal hemorrhoids.  Recommendation:      - Return patient to [facility] for [facility] care.                       - Full liquid diet today.                       - F/U pathology                       - Continue to hold ASA/Plavix if possible                       - CT chest/ abdomen / pelvis for staging purposes    CT Chest/Abdomen/Pelvis  - Isolated Lesion. No metastatic lesions seen.     Pathology:  Surgical Pathology Report:   ACCESSION No:  [id number]  MICHELLE HERRERA                     1  Surgical Final Report  Final Diagnosis  1     Ascending colon mass biopsy:  - Invasive adenocarcinoma.    Note: Dr. Hayes has reviewed this case and concurred the diagnosis.    Verified by: Kristen Dasilva M.D.  (Electronic Signature)  Reported on: 11/14/17 17:57 EST,[address] [place] [address]

## 2017-11-21 ENCOUNTER — TRANSCRIPTION ENCOUNTER (OUTPATIENT)
Age: 82
End: 2017-11-21

## 2017-11-21 LAB
ANION GAP SERPL CALC-SCNC: 14 MMOL/L — SIGNIFICANT CHANGE UP (ref 5–17)
BUN SERPL-MCNC: 13 MG/DL — SIGNIFICANT CHANGE UP (ref 7–23)
CA-I BLD-SCNC: 1.14 MMOL/L — SIGNIFICANT CHANGE UP (ref 1.12–1.3)
CALCIUM SERPL-MCNC: 7.8 MG/DL — LOW (ref 8.4–10.5)
CHLORIDE SERPL-SCNC: 104 MMOL/L — SIGNIFICANT CHANGE UP (ref 96–108)
CO2 SERPL-SCNC: 22 MMOL/L — SIGNIFICANT CHANGE UP (ref 22–31)
CREAT SERPL-MCNC: 0.88 MG/DL — SIGNIFICANT CHANGE UP (ref 0.5–1.3)
GLUCOSE SERPL-MCNC: 85 MG/DL — SIGNIFICANT CHANGE UP (ref 70–99)
HCT VFR BLD CALC: 33 % — LOW (ref 39–50)
HGB BLD-MCNC: 10.4 G/DL — LOW (ref 13–17)
MAGNESIUM SERPL-MCNC: 2 MG/DL — SIGNIFICANT CHANGE UP (ref 1.6–2.6)
MCHC RBC-ENTMCNC: 27.8 PG — SIGNIFICANT CHANGE UP (ref 27–34)
MCHC RBC-ENTMCNC: 31.5 GM/DL — LOW (ref 32–36)
MCV RBC AUTO: 88.2 FL — SIGNIFICANT CHANGE UP (ref 80–100)
PHOSPHATE SERPL-MCNC: 3.3 MG/DL — SIGNIFICANT CHANGE UP (ref 2.5–4.5)
PLATELET # BLD AUTO: 240 K/UL — SIGNIFICANT CHANGE UP (ref 150–400)
POTASSIUM SERPL-MCNC: 4.6 MMOL/L — SIGNIFICANT CHANGE UP (ref 3.5–5.3)
POTASSIUM SERPL-SCNC: 4.6 MMOL/L — SIGNIFICANT CHANGE UP (ref 3.5–5.3)
RBC # BLD: 3.74 M/UL — LOW (ref 4.2–5.8)
RBC # FLD: 16.5 % — HIGH (ref 10.3–14.5)
SODIUM SERPL-SCNC: 140 MMOL/L — SIGNIFICANT CHANGE UP (ref 135–145)
WBC # BLD: 13.92 K/UL — HIGH (ref 3.8–10.5)
WBC # FLD AUTO: 13.92 K/UL — HIGH (ref 3.8–10.5)

## 2017-11-21 RX ORDER — ATORVASTATIN CALCIUM 80 MG/1
40 TABLET, FILM COATED ORAL AT BEDTIME
Qty: 0 | Refills: 0 | Status: DISCONTINUED | OUTPATIENT
Start: 2017-11-21 | End: 2017-11-28

## 2017-11-21 RX ORDER — DEXTROSE MONOHYDRATE, SODIUM CHLORIDE, AND POTASSIUM CHLORIDE 50; .745; 4.5 G/1000ML; G/1000ML; G/1000ML
1000 INJECTION, SOLUTION INTRAVENOUS
Qty: 0 | Refills: 0 | Status: DISCONTINUED | OUTPATIENT
Start: 2017-11-21 | End: 2017-11-22

## 2017-11-21 RX ORDER — MECLIZINE HCL 12.5 MG
12.5 TABLET ORAL EVERY 8 HOURS
Qty: 0 | Refills: 0 | Status: DISCONTINUED | OUTPATIENT
Start: 2017-11-21 | End: 2017-11-22

## 2017-11-21 RX ORDER — FUROSEMIDE 40 MG
10 TABLET ORAL ONCE
Qty: 0 | Refills: 0 | Status: COMPLETED | OUTPATIENT
Start: 2017-11-21 | End: 2017-11-21

## 2017-11-21 RX ORDER — PANTOPRAZOLE SODIUM 20 MG/1
40 TABLET, DELAYED RELEASE ORAL DAILY
Qty: 0 | Refills: 0 | Status: DISCONTINUED | OUTPATIENT
Start: 2017-11-21 | End: 2017-11-21

## 2017-11-21 RX ORDER — ISOSORBIDE MONONITRATE 60 MG/1
30 TABLET, EXTENDED RELEASE ORAL DAILY
Qty: 0 | Refills: 0 | Status: DISCONTINUED | OUTPATIENT
Start: 2017-11-21 | End: 2017-11-28

## 2017-11-21 RX ORDER — ENOXAPARIN SODIUM 100 MG/ML
40 INJECTION SUBCUTANEOUS DAILY
Qty: 0 | Refills: 0 | Status: DISCONTINUED | OUTPATIENT
Start: 2017-11-21 | End: 2017-11-28

## 2017-11-21 RX ORDER — CARVEDILOL PHOSPHATE 80 MG/1
6.25 CAPSULE, EXTENDED RELEASE ORAL EVERY 12 HOURS
Qty: 0 | Refills: 0 | Status: DISCONTINUED | OUTPATIENT
Start: 2017-11-21 | End: 2017-11-28

## 2017-11-21 RX ORDER — ASCORBIC ACID 60 MG
500 TABLET,CHEWABLE ORAL DAILY
Qty: 0 | Refills: 0 | Status: DISCONTINUED | OUTPATIENT
Start: 2017-11-21 | End: 2017-11-28

## 2017-11-21 RX ORDER — ACETAMINOPHEN 500 MG
1000 TABLET ORAL ONCE
Qty: 0 | Refills: 0 | Status: COMPLETED | OUTPATIENT
Start: 2017-11-21 | End: 2017-11-21

## 2017-11-21 RX ORDER — ACETAMINOPHEN 500 MG
1000 TABLET ORAL ONCE
Qty: 0 | Refills: 0 | Status: COMPLETED | OUTPATIENT
Start: 2017-11-22 | End: 2017-11-22

## 2017-11-21 RX ADMIN — ATORVASTATIN CALCIUM 40 MILLIGRAM(S): 80 TABLET, FILM COATED ORAL at 21:15

## 2017-11-21 RX ADMIN — Medication 1000 MILLIGRAM(S): at 14:09

## 2017-11-21 RX ADMIN — HYDROMORPHONE HYDROCHLORIDE 0.5 MILLIGRAM(S): 2 INJECTION INTRAMUSCULAR; INTRAVENOUS; SUBCUTANEOUS at 10:18

## 2017-11-21 RX ADMIN — HYDROMORPHONE HYDROCHLORIDE 0.5 MILLIGRAM(S): 2 INJECTION INTRAMUSCULAR; INTRAVENOUS; SUBCUTANEOUS at 02:31

## 2017-11-21 RX ADMIN — Medication 400 MILLIGRAM(S): at 18:23

## 2017-11-21 RX ADMIN — ENOXAPARIN SODIUM 40 MILLIGRAM(S): 100 INJECTION SUBCUTANEOUS at 14:11

## 2017-11-21 RX ADMIN — Medication 1 TABLET(S): at 11:45

## 2017-11-21 RX ADMIN — Medication 1000 MILLIGRAM(S): at 06:12

## 2017-11-21 RX ADMIN — HYDROMORPHONE HYDROCHLORIDE 0.5 MILLIGRAM(S): 2 INJECTION INTRAMUSCULAR; INTRAVENOUS; SUBCUTANEOUS at 09:48

## 2017-11-21 RX ADMIN — Medication 400 MILLIGRAM(S): at 05:57

## 2017-11-21 RX ADMIN — ISOSORBIDE MONONITRATE 30 MILLIGRAM(S): 60 TABLET, EXTENDED RELEASE ORAL at 11:49

## 2017-11-21 RX ADMIN — Medication 1000 MILLIGRAM(S): at 19:00

## 2017-11-21 RX ADMIN — Medication 400 MILLIGRAM(S): at 11:50

## 2017-11-21 RX ADMIN — HYDROMORPHONE HYDROCHLORIDE 0.5 MILLIGRAM(S): 2 INJECTION INTRAMUSCULAR; INTRAVENOUS; SUBCUTANEOUS at 02:45

## 2017-11-21 RX ADMIN — Medication 1000 MILLIGRAM(S): at 00:17

## 2017-11-21 RX ADMIN — Medication 10 MILLIGRAM(S): at 18:24

## 2017-11-21 RX ADMIN — Medication 12.5 MILLIGRAM(S): at 23:59

## 2017-11-21 RX ADMIN — HEPARIN SODIUM 5000 UNIT(S): 5000 INJECTION INTRAVENOUS; SUBCUTANEOUS at 05:55

## 2017-11-21 RX ADMIN — CARVEDILOL PHOSPHATE 6.25 MILLIGRAM(S): 80 CAPSULE, EXTENDED RELEASE ORAL at 11:45

## 2017-11-21 RX ADMIN — Medication 500 MILLIGRAM(S): at 11:45

## 2017-11-21 RX ADMIN — Medication 400 MILLIGRAM(S): at 00:02

## 2017-11-21 RX ADMIN — DEXTROSE MONOHYDRATE, SODIUM CHLORIDE, AND POTASSIUM CHLORIDE 100 MILLILITER(S): 50; .745; 4.5 INJECTION, SOLUTION INTRAVENOUS at 09:44

## 2017-11-21 NOTE — PROGRESS NOTE ADULT - SUBJECTIVE AND OBJECTIVE BOX
[facility] AM Progress Note    STATUS POST:  Right hemicolectomy with anastomosis of ileum to colon    POST OPERATIVE DAY #: 1    S: Pt seen and examined this AM.   No Flatus. No BM.  Feeling well.     Vital Signs Last 24 Hrs  T(C): 36.4 (21 Nov 2017 09:37), Max: 36.6 (20 Nov 2017 21:15)  T(F): 97.5 (21 Nov 2017 09:37), Max: 97.9 (20 Nov 2017 21:15)  HR: 73 (21 Nov 2017 05:29) (65 - 79)  BP: 161/71 (21 Nov 2017 05:29) (122/60 - 186/84)  BP(mean): 96 (20 Nov 2017 23:15) (86 - 120)  RR: 18 (21 Nov 2017 09:37) (14 - 19)  SpO2: 98% (21 Nov 2017 09:37) (92% - 100%)    I&O's Summary    20 Nov 2017 07:01  -  21 Nov 2017 07:00  --------------------------------------------------------  IN: 1800 mL / OUT: 360 mL / NET: 1440 mL      Physical Exam  General Appearance:  Appears well, NAD, AAO x3  Chest: Equal expansion bilaterally, equal breath sounds  CV: Pulse regular presently  Abdomen: Soft, nondistended, nontender, appropriate incisional tenderness, dressings clean, dry and intact    LABS:                        10.4   16.1  )-----------( 222      ( 20 Nov 2017 20:26 )             31.9     11-20    141  |  107  |  13  ----------------------------<  120<H>  4.0   |  20<L>  |  0.89    Ca    7.5<L>      20 Nov 2017 20:26  Phos  3.2     11-20  Mg     2.1     11-20      PT/INR - ( 19 Nov 2017 23:29 )   PT: 12.6 sec;   INR: 1.11 ratio         PTT - ( 19 Nov 2017 23:29 )  PTT:30.2 sec      RADIOLOGY & ADDITIONAL STUDIES:

## 2017-11-21 NOTE — DISCHARGE NOTE ADULT - REASON FOR ADMISSION
generalized abdominal pain associated with melena found to have generalized abdominal pain associated with melena

## 2017-11-21 NOTE — DISCHARGE NOTE ADULT - MEDICATION SUMMARY - MEDICATIONS TO TAKE
I will START or STAY ON the medications listed below when I get home from the hospital:    aspirin 81 mg oral tablet  -- 1 tab(s) by mouth once a day  -- Indication: For CArdioprotection    acetaminophen 325 mg oral tablet  -- 2 tab(s) by mouth every 4 hours, As needed, Mild Pain (1 - 3)  -- Indication: For Pain    oxyCODONE 5 mg oral capsule  -- 1 cap(s) by mouth every 6 hours, As Needed -for severe pain MDD:4 tabs  -- Caution federal law prohibits the transfer of this drug to any person other  than the person for whom it was prescribed.  It is very important that you take or use this exactly as directed.  Do not skip doses or discontinue unless directed by your doctor.  May cause drowsiness.  Alcohol may intensify this effect.  Use care when operating dangerous machinery.  This prescription cannot be refilled.  Using more of this medication than prescribed may cause serious breathing problems.    -- Indication: For Pain    lisinopril 5 mg oral tablet  -- 1 tab(s) by mouth once a day  -- Indication: For High blood pressure    isosorbide mononitrate 30 mg oral tablet, extended release  -- 1 tab(s) by mouth once a day  -- Indication: For CArdioprotection    atorvastatin 40 mg oral tablet  -- 1 tab(s) by mouth once a day  -- Indication: For High Cholesterol    clopidogrel 75 mg oral tablet  -- 1 tab(s) by mouth once a day  -- Indication: For CArdioprotection    carvedilol 6.25 mg oral tablet  -- 1 tab(s) by mouth 2 times a day  -- Indication: For High blood pressure    amLODIPine 2.5 mg oral tablet  -- 1 tab(s) by mouth once a day  -- Indication: For High blood pressure    furosemide 20 mg oral tablet  -- 1 tab(s) by mouth every other day  -- Indication: For High blood pressure    ferrous sulfate 325 mg (65 mg elemental iron) oral tablet  -- 1 tab(s) by mouth 2 times a day  -- Indication: For Supplement    docusate sodium 100 mg oral capsule  -- 1 cap(s) by mouth once a day, As Needed  -- Indication: For Stool Softener    Multiple Vitamins oral tablet  -- 1 tab(s) by mouth once a day  -- Indication: For Supplement    Vitamin C 500 mg oral tablet  -- 1 tab(s) by mouth once a day  -- Indication: For Supplement I will START or STAY ON the medications listed below when I get home from the hospital:    aspirin 81 mg oral tablet  -- 1 tab(s) by mouth once a day  -- Indication: For CArdioprotection    acetaminophen 325 mg oral tablet  -- 2 tab(s) by mouth every 4 hours, As needed, Mild Pain (1 - 3)  -- Indication: For Pain    oxyCODONE 5 mg oral capsule  -- 1 cap(s) by mouth every 6 hours, As Needed -for severe pain MDD:4 tabs  -- Caution federal law prohibits the transfer of this drug to any person other  than the person for whom it was prescribed.  It is very important that you take or use this exactly as directed.  Do not skip doses or discontinue unless directed by your doctor.  May cause drowsiness.  Alcohol may intensify this effect.  Use care when operating dangerous machinery.  This prescription cannot be refilled.  Using more of this medication than prescribed may cause serious breathing problems.    -- Indication: For Pain    lisinopril 5 mg oral tablet  -- 1 tab(s) by mouth once a day  -- Indication: For High blood pressure    isosorbide mononitrate 30 mg oral tablet, extended release  -- 1 tab(s) by mouth once a day  -- Indication: For CArdioprotection    atorvastatin 40 mg oral tablet  -- 1 tab(s) by mouth once a day  -- Indication: For High Cholesterol    clopidogrel 75 mg oral tablet  -- 1 tab(s) by mouth once a day  -- Indication: For CArdioprotection    QUEtiapine 25 mg oral tablet  -- 1 tab(s) by mouth once a day (at bedtime)  -- Indication: For Agitation    carvedilol 6.25 mg oral tablet  -- 1 tab(s) by mouth 2 times a day  -- Indication: For High blood pressure    amLODIPine 2.5 mg oral tablet  -- 1 tab(s) by mouth once a day  -- Indication: For High blood pressure    nystatin 100,000 units/g topical powder  -- 1 application on skin once a day  -- Indication: For rash    furosemide 20 mg oral tablet  -- 1 tab(s) by mouth every other day  -- Indication: For High blood pressure    ferrous sulfate 325 mg (65 mg elemental iron) oral tablet  -- 1 tab(s) by mouth 2 times a day  -- Indication: For Supplement    docusate sodium 100 mg oral capsule  -- 1 cap(s) by mouth once a day, As Needed  -- Indication: For Stool Softener    Multiple Vitamins oral tablet  -- 1 tab(s) by mouth once a day  -- Indication: For Supplement    Vitamin C 500 mg oral tablet  -- 1 tab(s) by mouth once a day  -- Indication: For Supplement

## 2017-11-21 NOTE — PROGRESS NOTE ADULT - SUBJECTIVE AND OBJECTIVE BOX
Patient is a 89y old  Male who presents with a chief complaint of generalized abdominal pain associated with melena found to have (21 Nov 2017 10:39)      SUBJECTIVE / OVERNIGHT EVENTS:  No overnight events. Pt s/p successful laparoscopic right colectomy.   Pt feels well. Denied cp, sob, n/v/d.  No HA/dizziness.   Post surgical pain. Well controlled. Family at bedside.  Family including the son at bedside.       Vital Signs Last 24 Hrs  T(C): 36.2 (21 Nov 2017 12:48), Max: 36.6 (20 Nov 2017 21:15)  T(F): 97.2 (21 Nov 2017 12:48), Max: 97.9 (20 Nov 2017 21:15)  HR: 75 (21 Nov 2017 12:48) (69 - 79)  BP: 139/53 (21 Nov 2017 12:48) (122/60 - 186/84)  BP(mean): 96 (20 Nov 2017 23:15) (86 - 120)  RR: 18 (21 Nov 2017 12:48) (14 - 19)  SpO2: 98% (21 Nov 2017 12:48) (92% - 100%)  I&O's Summary    20 Nov 2017 07:01  -  21 Nov 2017 07:00  --------------------------------------------------------  IN: 1800 mL / OUT: 360 mL / NET: 1440 mL    21 Nov 2017 07:01  -  21 Nov 2017 14:19  --------------------------------------------------------  IN: 700 mL / OUT: 200 mL / NET: 500 mL        PHYSICAL EXAM:  GENERAL: NAD, Comfortable  HEAD:  Atraumatic, Normocephalic  EYES: EOMI, PERRLA, conjunctiva and sclera clear  NECK: Supple, No JVD  CHEST/LUNG: Clear to auscultation bilaterally; No wheeze  HEART: Regular rate and rhythm; No murmurs, rubs, or gallops  ABDOMEN: Soft, post surgical tenderness, Nondistended; Bowel sounds present, dressings d/c/i.  Neuro: AAO x 3, no focal deficit, 5/5 b/l extremities  EXTREMITIES:  2+ Peripheral Pulses, No clubbing, cyanosis, or edema, + paez  SKIN: No rashes or lesions    LABS:                        10.4   13.92 )-----------( 240      ( 21 Nov 2017 11:14 )             33.0     11-21    140  |  104  |  13  ----------------------------<  85  4.6   |  22  |  0.88    Ca    7.8<L>      21 Nov 2017 11:17  Phos  3.3     11-21  Mg     2.0     11-21      PT/INR - ( 19 Nov 2017 23:29 )   PT: 12.6 sec;   INR: 1.11 ratio         PTT - ( 19 Nov 2017 23:29 )  PTT:30.2 sec  CAPILLARY BLOOD GLUCOSE                RADIOLOGY & ADDITIONAL TESTS:    Imaging Personally Reviewed:  [x] YES  [ ] NO    Consultant(s) Notes Reviewed:  [x] YES  [ ] NO      MEDICATIONS  (STANDING):  ascorbic acid 500 milliGRAM(s) Oral daily  atorvastatin 40 milliGRAM(s) Oral at bedtime  carvedilol 6.25 milliGRAM(s) Oral every 12 hours  dextrose 5% + sodium chloride 0.45% with potassium chloride 20 mEq/L 1000 milliLiter(s) (100 mL/Hr) IV Continuous <Continuous>  enoxaparin Injectable 40 milliGRAM(s) SubCutaneous daily  isosorbide   mononitrate ER Tablet (IMDUR) 30 milliGRAM(s) Oral daily  multivitamin 1 Tablet(s) Oral daily    MEDICATIONS  (PRN):  HYDROmorphone  Injectable 0.5 milliGRAM(s) IV Push every 4 hours PRN Moderate Pain (4 - 6)  HYDROmorphone  Injectable 1 milliGRAM(s) IV Push every 4 hours PRN Severe Pain (7 - 10)      Care Discussed with Consultants/Other Providers [x] YES  [ ] NO    HEALTH ISSUES - PROBLEM Dx:  Preoperative cardiovascular examination: Preoperative cardiovascular examination  Colon adenocarcinoma: Colon adenocarcinoma  Nutrition, metabolism, and development symptoms: Nutrition, metabolism, and development symptoms  Prophylactic measure: Prophylactic measure  Essential hypertension: Essential hypertension  HLD (hyperlipidemia): HLD (hyperlipidemia)  CAD (coronary artery disease): CAD (coronary artery disease)  Anemia: Anemia  Epigastric abdominal pain: Epigastric abdominal pain  Melena: Melena

## 2017-11-21 NOTE — DIETITIAN INITIAL EVALUATION ADULT. - PHYSICAL APPEARANCE
overweight/Pt denies wt loss PTA. Reports UBW of ~140 pounds but states uncertainty regarding accuracy. Noted with 11/15 wt of 156 pounds and 11/10 wt of 174.6 pounds. Question accuracy and continue to monitor.

## 2017-11-21 NOTE — PROVIDER CONTACT NOTE (OTHER) - ASSESSMENT
/73, HR 62, RR 18, O2 98% on room air, pt c/o of dizziness when eyes are closed, denies chest pain or SOB, no distress noted.

## 2017-11-21 NOTE — DISCHARGE NOTE ADULT - CARE PROVIDERS DIRECT ADDRESSES
,[email or web address] ,[email or web address] ,[email or web address],[email or web address],DirectAddress_Unknown,DirectAddress_Unknown ,[email or web address],DirectAddress_Unknown,DirectAddress_Unknown,DirectAddress_Unknown

## 2017-11-21 NOTE — DISCHARGE NOTE ADULT - ADDITIONAL INSTRUCTIONS
[facility] - Renee Olivia / Rafael / Taye - [phone] Please call Phone: [phone] Dr. Flores to make an appointment in 1-2 weeks  Please call phone:  [phone] [facility] - Renee Olivia / Rafael / Taye to make an appointment in 1-2 weeks.  Please call phone: [phone] Dr. Diaz (cardiology) to make an appointment in 1-2 weeks Please call Phone: [phone] Dr. Flores to make an appointment in 1-2 weeks  Please call phone:  [phone] [facility] - Renee Olivia / Rafael / Taye to make an appointment in 1-2 weeks.  You were seen by Dr. Diaz (cardiology)  in the hospital.  Please follow up with your cardiologist, Dr. Marcos Pierce to make an appointment in 1-2 weeks. Please call Phone: [phone] Dr. Flores to make an appointment in 1-2 weeks  Please call phone:  [phone] [facility] - Renee Olivia / Rafael / Taye to make an appointment in 1-2 weeks.  You were seen by Dr. Diaz (cardiology)  in the hospital.  Please follow up with your cardiologist, Dr. Marcos Pierce to make an appointment in 1-2 weeks.  Please follow up with Dr. Riddle in 1-2 weeks Phone: [phone]. Please call Phone: [phone] Dr. Flores to make an appointment in 1-2 weeks  Please call phone:  [phone] [facility] - Renee Olivia / Rafael / Taye to make an appointment in 1-2 weeks.  You were seen by Dr. Diaz (cardiology)  in the hospital.  Please follow up with your cardiologist, Dr. Marcos Pierce to make an appointment in 1-2 weeks.    Please follow up with Dr. Riddle (psychiatry) in 1-2 weeks.  Please call [phone] for an appointment.  Please follow up with Dr. Mendoza (podiatry) within 2 months of discharge (or home podiatrist).  Please Call [phone] for an appointment.

## 2017-11-21 NOTE — DIETITIAN INITIAL EVALUATION ADULT. - PROBLEM SELECTOR PLAN 2
--suspect gastric ulcer. Symptoms and normal lipase suggest against pancreatitis. Also on DDx should be ischemic bowel, though would be more likely to cause hematochezia/BRBPR. Pain currently resolved.  --if recurs, will send lactate, consider CT A/P to rule out ishemic bowel, though would expect hematochezia with bowel ischemia

## 2017-11-21 NOTE — PROGRESS NOTE ADULT - SUBJECTIVE AND OBJECTIVE BOX
SURGERY POST OP CHECK    SUBJECTIVE: Pt seen and examined. Pt. comfortable and in NAD. Patient is hard of hearing and unable to answer questions at the time of exam.         Vital Signs Last 24 Hrs  T(C): 36.3 (21 Nov 2017 01:00), Max: 36.6 (20 Nov 2017 21:15)  T(F): 97.4 (21 Nov 2017 01:00), Max: 97.9 (20 Nov 2017 21:15)  HR: 73 (21 Nov 2017 01:00) (65 - 79)  BP: 129/69 (21 Nov 2017 01:00) (122/60 - 186/84)  BP(mean): 96 (20 Nov 2017 23:15) (86 - 120)  RR: 18 (21 Nov 2017 01:00) (14 - 19)  SpO2: 96% (21 Nov 2017 01:00) (92% - 100%)    Physical Exam:  General Appearance: Appears well, NAD  Neck: Supple  Chest: Equal expansion bilaterally, equal breath sounds  CV: Pulse regular presently  Abdomen: Soft, nontense, appropriate incisional tenderness, dressings clean and dry and intact  Extremities: Grossly symmetric    LABS:                        10.4   16.1  )-----------( 222      ( 20 Nov 2017 20:26 )             31.9     11-20    141  |  107  |  13  ----------------------------<  120<H>  4.0   |  20<L>  |  0.89    Ca    7.5<L>      20 Nov 2017 20:26  Phos  3.2     11-20  Mg     2.1     11-20      PT/INR - ( 19 Nov 2017 23:29 )   PT: 12.6 sec;   INR: 1.11 ratio         PTT - ( 19 Nov 2017 23:29 )  PTT:30.2 sec      INs and OUTs:    11-19-17 @ 07:01  -  11-20-17 @ 07:00  --------------------------------------------------------  IN: 1760 mL / OUT: 0 mL / NET: 1760 mL    11-20-17 @ 07:01  -  11-21-17 @ 01:57  --------------------------------------------------------  IN: 1200 mL / OUT: 160 mL / NET: 1040 mL

## 2017-11-21 NOTE — PROGRESS NOTE ADULT - PROBLEM SELECTOR PLAN 1
-- GI and surgery eval appreciated.   -- hgb remains relatively stable.    -- s/p EGD and Colonoscopy 11/13 which shows ascending colon mass  -- Biopsy shows adenocarcinoma.   -- CT abdomen/chest shows no evidence of metastasis.  -- Oncology Dr. Kothari consult appreciated.  -- Surg-onc Dr Flores consulted  -- Pt s/p successful laparoscopic right colectomy POD1. doing well. Await Gi function to return. no flatus yet. -- GI and surgery eval appreciated.   -- hgb remains relatively stable.    -- s/p EGD and Colonoscopy 11/13 which shows ascending colon mass  -- Biopsy shows adenocarcinoma.   -- CT abdomen/chest shows no evidence of metastasis.  -- Oncology Dr. Kothari consult appreciated.  -- Surg-onc Dr Flores consulted  -- Pt s/p successful laparoscopic right colectomy POD1. doing well. Await Gi function to return. no flatus yet.    ** caution with IVF in 89 year old. Once GI function returns, IVF should be decreased and home Lasix to be restarted.

## 2017-11-21 NOTE — PROVIDER CONTACT NOTE (OTHER) - ACTION/TREATMENT ORDERED:
MD aware & pending to bedside, 10mg Lasix IV Push given & will continue to monitor output strictly, night RN aware, no other intervention at this time, will continue to monitor.

## 2017-11-21 NOTE — PROVIDER CONTACT NOTE (OTHER) - ASSESSMENT
/83, HR 71, RR 18, O2 98% on room air, afebrile, c/o of dizziness and abdominal pain, no distress noted.

## 2017-11-21 NOTE — PROGRESS NOTE ADULT - ASSESSMENT
89M S/p Lap R colectomy  - NPO  - IVF  - HSQ for DVT ppx  - Tylenol IV for pain control  - F/u am labs

## 2017-11-21 NOTE — DISCHARGE NOTE ADULT - OTHER SIGNIFICANT FINDINGS
Colonoscopy-  - Likely malignant partially obstructing tumor in the ascending colon.                        Biopsied. Tattooed.                       - Diverticulosis in the sigmoid colon, in the descending colon, in 		the  transverse colon and in the ascending colon.                       - The examined portion of the ileum was normal.                       - Internal hemorrhoids.  	F/U pathology                       - Continue to hold ASA/Plavix if possible                       - CT chest/ abdomen / pelvis for staging purposes 11/13 Colonoscopy-  - Likely malignant partially obstructing tumor in the ascending colon.                        Biopsied. Tattooed.                       - Diverticulosis in the sigmoid colon, in the descending colon, in the  transverse colon and in the ascending colon.                       - The examined portion of the ileum was normal.                       - Internal hemorrhoids.  	F/U pathology                       - Continue to hold ASA/Plavix if possible                       - CT chest/ abdomen / pelvis for staging purposes    11/14 Chest CT w/ IV con, CT abd/pelvis  IMPRESSION: Large right colon mass. No evidence of metastatic disease.   Moderate pleural effusions.

## 2017-11-21 NOTE — DIETITIAN INITIAL EVALUATION ADULT. - NS AS NUTRI INTERV MEALS SNACK
Medical team to advance diet when medically feasible via tolerated route. Consider advancing to clear liquid, followed by low fiber soft diet as tolerated.

## 2017-11-21 NOTE — PROGRESS NOTE ADULT - PROBLEM SELECTOR PLAN 2
-- suspect multifactorial, blood loss vs. iron deficiency from bleeding colon mass.   -- continue home iron, s/p 2 doses of IV Iron.   -- Hgb stable. monitor.

## 2017-11-21 NOTE — DISCHARGE NOTE ADULT - PATIENT PORTAL LINK FT
“You can access the Follow[employer] Patient Portal, offered by [facility], by registering with the following website: [email or web address]”

## 2017-11-21 NOTE — DISCHARGE NOTE ADULT - PLAN OF CARE
Recover from surgery Low fiber diet as tolerated.  You may shower. Pat Dry abdomen. Leave the white steri strips in place, they will fall off on their own in approximately 5-7 days. Please make an appointment to follow up with Dr. Flores in 7-10 days. Low fiber diet as tolerated.  You may shower. Pat Dry abdomen. Leave the white steri strips in place, they will fall off on their own in approximately 5-7 days. Do not drive while taking narcotic pain medication.  Please make an appointment to follow up with Dr. Flores in 7-10 days. Low fiber diet as tolerated.  You may shower. Pat Dry abdomen. Leave the white steri strips in place, they will fall off on their own in approximately 5-7 days. Do not drive while taking narcotic pain medication.  Please make an appointment to follow up with Dr. Flores in 7-10 days.    Pt had painful toenails and should follow up with podiatry in 2 months after discharge with Kimberly Carter [phone].

## 2017-11-21 NOTE — DISCHARGE NOTE ADULT - CARE PROVIDER_API CALL
James Flores (MD), Surgery  [address] [place] [address]  Phone: [phone]  Fax: [phone] James Flores), Surgery  [address] [place] [address]  Phone: [phone]  Fax: [phone]    Henry Kothari), Internal Medicine  [address] [place] [address]  Phone: [phone]  Fax: [phone] James Flores), Surgery  [address] [place] [address]  Phone: [phone]  Fax: [phone]    Henry Kothari), Internal Medicine  [address] [facility] [address]  [place] [address]  Phone: [phone]  Fax: [phone]    Saul Diaz), Cardiovascular Disease; Internal Medicine  [address] [facility]  [address]  [place] [address]  Phone: [phone]  Fax: [phone] James Flores), Surgery  [address] [place] [address]  Phone: [phone]  Fax: [phone]    Henry Kothari), Internal Medicine  [address] [facility]  [address]  [place] [address]  Phone: [phone]  Fax: [phone]    Marcos Pierce), Cardiovascular Disease; Internal Medicine  [address] [facility]  [address]  [place] [address]  Phone: [phone]  Fax: [phone] James Flores), Surgery  [address] [place] [address]  Phone: [phone]  Fax: [phone]    Henry Kothari), Internal Medicine  [address] [facility]  [address]  [place] [address]  Phone: [phone]  Fax: [phone]    Marcos Pierce), Cardiovascular Disease; Internal Medicine  [address] [facility]  [address]  [place] [address]  Phone: [phone]  Fax: [phone]    Alexander Riddle), Psychiatry  [address] [place] [address]  Phone: [phone]  Fax: [phone] James Flores), Surgery  [address] [place] [address]  Phone: [phone]  Fax: [phone]    Henry Kothari), Internal Medicine  [address] [facility]  [address]  [place] [address]  Phone: [phone]  Fax: [phone]    Marcos Pierce), Cardiovascular Disease; Internal Medicine  [address] [facility]  [address]  [place] [address]  Phone: [phone]  Fax: [phone]    Alexander Riddle), Psychiatry  [address] [place] [address]  Phone: [phone]  Fax: [phone]

## 2017-11-21 NOTE — DISCHARGE NOTE ADULT - PROVIDER TOKENS
TOKEN:'[id number]' TOKEN:'[id number]',TOKEN:'[id number]' TOKEN:'[id number]',TOKEN:'[id number]',TOKEN:'[id number]' TOKEN:'[id number]',TOKEN:'[id number]',TOKEN:'[id number]' TOKEN:'[id number]',TOKEN:'[id number]',TOKEN:'[id number]',TOKEN:'[id number]'

## 2017-11-21 NOTE — DIETITIAN INITIAL EVALUATION ADULT. - OTHER INFO
Pt seen for length of stay. Pt hard of hearing, no hearing aides at present- plans with family to bring in today per RN. Pt admitted with melena and epigastric abdominal pain, found to have tumor in the ascending colon, now POD #1 s/p right hemicolectomy with anastomosis of ileum to colon. Pt awaiting GI function. No flatus, no BM. Denies N+V. States NKFA. Denies difficulty chewing/swallowing but noted on soft diet 11/13-11/19 with % po intake per RN flowsheets.

## 2017-11-21 NOTE — PROGRESS NOTE ADULT - PROBLEM SELECTOR PLAN 2
CT scans shows isolated disease  - s/p laparoscopic resctionon 11/20/2017  - Will follow up pathology once available to discuss if chemotherapy is needed post-operatively with patient and son.   - post-operative care as per surgical oncology  - Will follow    MD [facility] - A Division of [address]  [place] [address]  (T) [phone]  (F) [phone]

## 2017-11-21 NOTE — PROVIDER CONTACT NOTE (OTHER) - ACTION/TREATMENT ORDERED:
[facility] team aware, Coreg PO medication and IV Tylenol given, BP reassessed 45 minutes later & now 139/53, HR 75, he now denies dizziness, no other intervention, will continue to monitor.

## 2017-11-21 NOTE — PROGRESS NOTE ADULT - ATTENDING COMMENTS
Awake, appropriate responses to questions today.  uo adequate  OOB and amb today -- PT consult.  Pt will likely need Rehab  Cont IVF  Await flatus  Cont IV tylenol around the clock Awake, appropriate responses to questions today.  uo adequate  OOB and amb today -- PT consult.  Pt will likely need Rehab  Cont IVF  Await flatus  Cont IV tylenol around the clock  ice chips, sips of water today

## 2017-11-21 NOTE — DISCHARGE NOTE ADULT - CARE PLAN
Principal Discharge DX:	Colon neoplasm  Goal:	Recover from surgery  Instructions for follow-up, activity and diet:	Low fiber diet as tolerated.  You may shower. Pat Dry abdomen. Leave the white steri strips in place, they will fall off on their own in approximately 5-7 days. Please make an appointment to follow up with Dr. Flores in 7-10 days. Principal Discharge DX:	Colon neoplasm  Goal:	Recover from surgery  Instructions for follow-up, activity and diet:	Low fiber diet as tolerated.  You may shower. Pat Dry abdomen. Leave the white steri strips in place, they will fall off on their own in approximately 5-7 days. Do not drive while taking narcotic pain medication.  Please make an appointment to follow up with Dr. Flores in 7-10 days. Principal Discharge DX:	Colon adenocarcinoma  Goal:	Recover from surgery  Instructions for follow-up, activity and diet:	Low fiber diet as tolerated.  You may shower. Pat Dry abdomen. Leave the white steri strips in place, they will fall off on their own in approximately 5-7 days. Do not drive while taking narcotic pain medication.  Please make an appointment to follow up with Dr. Flores in 7-10 days.  Secondary Diagnosis:	Coronary artery disease, angina presence unspecified, unspecified vessel or lesion type, unspecified whether native or transplanted heart  Secondary Diagnosis:	Mixed hyperlipidemia  Secondary Diagnosis:	Essential hypertension Principal Discharge DX:	Colon adenocarcinoma  Goal:	Recover from surgery  Instructions for follow-up, activity and diet:	Low fiber diet as tolerated.  You may shower. Pat Dry abdomen. Leave the white steri strips in place, they will fall off on their own in approximately 5-7 days. Do not drive while taking narcotic pain medication.  Please make an appointment to follow up with Dr. Flores in 7-10 days.    Pt had painful toenails and should follow up with podiatry in 2 months after discharge with Dr. Mendoza, [place] [phone].  Secondary Diagnosis:	Coronary artery disease, angina presence unspecified, unspecified vessel or lesion type, unspecified whether native or transplanted heart  Secondary Diagnosis:	Mixed hyperlipidemia  Secondary Diagnosis:	Essential hypertension

## 2017-11-21 NOTE — DISCHARGE NOTE ADULT - HOSPITAL COURSE
89M PMH CAD s/p stents on ASA, Plavix, HTN, HLD, possible CHF p/w 2 days of generalized abdominal pain associated with melena. (Dx Acute blood loss anemia/abd pain on ASA/ Plavix-Hb 9.4). 1  11/10 	s/p EGD with normal results, d'cd PPI, resumed diet, c/w holding ASA & Plavix  11/11	clear liquid diet  11/12	Colonoscopy- Likely malignant partially obstructing tumor in the ascending colon.   Surg Onc Consulted  11/13	GI favors surgery as optimal management for lesion. Awaiting family decision  11/16	spoke to the son (POA) @ bedside, who d/w surg team, will be scheduled for right hemicolectomy, card cleared him for procedure with risk.     On 11/20/17 patient underwent a lap right colectomy. The patient tolerated the procedure well. There were no complications. The patient was extubated in the OR and transferred to the PACU in stable condition and transferred to a surgical floor. The patient had daily wound care and was seen by physical therapy which recommended discharge to _____.  Once bowel function returned, diet was advanced as tolerated. The patient's pain was controlled by IV pain medications and then by PO pain medications. The patient was placed back on home medications. 89M PMH CAD s/p stents on ASA, Plavix, HTN, HLD, possible CHF p/w 2 days of generalized abdominal pain associated with melena. (Dx Acute blood loss anemia/abd pain on ASA/ Plavix-Hb 9.4). 1  11/10 	s/p EGD with normal results, d'cd PPI, resumed diet, c/w holding ASA & Plavix  11/11	clear liquid diet  11/12	Colonoscopy- Likely malignant partially obstructing tumor in the ascending colon.   Surg Onc Consulted  11/13	GI favors surgery as optimal management for lesion. Awaiting family decision  11/16	spoke to the son (POA) @ bedside, who d/w surg team, will be scheduled for right hemicolectomy, card cleared him for procedure with risk.     On 11/20/17 patient underwent a lap right colectomy for adenocarcinoma. The patient tolerated the procedure well. There were no complications. The patient was extubated in the OR and transferred to the PACU in stable condition and transferred to a surgical floor. The patient had daily wound care and was seen by physical therapy which recommended discharge to Home with Home PT.  Once bowel function returned, diet was advanced as tolerated. The patient's pain was controlled by IV pain medications and then by PO pain medications. The patient was placed back on home medications.   Postoperatively patient with delirium/psychosis and neurology was consulted which stated likely cause with contribution from anesthesia exposure, narcotics and insomnia. Resolved.   At the time of discharge, the patient was hemodynamically stable, was tolerating PO diet, was voiding urine and passing stool, was ambulating, and was comfortable with adequate pain control. The patient was instructed to follow up with Dr. Flores within 1-2 weeks after discharge from the hospital. The patient/family felt comfortable with discharge. The patient was discharged to home. The patient had no other issues. 89M PMH CAD s/p stents on ASA, Plavix, HTN, HLD, possible CHF p/w 2 days of generalized abdominal pain associated with melena. (Dx Acute blood loss anemia/abd pain on ASA/ Plavix-Hb 9.4). 1  11/10 	s/p EGD with normal results, d'cd PPI, resumed diet, c/w holding ASA & Plavix  11/11	clear liquid diet  11/12	Colonoscopy- Likely malignant partially obstructing tumor in the ascending colon.   Surg Onc Consulted  11/13	GI favors surgery as optimal management for lesion. Awaiting family decision  11/16	spoke to the son (POA) @ bedside, who d/w surg team, will be scheduled for right hemicolectomy, card cleared him for procedure with risk.     On 11/20/17 patient underwent a lap right colectomy for adenocarcinoma. The patient tolerated the procedure well. There were no complications. The patient was extubated in the OR and transferred to the PACU in stable condition and transferred to a surgical floor. The patient had daily wound care and was seen by physical therapy which recommended discharge to Home with Home PT.  Once bowel function returned, diet was advanced as tolerated. The patient's pain was controlled by IV pain medications and then by PO pain medications. The patient was placed back on home medications.   Postoperatively patient with delirium/psychosis and neurology was consulted which stated likely cause with contribution from anesthesia exposure, narcotics and insomnia. Resolved.     11/21/2017: Heme/Onc: - Will follow up pathology once available to discuss if chemotherapy is needed post-operatively with patient and son.   11/22/17 :  Started on clear liquid diet today. Post op delirium was resolving and patient was continued to be monitored.   11/23/2017        At the time of discharge, the patient was hemodynamically stable, was tolerating PO diet, was voiding urine and passing stool, was ambulating, and was comfortable with adequate pain control. The patient was instructed to follow up with Dr. Flores within 1-2 weeks after discharge from the hospital. The patient/family felt comfortable with discharge. The patient was discharged to home. The patient had no other issues. 89M PMH CAD s/p stents on ASA, Plavix, HTN, HLD, possible CHF p/w 2 days of generalized abdominal pain associated with melena. (Dx Acute blood loss anemia/abd pain on ASA/ Plavix-Hb 9.4). 1  11/10 	s/p EGD with normal results, d'cd PPI, resumed diet, c/w holding ASA & Plavix  11/11	clear liquid diet  11/12	Colonoscopy- Likely malignant partially obstructing tumor in the ascending colon.   Surg Onc Consulted  11/13	GI favors surgery as optimal management for lesion. Awaiting family decision  11/14    CT abdomen/chest shows no evidence of metastasis.  11/16	spoke to the son (POA) @ bedside, who d/w surg team, will be scheduled for right hemicolectomy, card cleared him for procedure with risk.     On 11/20/17 patient underwent a lap right colectomy for adenocarcinoma. The patient tolerated the procedure well. There were no complications. The patient was extubated in the OR and transferred to the PACU in stable condition and transferred to a surgical floor. The patient had daily wound care and was seen by physical therapy which recommended discharge to Home with Home PT.  Once bowel function returned, diet was advanced as tolerated. The patient's pain was controlled by IV pain medications and then by PO pain medications. The patient was placed back on home medications.   Postoperatively patient with delirium/psychosis and neurology was consulted which stated likely cause with contribution from anesthesia exposure, narcotics and insomnia. Resolved.     11/21/2017: Heme/Onc: - Will follow up pathology once available to discuss if chemotherapy is needed post-operatively with patient and son.   11/22/17 :  Started on clear liquid diet today. Post op delirium was resolved and patient was continued to be monitored.   11/23/2017- Cardiology Re-add amlodipine 2.5 mg (lower than usual prior dose which was 5 mg). BP monitored and need to increase dose or re-add Ramipril will be re-assessed  11/24/2017- Hemoglobin is 10.7- Stable/Improved.  Will follow up pathology once available to discuss if chemotherapy is needed post-operatively.  Cardiology: holding Plavix in setting of acute blood loss anemia, will hold AC for now, restarted on aspirin. Isosorbide mononitrite, Lasix. Norvasc, & Lisinopril 5 mg regimen was continued and BP was monitored.  11/25/2017- Low residue diet with ensure was started. Hgb & BP was monitored.  11/26/2017- Plan for discharge.  If patient is able to ambulate today will be discharged home with home PT.  11/27/2017-  At the time of discharge, the patient was hemodynamically stable, was tolerating PO diet, was voiding urine and passing stool, was ambulating, and was comfortable with adequate pain control. The patient was instructed to follow up with Dr. Flores within 1-2 weeks after discharge from the hospital. The patient/family felt comfortable with discharge. The patient was discharged to home with home PT. The patient had no other issues. 89M PMH CAD s/p stents on ASA, Plavix, HTN, HLD, possible CHF p/w 2 days of generalized abdominal pain associated with melena. (Dx Acute blood loss anemia/abd pain on ASA/ Plavix-Hb 9.4). 1  11/10 	s/p EGD with normal results, d'cd PPI, resumed diet, c/w holding ASA & Plavix  11/11	clear liquid diet  11/12	Colonoscopy- Likely malignant partially obstructing tumor in the ascending colon.   Surg Onc Consulted  11/13	GI favors surgery as optimal management for lesion. Awaiting family decision  11/14    CT abdomen/chest shows no evidence of metastasis.  11/16	spoke to the son (POA) @ bedside, who d/w surg team, will be scheduled for right hemicolectomy, card cleared him for procedure with risk.     On 11/20/17 patient underwent a lap right colectomy for adenocarcinoma. The patient tolerated the procedure well. There were no complications. The patient was extubated in the OR and transferred to the PACU in stable condition and transferred to a surgical floor. The patient had daily wound care and was seen by physical therapy which recommended discharge to Home with Home PT.  Once bowel function returned, diet was advanced as tolerated. The patient's pain was controlled by IV pain medications and then by PO pain medications. The patient was placed back on home medications.   Postoperatively patient with delirium/psychosis and neurology was consulted which stated likely cause with contribution from anesthesia exposure, narcotics and insomnia. Resolved.     11/21/2017: Heme/Onc: - Will follow up pathology once available to discuss if chemotherapy is needed post-operatively with patient and son.   11/22/17 :  Started on clear liquid diet today. Post op delirium was resolved and patient was continued to be monitored.   11/23/2017-  Amlodipine 2.5 mg  was added as per cards (lower than usual prior dose which was 5 mg). BP monitored.  11/24/2017- Hemoglobin is 10.7- Stable/Improved.  Will follow up pathology once available to discuss if chemotherapy is needed post-operatively.  anticoagulation held for now, restarted on aspirin. Isosorbide mononitrite, Lasix. Norvasc, & Lisinopril 5 mg regimen was continued and BP was monitored.  11/25/2017- Low residue diet with ensure was started. Hgb & BP was monitored.  11/26/2017- Plan for discharge to home with home PT.  11/27/2017- Patient family expresses concern that patient might need/benefit from subacute rehab.  PT was consulted to reassess patients needs.  Podiatry was also consulted regarding overgrown toenails and was diagnosed with onychomycosis. Podiatry performed debridement of elognated nails, & pedal hygine education.  Patient also became agitated overnight, psychiatry was consulted and patient was put on 25mg Seroquel daily; agitation now resolved.  11/28/2017-  At the time of discharge, the patient was hemodynamically stable, was tolerating PO diet, was voiding urine and passing stool, was ambulating, and was comfortable with adequate pain control. The patient was instructed to follow up with Dr. Flores within 1-2 weeks after discharge from the hospital. The patient/family felt comfortable with discharge. The patient was discharged to Subacute Rehab. The patient had no other issues.  Patient instructed to follow up with cardiology, psych, and podiatry as listed above. 89M PMH CAD s/p stents on ASA, Plavix, HTN, HLD, possible CHF p/w 2 days of generalized abdominal pain associated with melena. (Dx Acute blood loss anemia/abd pain on ASA/ Plavix-Hb 9.4). 1  11/10 	s/p EGD with normal results, d'cd PPI, resumed diet, c/w holding ASA & Plavix  11/11	clear liquid diet  11/12	Colonoscopy- Likely malignant partially obstructing tumor in the ascending colon.   Surg Onc Consulted  11/13	GI favors surgery as optimal management for lesion. Awaiting family decision  11/14    CT abdomen/chest shows no evidence of metastasis.  11/16	spoke to the son (POA) @ bedside, who d/w surg team, will be scheduled for right hemicolectomy, card cleared him for procedure with risk.     On 11/20/17 patient underwent a lap right colectomy for adenocarcinoma. The patient tolerated the procedure well. There were no complications. The patient was extubated in the OR and transferred to the PACU in stable condition and transferred to a surgical floor. The patient had daily wound care and was seen by physical therapy which recommended discharge to Home with Home PT.  Once bowel function returned, diet was advanced as tolerated. The patient's pain was controlled by IV pain medications and then by PO pain medications. The patient was placed back on home medications.   Postoperatively patient with delirium/psychosis and neurology was consulted which stated likely cause with contribution from anesthesia exposure, narcotics and insomnia. Resolved.     11/21/2017: Heme/Onc: - Will follow up pathology once available to discuss if chemotherapy is needed post-operatively with patient and son.   11/22/17 :  Started on clear liquid diet today. Post op delirium was resolved and patient was continued to be monitored.   11/23/2017-  Amlodipine 2.5 mg  was added as per cards (lower than usual prior dose which was 5 mg). BP monitored.  11/24/2017- Hemoglobin is 10.7- Stable/Improved.  Will follow up pathology once available to discuss if chemotherapy is needed post-operatively.  anticoagulation held for now, restarted on aspirin. Isosorbide mononitrite, Lasix. Norvasc, & Lisinopril 5 mg regimen was continued and BP was monitored.  11/25/2017- Low residue diet with ensure was started. Hgb & BP was monitored.  11/27/2017- Patient family expresses concern that patient might need/benefit from subacute rehab.  PT was consulted to reassess patients needs.  Podiatry was also consulted regarding overgrown toenails and was diagnosed with onychomycosis. Podiatry performed debridement of elognated nails, & pedal hygine education.  Patient also became agitated overnight, psychiatry was consulted and patient was put on 25mg Seroquel daily; agitation now resolved.  11/28/2017-  At the time of discharge, the patient was hemodynamically stable, was tolerating PO diet, was voiding urine and passing stool, was ambulating, and was comfortable with adequate pain control. The patient was instructed to follow up with Dr. Flores within 1-2 weeks after discharge from the hospital. The patient/family felt comfortable with discharge. The patient was discharged to Subacute Rehab. The patient had no other issues.  Patient instructed to follow up with cardiology, psych, and podiatry as listed above.    Heme/Onc : no RT or Chemo at this time. Will follow up as an outpatient for further management.

## 2017-11-21 NOTE — PROGRESS NOTE ADULT - SUBJECTIVE AND OBJECTIVE BOX
[facility] Hematology/Oncology - Renee Olivia / Rafael / Taye - [phone]  Primary Outpatient Hematologist/Oncologist: None    Subjective  Patient seen this morning. Doing well. S/p surgical resection yesterday. Mild pain in the abdomen. No flatus yet.     ROS:  General:  (+)Pain, (-)Decrease appetite, (-)Fevers, (-)Chills, (-)Weight Loss  Eyes: (-)Blurry Vision, (-)Double Vision, (-)Vision Loss  ENT: (-)Sinus Congestion, (-)Decreased Hearing, (-)Nosebleeds, (-)Sore Throat  Cardiac: (-)Chest Pain, (-)Palpitations, (-)Shortness of breathe on exertion  Respiratory: (-)Cough, (-)hemoptysis, (-)Shortness of breathe  GI: (-)Nausea, (-)Vomiting, (-)Diarrhea, (-)Constipation, (+)Melena, (-)BRBPR  : (-)Hematuria, (-)Dysuria, (-)Polyuia  MSK: (-)Back pain, (-)Joint pain, (-)Stiffness  Dermatology: (-)Rash, (-)Itching  Neurology:  (-)Numbness, (-)Tingling, (-)Difficulty Walking, (-)Tremors, (-)Weakness  Psych: (-)Anxiety, (-)Depression, (-)Memory Loss  Hematology:  (-)Easy Bruising, (-)Easy Bleeding, (-)Night Sweats.     MEDICATIONS  (STANDING):  acetaminophen  IVPB. 1000 milliGRAM(s) IV Intermittent once  ascorbic acid 500 milliGRAM(s) Oral daily  atorvastatin 40 milliGRAM(s) Oral at bedtime  carvedilol 6.25 milliGRAM(s) Oral every 12 hours  dextrose 5% + sodium chloride 0.45% with potassium chloride 20 mEq/L 1000 milliLiter(s) (100 mL/Hr) IV Continuous <Continuous>  heparin  Injectable 5000 Unit(s) SubCutaneous every 8 hours  isosorbide   mononitrate ER Tablet (IMDUR) 30 milliGRAM(s) Oral daily  multivitamin 1 Tablet(s) Oral daily    Allergies  No Known Allergies    Vital Signs Last 24 Hrs  T(C): 36.3 (21 Nov 2017 05:29), Max: 36.6 (20 Nov 2017 21:15)  T(F): 97.4 (21 Nov 2017 05:29), Max: 97.9 (20 Nov 2017 21:15)  HR: 73 (21 Nov 2017 05:29) (65 - 79)  BP: 161/71 (21 Nov 2017 05:29) (122/60 - 186/84)  BP(mean): 96 (20 Nov 2017 23:15) (86 - 120)  RR: 18 (21 Nov 2017 05:29) (14 - 19)  SpO2: 98% (21 Nov 2017 05:29) (92% - 100%)    Physical Exam:  General: AAO x 3. NAD. Sitting in chair comfortably. Hard of hearing.   HEENT: clear oropharynx, anicteric sclera, pink conjunctivae, EOMi. PERRLA  Cardiac: S1, S2 present. No audible murmurs. RRR  Lungs: CTA B/L.   Abdomen: Soft, Non-Tender, Non-Distended. No palpable hepatosplenomegaly. Healing laparoscopy scars   Extremities: 2+ pulses. No edema  Skin: No Rashes. No petechiae  Neuro: No focal motor or sensory deficits.     Labs:                        10.4   16.1  )-----------( 222      ( 20 Nov 2017 20:26 )             31.9   11-20    141  |  107  |  13  ----------------------------<  120<H>  4.0   |  20<L>  |  0.89    Ca    7.5<L>      20 Nov 2017 20:26  Phos  3.2     11-20  Mg     2.1     11-20    Radiology:  Colonoscopy  Impression:          - Likely malignant partially obstructingtumor in the ascending colon.                        Biopsied. Tattooed.                       - Diverticulosis in the sigmoid colon, in the descending colon, in the                        transverse colon and in the ascending colon.       - The examined portion of the ileum was normal.                       - Internal hemorrhoids.  Recommendation:      - Return patient to hospital [facility] for ongoing care.                       - Full liquid diet today.                       - F/U pathology                       - Continue to hold ASA/Plavix if possible                       - CT chest/ abdomen / pelvis for staging purposes    CT Chest/Abdomen/Pelvis  - Isolated Lesion. No metastatic lesions seen.     Pathology:  Surgical Pathology Report:   ACCESSION No:  [id number]  MICHELLE HERRERA                     1  Surgical Final Report  Final Diagnosis  1     Ascending colon mass biopsy:  - Invasive adenocarcinoma.    Note: Dr. Hayes has reviewed this case and concurred the diagnosis.    Verified by: Kristen Dasilva M.D.  (Electronic Signature)  Reported on: 11/14/17 17:57 EST,[address] [place] [address]

## 2017-11-21 NOTE — DISCHARGE NOTE ADULT - SECONDARY DIAGNOSIS.
Coronary artery disease, angina presence unspecified, unspecified vessel or lesion type, unspecified whether native or transplanted heart Mixed hyperlipidemia Essential hypertension

## 2017-11-21 NOTE — PROGRESS NOTE ADULT - ASSESSMENT
89 year old man with melena found to have tumor in the ascending colon. Biopsy shows invasive adenocarcinoma. Staging studies shows isolated lesion. s/p resection.

## 2017-11-22 LAB
ANION GAP SERPL CALC-SCNC: 10 MMOL/L — SIGNIFICANT CHANGE UP (ref 5–17)
BUN SERPL-MCNC: 15 MG/DL — SIGNIFICANT CHANGE UP (ref 7–23)
CALCIUM SERPL-MCNC: 8.3 MG/DL — LOW (ref 8.4–10.5)
CHLORIDE SERPL-SCNC: 104 MMOL/L — SIGNIFICANT CHANGE UP (ref 96–108)
CO2 SERPL-SCNC: 22 MMOL/L — SIGNIFICANT CHANGE UP (ref 22–31)
CREAT SERPL-MCNC: 1.08 MG/DL — SIGNIFICANT CHANGE UP (ref 0.5–1.3)
GLUCOSE SERPL-MCNC: 136 MG/DL — HIGH (ref 70–99)
HCT VFR BLD CALC: 34 % — LOW (ref 39–50)
HGB BLD-MCNC: 10.7 G/DL — LOW (ref 13–17)
MCHC RBC-ENTMCNC: 27.5 PG — SIGNIFICANT CHANGE UP (ref 27–34)
MCHC RBC-ENTMCNC: 31.5 GM/DL — LOW (ref 32–36)
MCV RBC AUTO: 87.4 FL — SIGNIFICANT CHANGE UP (ref 80–100)
PLATELET # BLD AUTO: 300 K/UL — SIGNIFICANT CHANGE UP (ref 150–400)
POTASSIUM SERPL-MCNC: 5.1 MMOL/L — SIGNIFICANT CHANGE UP (ref 3.5–5.3)
POTASSIUM SERPL-SCNC: 5.1 MMOL/L — SIGNIFICANT CHANGE UP (ref 3.5–5.3)
RBC # BLD: 3.89 M/UL — LOW (ref 4.2–5.8)
RBC # FLD: 16.8 % — HIGH (ref 10.3–14.5)
SODIUM SERPL-SCNC: 136 MMOL/L — SIGNIFICANT CHANGE UP (ref 135–145)
WBC # BLD: 13.77 K/UL — HIGH (ref 3.8–10.5)
WBC # FLD AUTO: 13.77 K/UL — HIGH (ref 3.8–10.5)

## 2017-11-22 RX ORDER — ASPIRIN/CALCIUM CARB/MAGNESIUM 324 MG
81 TABLET ORAL DAILY
Qty: 0 | Refills: 0 | Status: DISCONTINUED | OUTPATIENT
Start: 2017-11-22 | End: 2017-11-22

## 2017-11-22 RX ORDER — SODIUM CHLORIDE 9 MG/ML
500 INJECTION, SOLUTION INTRAVENOUS
Qty: 0 | Refills: 0 | Status: DISCONTINUED | OUTPATIENT
Start: 2017-11-22 | End: 2017-11-25

## 2017-11-22 RX ORDER — ASPIRIN/CALCIUM CARB/MAGNESIUM 324 MG
1 TABLET ORAL
Qty: 0 | Refills: 0 | COMMUNITY

## 2017-11-22 RX ORDER — FUROSEMIDE 40 MG
20 TABLET ORAL DAILY
Qty: 0 | Refills: 0 | Status: DISCONTINUED | OUTPATIENT
Start: 2017-11-22 | End: 2017-11-23

## 2017-11-22 RX ORDER — ASPIRIN/CALCIUM CARB/MAGNESIUM 324 MG
81 TABLET ORAL DAILY
Qty: 0 | Refills: 0 | Status: DISCONTINUED | OUTPATIENT
Start: 2017-11-22 | End: 2017-11-28

## 2017-11-22 RX ORDER — HYDROMORPHONE HYDROCHLORIDE 2 MG/ML
0.25 INJECTION INTRAMUSCULAR; INTRAVENOUS; SUBCUTANEOUS EVERY 4 HOURS
Qty: 0 | Refills: 0 | Status: DISCONTINUED | OUTPATIENT
Start: 2017-11-22 | End: 2017-11-23

## 2017-11-22 RX ORDER — ASPIRIN/CALCIUM CARB/MAGNESIUM 325 MG
325 TABLET ORAL DAILY
Qty: 0 | Refills: 0 | Status: DISCONTINUED | OUTPATIENT
Start: 2017-11-22 | End: 2017-11-22

## 2017-11-22 RX ORDER — ASPIRIN/CALCIUM CARB/MAGNESIUM 324 MG
325 TABLET ORAL DAILY
Qty: 0 | Refills: 0 | Status: DISCONTINUED | OUTPATIENT
Start: 2017-11-22 | End: 2017-11-22

## 2017-11-22 RX ADMIN — Medication 400 MILLIGRAM(S): at 01:06

## 2017-11-22 RX ADMIN — ATORVASTATIN CALCIUM 40 MILLIGRAM(S): 80 TABLET, FILM COATED ORAL at 22:14

## 2017-11-22 RX ADMIN — Medication 1000 MILLIGRAM(S): at 01:40

## 2017-11-22 RX ADMIN — Medication 20 MILLIGRAM(S): at 12:46

## 2017-11-22 RX ADMIN — Medication 1 TABLET(S): at 12:46

## 2017-11-22 RX ADMIN — ENOXAPARIN SODIUM 40 MILLIGRAM(S): 100 INJECTION SUBCUTANEOUS at 12:46

## 2017-11-22 RX ADMIN — Medication 1000 MILLIGRAM(S): at 06:00

## 2017-11-22 RX ADMIN — CARVEDILOL PHOSPHATE 6.25 MILLIGRAM(S): 80 CAPSULE, EXTENDED RELEASE ORAL at 18:35

## 2017-11-22 RX ADMIN — ISOSORBIDE MONONITRATE 30 MILLIGRAM(S): 60 TABLET, EXTENDED RELEASE ORAL at 12:46

## 2017-11-22 RX ADMIN — CARVEDILOL PHOSPHATE 6.25 MILLIGRAM(S): 80 CAPSULE, EXTENDED RELEASE ORAL at 05:57

## 2017-11-22 RX ADMIN — Medication 500 MILLIGRAM(S): at 12:46

## 2017-11-22 RX ADMIN — Medication 400 MILLIGRAM(S): at 05:56

## 2017-11-22 NOTE — CONSULT NOTE ADULT - ASSESSMENT
89M multiple vascular risk factors presents with lower GIB found to have colon cancer, s/p resection, post-op reporting vivid visual hallucinations including seeing rain in the room (light, heavy, "down pour") and sensation of travelling through a tunnel when his eyes are closed.     Dx: Post-operative delirium/psychosis.    1. Likely with contribution from anesthesia exposure, narcotics and insomnia    2. As patient retains insight, I do not feel that these need to be treated. Will likely resolve on their own.     3. If he loses insight or family/patient request they be treated, would consult psychiatry for management of neuroleptics.     4. Frequent re-orientation, move to window bed if possible, avoid delirium provoking agents and avoid narcotic pain control when possible    5. Discussed with neuro resident as well as patient's daughters at bedside. Discussed with patient and nursing. Otherwise no further inpatient neurologic intervention at this time.

## 2017-11-22 NOTE — PROGRESS NOTE ADULT - SUBJECTIVE AND OBJECTIVE BOX
STATUS POST:  Right hemicolectomy with anastomosis of ileum to colon      POST OPERATIVE DAY #: 2    Vital Signs Last 24 Hrs  T(C): 36.6 (22 Nov 2017 05:32), Max: 36.6 (22 Nov 2017 05:32)  T(F): 97.8 (22 Nov 2017 05:32), Max: 97.8 (22 Nov 2017 05:32)  HR: 75 (22 Nov 2017 05:32) (55 - 75)  BP: 155/74 (22 Nov 2017 05:32) (131/59 - 186/83)  BP(mean): --  RR: 18 (22 Nov 2017 05:32) (18 - 18)  SpO2: 97% (22 Nov 2017 05:32) (96% - 98%)    I&O's Summary    21 Nov 2017 07:01  -  22 Nov 2017 07:00  --------------------------------------------------------  IN: 2700 mL / OUT: 700 mL / NET: 2000 mL        SUBJECTIVE: Pt seen and examined. No complaints at this time. No pain. Resting comfortably. Denies N/V. No flatus or BM. Home lasix started for low urine output (700cc for last 24 H).       Physical Exam:  General Appearance: Appears well, NAD  Abdomen: Soft, ND, appropriate incisional tenderness, dressings clean and dry and intact  Extremities: Grossly symmetric, SCD's in place     LABS:                        10.4   13.92 )-----------( 240      ( 21 Nov 2017 11:14 )             33.0     11-21    140  |  104  |  13  ----------------------------<  85  4.6   |  22  |  0.88    Ca    7.8<L>      21 Nov 2017 11:17  Phos  3.3     11-21  Mg     2.0     11-21          Kareen Dooley PA-C  p#[id number]

## 2017-11-22 NOTE — PROGRESS NOTE ADULT - ASSESSMENT
89yo M POD 2 s/p lap R colectomy    -IVF  -Sips  -Home meds (Lasix restarted, Aspirin to be started if H/H stable, Hold Plavix)  -Await GI function  -Pain control  -Monitor U/O  -PT eval  -IS/OOB  -TARA paez

## 2017-11-22 NOTE — CHART NOTE - NSCHARTNOTEFT_GEN_A_CORE
Patient admits to seeing "rain showers." Denies eye pain, and HA. Received Meclizine overnight (which may have up to 20 hours of effects in the elderly). After receiving this medication he had visual changes that occurred when his eyes were closed described as tunnel vision, but that has resolved since the AM.     AVSS.   AAO x 3.   Eyes: Sclera white and clear, No erythema, No FB visualized, B/L Pupils round and reactive, EOMi B/L, visual changes only occurring when both eyes are open, they do not occur when eyes are closed completely or individually.     Avoid anticholinergics. Pain medication decreased. ASA restarted today. Will reach out to Internal Medicine and monitor for worsening of symptoms. Would consider optho consult if this persists or worsens.

## 2017-11-22 NOTE — CONSULT NOTE ADULT - SUBJECTIVE AND OBJECTIVE BOX
Admitting Diagnosis:  Acute posthemorrhagic anemia (D62): ACUTE POSTHEMORRHAGIC ANEMIA      HPI:  This is a 89y year old Male with the below past medical history significant for CAD s/p stents, HTN, HLD, CHF presents with two days of epigastric pain, black bowel movements, found to have mass in the ascending colon s/p resection post op reporting episodes of seeing vivid hallucinations of rain falling in his room and on the bed. He also describes a feeling of travelling through a tunnel when he closes his eyes. Has occurred since POD per the patient but suspect he only recently notified the staff. He understands that these hallucinations are not real. No appendicular weakness, numbness, dysarthria or dysphagia. No headache or changes in hearing. No auditiory hallucinations.       Past Medical History:  HLD (hyperlipidemia) (E78.5)  Hypertension (I10)  CAD (coronary artery disease) (I25.10)      Past Surgical History:  No significant past surgical history ([id number])      Social History:  No toxic habits    Family History:  FAMILY HISTORY:  No pertinent family history in first degree relatives      Allergies:  No Known Allergies      ROS:  Constitutional: Patient offers no complaints of fevers or significant weight loss  Ears, Nose, Mouth and Throat: The patient presents with no abnormalities of the head, ears, eyes, nose or throat  Skin: Patient offers no concerns of new rashes or lesions  Respiratory: The patient presents with no abnormalities of the respiratory tract  Cardiovascular: The patient presents with no cardiac abnormalities  Gastrointestinal: The patient presents with no abnormalities of the GI system  Genitourinary: The patient presents with no dysuria, hematuria or frequent urination  Neurological: See HPI  Endocrine: Patient offers no complaints of excessive thirst, urination, or heat/cold intolerance    Advanced care planning reviewed and noted in the chart.    Medications:  ascorbic acid 500 milliGRAM(s) Oral daily  aspirin enteric coated 81 milliGRAM(s) Oral daily  atorvastatin 40 milliGRAM(s) Oral at bedtime  carvedilol 6.25 milliGRAM(s) Oral every 12 hours  dextrose 5% + sodium chloride 0.45%. 1000 milliLiter(s) IV Continuous <Continuous>  enoxaparin Injectable 40 milliGRAM(s) SubCutaneous daily  furosemide    Tablet 20 milliGRAM(s) Oral daily  HYDROmorphone  Injectable 0.25 milliGRAM(s) IV Push every 4 hours PRN  isosorbide   mononitrate ER Tablet (IMDUR) 30 milliGRAM(s) Oral daily  multivitamin 1 Tablet(s) Oral daily      Labs:  CBC Full  -  ( 22 Nov 2017 07:41 )  WBC Count : 13.77 K/uL  Hemoglobin : 10.7 g/dL  Hematocrit : 34.0 %  Platelet Count - Automated : 300 K/uL  Mean Cell Volume : 87.4 fl  Mean Cell Hemoglobin : 27.5 pg  Mean Cell Hemoglobin Concentration : 31.5 gm/dL  Auto Neutrophil # : x  Auto Lymphocyte # : x  Auto Monocyte # : x  Auto Eosinophil # : x  Auto Basophil # : x  Auto Neutrophil % : x  Auto Lymphocyte % : x  Auto Monocyte % : x  Auto Eosinophil % : x  Auto Basophil % : x    11-22    136  |  104  |  15  ----------------------------<  136<H>  5.1   |  22  |  1.08    Ca    8.3<L>      22 Nov 2017 07:39  Phos  3.3     11-21  Mg     2.0     11-21      CAPILLARY BLOOD GLUCOSE              Male    Vitals:  Vital Signs Last 24 Hrs  T(C): 36.8 (22 Nov 2017 14:09), Max: 36.8 (22 Nov 2017 14:09)  T(F): 98.2 (22 Nov 2017 14:09), Max: 98.2 (22 Nov 2017 14:09)  HR: 79 (22 Nov 2017 14:09) (55 - 79)  BP: 149/73 (22 Nov 2017 14:09) (131/59 - 168/80)  BP(mean): --  RR: 18 (22 Nov 2017 14:09) (18 - 18)  SpO2: 95% (22 Nov 2017 14:09) (95% - 98%)    NEUROLOGICAL EXAM:    Mental status: Awake, alert, and in no apparent distress. Oriented to person, place and time. Language function is normal. Recent memory, digit span and concentration were normal.     Cranial Nerves: Pupils were equal, round, reactive to light. Extraocular movements were intact. Visual field were full. Fundoscopic exam was deferred. Facial sensation was intact to light touch. There was no facial asymmetry. The palate was upgoing symmetrically and tongue was midline. Hearing acuity was intact to finger rub AU. Shoulder shrug was full bilaterally    Motor exam: Bulk and tone were normal. Strength was 5/5 in all four extremities. Fine finger movements were symmetric and normal. There was no pronator drift    Reflexes: Absent in the bilateral upper extremities. Absent in the bilateral lower extremities. Toes were downgoing bilaterally.     Sensation: Intact to light touch    Coordination: Finger-nose-finger and heel-to-shin was without dysmetria.     Gait: Narrow base and steady.

## 2017-11-22 NOTE — PROGRESS NOTE ADULT - SUBJECTIVE AND OBJECTIVE BOX
Patient is a 89y old  Male who presents with a chief complaint of generalized abdominal pain associated with melena found to have (21 Nov 2017 10:39)      SUBJECTIVE / OVERNIGHT EVENTS:  Feeling better today. Pain stable.  No overnight event but complaints of seeing "rains" in his room. Likely post op delirium.  Now resolved.   Chest pain free. no SOB, no N/V/D.  Denied HA/dizziness. Post surgical abdominal pain well controlled.   + Flatus.   Sitting up. awake, alert.       Vital Signs Last 24 Hrs  T(C): 36.9 (22 Nov 2017 21:14), Max: 37 (22 Nov 2017 17:39)  T(F): 98.5 (22 Nov 2017 21:14), Max: 98.6 (22 Nov 2017 17:39)  HR: 79 (22 Nov 2017 21:14) (74 - 99)  BP: 146/77 (22 Nov 2017 21:14) (141/69 - 164/66)  BP(mean): --  RR: 18 (22 Nov 2017 21:14) (18 - 18)  SpO2: 95% (22 Nov 2017 21:14) (95% - 97%)  I&O's Summary    21 Nov 2017 07:01  -  22 Nov 2017 07:00  --------------------------------------------------------  IN: 2700 mL / OUT: 700 mL / NET: 2000 mL    22 Nov 2017 07:01  -  22 Nov 2017 22:01  --------------------------------------------------------  IN: 1350 mL / OUT: 850 mL / NET: 500 mL        PHYSICAL EXAM:  GENERAL: NAD, Comfortable  HEAD:  Atraumatic, Normocephalic  EYES: EOMI, PERRLA, conjunctiva and sclera clear  NECK: Supple, No JVD  CHEST/LUNG: Clear to auscultation bilaterally; No wheeze  HEART: Regular rate and rhythm; No murmurs, rubs, or gallops  ABDOMEN: Soft, post surgical tenderness, Nondistended; Bowel sounds present  Neuro: AAO x 3, no focal deficit, 5/5 b/l extremities  EXTREMITIES:  2+ Peripheral Pulses, No clubbing, cyanosis, or edema  SKIN: No rashes or lesions    LABS:                        10.7   13.77 )-----------( 300      ( 22 Nov 2017 07:41 )             34.0     11-22    136  |  104  |  15  ----------------------------<  136<H>  5.1   |  22  |  1.08    Ca    8.3<L>      22 Nov 2017 07:39  Phos  3.3     11-21  Mg     2.0     11-21        CAPILLARY BLOOD GLUCOSE                RADIOLOGY & ADDITIONAL TESTS:    Imaging Personally Reviewed:  [x] YES  [ ] NO    Consultant(s) Notes Reviewed:  [x] YES  [ ] NO      MEDICATIONS  (STANDING):  ascorbic acid 500 milliGRAM(s) Oral daily  aspirin enteric coated 81 milliGRAM(s) Oral daily  atorvastatin 40 milliGRAM(s) Oral at bedtime  carvedilol 6.25 milliGRAM(s) Oral every 12 hours  dextrose 5% + sodium chloride 0.45%. 500 milliLiter(s) (50 mL/Hr) IV Continuous <Continuous>  enoxaparin Injectable 40 milliGRAM(s) SubCutaneous daily  furosemide    Tablet 20 milliGRAM(s) Oral daily  isosorbide   mononitrate ER Tablet (IMDUR) 30 milliGRAM(s) Oral daily  multivitamin 1 Tablet(s) Oral daily    MEDICATIONS  (PRN):  HYDROmorphone  Injectable 0.25 milliGRAM(s) IV Push every 4 hours PRN Moderate Pain (4 - 6)      Care Discussed with Consultants/Other Providers [x] YES  [ ] NO    HEALTH ISSUES - PROBLEM Dx:  Preoperative cardiovascular examination: Preoperative cardiovascular examination  Colon adenocarcinoma: Colon adenocarcinoma  Nutrition, metabolism, and development symptoms: Nutrition, metabolism, and development symptoms  Prophylactic measure: Prophylactic measure  Essential hypertension: Essential hypertension  HLD (hyperlipidemia): HLD (hyperlipidemia)  CAD (coronary artery disease): CAD (coronary artery disease)  Anemia: Anemia  Epigastric abdominal pain: Epigastric abdominal pain  Melena: Melena

## 2017-11-22 NOTE — PROGRESS NOTE ADULT - PROBLEM SELECTOR PLAN 1
-- GI and surgery eval appreciated.   -- hgb remains relatively stable.    -- s/p EGD and Colonoscopy 11/13 which shows ascending colon mass  -- Biopsy shows adenocarcinoma.   -- CT abdomen/chest shows no evidence of metastasis.  -- Oncology Dr. Kothari consult appreciated.  -- Surg-onc Dr Flores consulted  -- Pt s/p successful laparoscopic right colectomy POD # 2.   -- Started on clear liquid diet today.   -- post op delirium, resolving. monitor.     ** caution with IVF in 89 year old. Once GI function returns, IVF should be decreased and home Lasix to be restarted.

## 2017-11-23 LAB
ANION GAP SERPL CALC-SCNC: 9 MMOL/L — SIGNIFICANT CHANGE UP (ref 5–17)
BUN SERPL-MCNC: 11 MG/DL — SIGNIFICANT CHANGE UP (ref 7–23)
CALCIUM SERPL-MCNC: 8.1 MG/DL — LOW (ref 8.4–10.5)
CHLORIDE SERPL-SCNC: 102 MMOL/L — SIGNIFICANT CHANGE UP (ref 96–108)
CO2 SERPL-SCNC: 26 MMOL/L — SIGNIFICANT CHANGE UP (ref 22–31)
CREAT SERPL-MCNC: 1.14 MG/DL — SIGNIFICANT CHANGE UP (ref 0.5–1.3)
GLUCOSE SERPL-MCNC: 121 MG/DL — HIGH (ref 70–99)
HCT VFR BLD CALC: 34.4 % — LOW (ref 39–50)
HGB BLD-MCNC: 10.7 G/DL — LOW (ref 13–17)
MAGNESIUM SERPL-MCNC: 2 MG/DL — SIGNIFICANT CHANGE UP (ref 1.6–2.6)
MCHC RBC-ENTMCNC: 27.5 PG — SIGNIFICANT CHANGE UP (ref 27–34)
MCHC RBC-ENTMCNC: 31.1 GM/DL — LOW (ref 32–36)
MCV RBC AUTO: 88.4 FL — SIGNIFICANT CHANGE UP (ref 80–100)
PHOSPHATE SERPL-MCNC: 2.2 MG/DL — LOW (ref 2.5–4.5)
PLATELET # BLD AUTO: 287 K/UL — SIGNIFICANT CHANGE UP (ref 150–400)
POTASSIUM SERPL-MCNC: 4.7 MMOL/L — SIGNIFICANT CHANGE UP (ref 3.5–5.3)
POTASSIUM SERPL-SCNC: 4.7 MMOL/L — SIGNIFICANT CHANGE UP (ref 3.5–5.3)
RBC # BLD: 3.89 M/UL — LOW (ref 4.2–5.8)
RBC # FLD: 16.5 % — HIGH (ref 10.3–14.5)
SODIUM SERPL-SCNC: 137 MMOL/L — SIGNIFICANT CHANGE UP (ref 135–145)
WBC # BLD: 11.91 K/UL — HIGH (ref 3.8–10.5)
WBC # FLD AUTO: 11.91 K/UL — HIGH (ref 3.8–10.5)

## 2017-11-23 RX ORDER — HYDRALAZINE HCL 50 MG
10 TABLET ORAL ONCE
Qty: 0 | Refills: 0 | Status: COMPLETED | OUTPATIENT
Start: 2017-11-23 | End: 2017-11-23

## 2017-11-23 RX ORDER — LISINOPRIL 2.5 MG/1
5 TABLET ORAL DAILY
Qty: 0 | Refills: 0 | Status: DISCONTINUED | OUTPATIENT
Start: 2017-11-23 | End: 2017-11-28

## 2017-11-23 RX ORDER — OXYCODONE HYDROCHLORIDE 5 MG/1
2.5 TABLET ORAL EVERY 4 HOURS
Qty: 0 | Refills: 0 | Status: DISCONTINUED | OUTPATIENT
Start: 2017-11-23 | End: 2017-11-28

## 2017-11-23 RX ORDER — ACETAMINOPHEN 500 MG
650 TABLET ORAL EVERY 4 HOURS
Qty: 0 | Refills: 0 | Status: DISCONTINUED | OUTPATIENT
Start: 2017-11-23 | End: 2017-11-28

## 2017-11-23 RX ORDER — AMLODIPINE BESYLATE 2.5 MG/1
2.5 TABLET ORAL DAILY
Qty: 0 | Refills: 0 | Status: DISCONTINUED | OUTPATIENT
Start: 2017-11-23 | End: 2017-11-28

## 2017-11-23 RX ADMIN — ENOXAPARIN SODIUM 40 MILLIGRAM(S): 100 INJECTION SUBCUTANEOUS at 11:48

## 2017-11-23 RX ADMIN — CARVEDILOL PHOSPHATE 6.25 MILLIGRAM(S): 80 CAPSULE, EXTENDED RELEASE ORAL at 17:56

## 2017-11-23 RX ADMIN — ATORVASTATIN CALCIUM 40 MILLIGRAM(S): 80 TABLET, FILM COATED ORAL at 22:25

## 2017-11-23 RX ADMIN — CARVEDILOL PHOSPHATE 6.25 MILLIGRAM(S): 80 CAPSULE, EXTENDED RELEASE ORAL at 06:20

## 2017-11-23 RX ADMIN — LISINOPRIL 5 MILLIGRAM(S): 2.5 TABLET ORAL at 17:55

## 2017-11-23 RX ADMIN — Medication 81 MILLIGRAM(S): at 11:49

## 2017-11-23 RX ADMIN — Medication 20 MILLIGRAM(S): at 06:20

## 2017-11-23 RX ADMIN — AMLODIPINE BESYLATE 2.5 MILLIGRAM(S): 2.5 TABLET ORAL at 11:48

## 2017-11-23 RX ADMIN — Medication 1 TABLET(S): at 11:48

## 2017-11-23 RX ADMIN — Medication 10 MILLIGRAM(S): at 10:46

## 2017-11-23 RX ADMIN — SODIUM CHLORIDE 50 MILLILITER(S): 9 INJECTION, SOLUTION INTRAVENOUS at 22:25

## 2017-11-23 RX ADMIN — Medication 500 MILLIGRAM(S): at 11:48

## 2017-11-23 RX ADMIN — ISOSORBIDE MONONITRATE 30 MILLIGRAM(S): 60 TABLET, EXTENDED RELEASE ORAL at 11:48

## 2017-11-23 NOTE — PROGRESS NOTE ADULT - SUBJECTIVE AND OBJECTIVE BOX
Patient seen and evaluated @  bedside  Chief Complaint: Hypertension, Hx CAD    HPI:  Patient is 4 days post R hemicolectomy forcarcinoma presently with melena and Fe deficiency anemia.  BP elevated this AM.  Issue regarding use of anti-platelet agents was discussed with house staff yesterday and patient placed back on ASA 81 mg and no need for plavix at this time.  BP this -180 and has been elevated but less the past couple of days.  When last seen by me about 2 years ago he had been on amlodipine 5 mg qd and ramipril 5mg qd.  These were discontiued by his physician in [place] since that time.    He has history of CAD s/p CABG, stents (last 15 years ago) on ASA/plavix, HTN, HLD,     PMH:   HLD (hyperlipidemia)  Hypertension  CAD (coronary artery disease)  Carotid artery disease  [place] hepatits in 30s  Skin cancer S/P resection    PSH:   CABG in 60s  Right carotid endarterectomy  2004  BL cataract surgery    Medications:   acetaminophen   Tablet. 650 milliGRAM(s) Oral every 4 hours PRN  amLODIPine   Tablet 2.5 milliGRAM(s) Oral daily  ascorbic acid 500 milliGRAM(s) Oral daily  aspirin enteric coated 81 milliGRAM(s) Oral daily  atorvastatin 40 milliGRAM(s) Oral at bedtime  carvedilol 6.25 milliGRAM(s) Oral every 12 hours  dextrose 5% + sodium chloride 0.45%. 500 milliLiter(s) IV Continuous <Continuous>  enoxaparin Injectable 40 milliGRAM(s) SubCutaneous daily  isosorbide   mononitrate ER Tablet (IMDUR) 30 milliGRAM(s) Oral daily  multivitamin 1 Tablet(s) Oral daily  oxyCODONE    IR 2.5 milliGRAM(s) Oral every 4 hours PRN    Allergies:  No Known Allergies    FAMILY HISTORY:  No pertinent family history in first degree relatives    Social History:  Smoking:  Former      REVIEW OF SYSTEMS:  CCARDIOVASCULAR: No chest pain, dyspnea  or palpitationsG    Physical Exam:  T(C): 36.8 (11-23-17 @ 09:32), Max: 37 (11-22-17 @ 17:39)  HR: 81 (11-23-17 @ 09:32) (79 - 99)  BP: 180/71 (11-23-17 @ 09:32) (146/77 - 180/71)  RR: 18 (11-23-17 @ 09:32) (18 - 18)  SpO2: 100% (11-23-17 @ 09:32) (95% - 100%)  Wt(kg): --    11-22 @ 07:01  -  11-23 @ 07:00  --------------------------------------------------------  IN: 1950 mL / OUT: 1250 mL / NET: 700 mL    11-23 @ 07:01  -  11-23 @ 11:35  --------------------------------------------------------  IN: 0 mL / OUT: 100 mL / NET: -100 mL      Daily     Daily     Appearance:  Normal, NAD  Eyes:  PERRL, EOMI  HEENT: Normal oral [place], NC/AT  Neck:  No JVD or HJR.  Left carotid decreased pulse with bruit  Respiratory: Clear to auscultation bilaterally  Cardiovascular: Normal S1 and S2 with soft systolic murmur LSB.  No rubs or gallops  Abdomen:   BS hypoactive, soft  Extremities: Without edema      Labs:    Complete Blood Count (11.23.17 @ 09:54)    WBC Count: 11.91 K/uL    RBC Count: 3.89 M/uL    Hemoglobin: 10.7 g/dL    Hematocrit: 34.4 %    Mean Cell Volume: 88.4 fl    Mean Cell Hemoglobin: 27.5 pg    Mean Cell Hemoglobin Conc: 31.1 gm/dL    Red Cell Distrib Width: 16.5 %    Platelet Count - Automated: 287 K/uL                            10.7   11.91 )-----------( 287      ( 23 Nov 2017 09:54 )             34.4     11-23    137  |  102  |  11  ----------------------------<  121<H>  4.7   |  26  |  1.14    Ca    8.1<L>      23 Nov 2017 09:54  Phos  2.2     11-23  Mg     2.0     11-23

## 2017-11-23 NOTE — PROGRESS NOTE ADULT - ASSESSMENT
89M s/p lap R colectomy    -CLD  -Home meds, hold plavix  -F/u GI function  -Pain control  -Monitor UOP  -IS/OOB

## 2017-11-23 NOTE — PROGRESS NOTE ADULT - SUBJECTIVE AND OBJECTIVE BOX
Patient is a 89y old  Male who presents with a chief complaint of generalized abdominal pain associated with melena found to have (21 Nov 2017 10:39)      SUBJECTIVE / OVERNIGHT EVENTS:  No overnight events.  No complaints. No Flatus/no BM.   No cp, sob, stable post surgical abdominal pain.  No n/v/d. no HA/dizziness.   Tolerating clears diet.   On IVF. Hypertensive. Started on Norvasc.       Vital Signs Last 24 Hrs  T(C): 36.8 (23 Nov 2017 14:24), Max: 37 (22 Nov 2017 17:39)  T(F): 98.3 (23 Nov 2017 14:24), Max: 98.6 (22 Nov 2017 17:39)  HR: 80 (23 Nov 2017 14:24) (79 - 99)  BP: 182/77 (23 Nov 2017 14:24) (146/77 - 196/91)  BP(mean): --  RR: 18 (23 Nov 2017 14:24) (18 - 18)  SpO2: 94% (23 Nov 2017 14:24) (94% - 100%)  I&O's Summary    22 Nov 2017 07:01  -  23 Nov 2017 07:00  --------------------------------------------------------  IN: 1950 mL / OUT: 1250 mL / NET: 700 mL    23 Nov 2017 07:01  -  23 Nov 2017 16:37  --------------------------------------------------------  IN: 0 mL / OUT: 800 mL / NET: -800 mL        PHYSICAL EXAM:  GENERAL: NAD, Comfortable  HEAD:  Atraumatic, Normocephalic  EYES: EOMI, PERRLA, conjunctiva and sclera clear  NECK: Supple, No JVD  CHEST/LUNG: Clear to auscultation bilaterally; No wheeze  HEART: Regular rate and rhythm; No murmurs, rubs, or gallops  ABDOMEN: Soft, mild tender post surgically, Nondistended; Bowel sounds present  Neuro: AAO x 3, no focal deficit, 5/5 b/l extremities  EXTREMITIES:  2+ Peripheral Pulses, No clubbing, cyanosis, or edema  SKIN: No rashes or lesions    LABS:                        10.7   11.91 )-----------( 287      ( 23 Nov 2017 09:54 )             34.4     11-23    137  |  102  |  11  ----------------------------<  121<H>  4.7   |  26  |  1.14    Ca    8.1<L>      23 Nov 2017 09:54  Phos  2.2     11-23  Mg     2.0     11-23        CAPILLARY BLOOD GLUCOSE                RADIOLOGY & ADDITIONAL TESTS:    Imaging Personally Reviewed:  [x] YES  [ ] NO    Consultant(s) Notes Reviewed:  [x] YES  [ ] NO      MEDICATIONS  (STANDING):  amLODIPine   Tablet 2.5 milliGRAM(s) Oral daily  ascorbic acid 500 milliGRAM(s) Oral daily  aspirin enteric coated 81 milliGRAM(s) Oral daily  atorvastatin 40 milliGRAM(s) Oral at bedtime  carvedilol 6.25 milliGRAM(s) Oral every 12 hours  dextrose 5% + sodium chloride 0.45%. 500 milliLiter(s) (50 mL/Hr) IV Continuous <Continuous>  enoxaparin Injectable 40 milliGRAM(s) SubCutaneous daily  isosorbide   mononitrate ER Tablet (IMDUR) 30 milliGRAM(s) Oral daily  multivitamin 1 Tablet(s) Oral daily    MEDICATIONS  (PRN):  acetaminophen   Tablet. 650 milliGRAM(s) Oral every 4 hours PRN Mild Pain (1 - 3)  oxyCODONE    IR 2.5 milliGRAM(s) Oral every 4 hours PRN Moderate Pain (4 - 6)      Care Discussed with Consultants/Other Providers [x] YES  [ ] NO    HEALTH ISSUES - PROBLEM Dx:  Preoperative cardiovascular examination: Preoperative cardiovascular examination  Colon adenocarcinoma: Colon adenocarcinoma  Nutrition, metabolism, and development symptoms: Nutrition, metabolism, and development symptoms  Prophylactic measure: Prophylactic measure  Essential hypertension: Essential hypertension  HLD (hyperlipidemia): HLD (hyperlipidemia)  CAD (coronary artery disease): CAD (coronary artery disease)  Anemia: Anemia  Epigastric abdominal pain: Epigastric abdominal pain  Melena: Melena

## 2017-11-23 NOTE — PROGRESS NOTE ADULT - SUBJECTIVE AND OBJECTIVE BOX
GENERAL SURGERY DAILY PROGRESS NOTE:       Subjective:  Pt confused yesterday and seen by neurologyMoreno OGLESBY. Pt evaluated at bedside this am. Pt denies passing flatus and reports he has been OOB. Pt denies N/V and reports that his pain has been improving.         Objective:    PE:  General Appearance: Appears well, NAD  Abdomen: Soft, ND, appropriate incisional tenderness, dressings clean and dry and intact  Extremities: Grossly symmetric, SCD's in place     Vital Signs Last 24 Hrs  T(C): 36.8 (23 Nov 2017 09:32), Max: 37 (22 Nov 2017 17:39)  T(F): 98.2 (23 Nov 2017 09:32), Max: 98.6 (22 Nov 2017 17:39)  HR: 81 (23 Nov 2017 09:32) (79 - 99)  BP: 180/71 (23 Nov 2017 09:32) (146/77 - 180/71)  BP(mean): --  RR: 18 (23 Nov 2017 09:32) (18 - 18)  SpO2: 100% (23 Nov 2017 09:32) (95% - 100%)    I&O's Detail    22 Nov 2017 07:01  -  23 Nov 2017 07:00  --------------------------------------------------------  IN:    dextrose 5% + sodium chloride 0.45%.: 1950 mL  Total IN: 1950 mL    OUT:    Indwelling Catheter - Urethral: 50 mL    Voided: 1200 mL  Total OUT: 1250 mL    Total NET: 700 mL      23 Nov 2017 07:01  -  23 Nov 2017 10:47  --------------------------------------------------------  IN:  Total IN: 0 mL    OUT:    Voided: 100 mL  Total OUT: 100 mL    Total NET: -100 mL          Daily     Daily     MEDICATIONS  (STANDING):  ascorbic acid 500 milliGRAM(s) Oral daily  aspirin enteric coated 81 milliGRAM(s) Oral daily  atorvastatin 40 milliGRAM(s) Oral at bedtime  carvedilol 6.25 milliGRAM(s) Oral every 12 hours  dextrose 5% + sodium chloride 0.45%. 500 milliLiter(s) (50 mL/Hr) IV Continuous <Continuous>  enoxaparin Injectable 40 milliGRAM(s) SubCutaneous daily  furosemide    Tablet 20 milliGRAM(s) Oral daily  isosorbide   mononitrate ER Tablet (IMDUR) 30 milliGRAM(s) Oral daily  multivitamin 1 Tablet(s) Oral daily    MEDICATIONS  (PRN):  HYDROmorphone  Injectable 0.25 milliGRAM(s) IV Push every 4 hours PRN Moderate Pain (4 - 6)      LABS:                        10.7   13.77 )-----------( 300      ( 22 Nov 2017 07:41 )             34.0     11-22    136  |  104  |  15  ----------------------------<  136<H>  5.1   |  22  |  1.08    Ca    8.3<L>      22 Nov 2017 07:39  Phos  3.3     11-21  Mg     2.0     11-21            RADIOLOGY & ADDITIONAL STUDIES:

## 2017-11-23 NOTE — PROGRESS NOTE ADULT - ASSESSMENT
Stable CAD without symptroms post-op.  Hypertension.    Plan: Re-add amlodipine 2.5 mg (lower naseem usual prior dose which was 5 mg).          Follow BP and may need to increase dose or re-add Ramipril.

## 2017-11-23 NOTE — PROGRESS NOTE ADULT - PROBLEM SELECTOR PLAN 1
-- GI and surgery eval appreciated.   -- hgb remains relatively stable.    -- s/p EGD and Colonoscopy 11/13 which shows ascending colon mass  -- Biopsy shows adenocarcinoma.   -- CT abdomen/chest shows no evidence of metastasis.  -- Oncology Dr. Kothari consult appreciated.  -- Surg-onc Dr Flores consulted  -- Pt s/p successful laparoscopic right colectomy POD # 3.  -- Tolerating clear liquid diet. Monitor for BM and flatus.  -- post op delirium, resolving. monitor.     ** caution with IVF in 89 year old. Once GI function returns, IVF should be decreased and home Lasix to be restarted.

## 2017-11-24 LAB
ANION GAP SERPL CALC-SCNC: 8 MMOL/L — SIGNIFICANT CHANGE UP (ref 5–17)
BUN SERPL-MCNC: 8 MG/DL — SIGNIFICANT CHANGE UP (ref 7–23)
CALCIUM SERPL-MCNC: 8.6 MG/DL — SIGNIFICANT CHANGE UP (ref 8.4–10.5)
CHLORIDE SERPL-SCNC: 103 MMOL/L — SIGNIFICANT CHANGE UP (ref 96–108)
CO2 SERPL-SCNC: 25 MMOL/L — SIGNIFICANT CHANGE UP (ref 22–31)
CREAT SERPL-MCNC: 0.87 MG/DL — SIGNIFICANT CHANGE UP (ref 0.5–1.3)
GLUCOSE SERPL-MCNC: 109 MG/DL — HIGH (ref 70–99)
HCT VFR BLD CALC: 34.7 % — LOW (ref 39–50)
HGB BLD-MCNC: 10.7 G/DL — LOW (ref 13–17)
MAGNESIUM SERPL-MCNC: 1.9 MG/DL — SIGNIFICANT CHANGE UP (ref 1.6–2.6)
MCHC RBC-ENTMCNC: 27.1 PG — SIGNIFICANT CHANGE UP (ref 27–34)
MCHC RBC-ENTMCNC: 30.8 GM/DL — LOW (ref 32–36)
MCV RBC AUTO: 87.8 FL — SIGNIFICANT CHANGE UP (ref 80–100)
PHOSPHATE SERPL-MCNC: 2.1 MG/DL — LOW (ref 2.5–4.5)
PLATELET # BLD AUTO: 292 K/UL — SIGNIFICANT CHANGE UP (ref 150–400)
POTASSIUM SERPL-MCNC: 4.2 MMOL/L — SIGNIFICANT CHANGE UP (ref 3.5–5.3)
POTASSIUM SERPL-SCNC: 4.2 MMOL/L — SIGNIFICANT CHANGE UP (ref 3.5–5.3)
RBC # BLD: 3.95 M/UL — LOW (ref 4.2–5.8)
RBC # FLD: 16.2 % — HIGH (ref 10.3–14.5)
SODIUM SERPL-SCNC: 136 MMOL/L — SIGNIFICANT CHANGE UP (ref 135–145)
WBC # BLD: 11.94 K/UL — HIGH (ref 3.8–10.5)
WBC # FLD AUTO: 11.94 K/UL — HIGH (ref 3.8–10.5)

## 2017-11-24 RX ORDER — METOPROLOL TARTRATE 50 MG
5 TABLET ORAL ONCE
Qty: 0 | Refills: 0 | Status: COMPLETED | OUTPATIENT
Start: 2017-11-24 | End: 2017-11-24

## 2017-11-24 RX ORDER — SODIUM CHLORIDE 9 MG/ML
1000 INJECTION, SOLUTION INTRAVENOUS ONCE
Qty: 0 | Refills: 0 | Status: COMPLETED | OUTPATIENT
Start: 2017-11-24 | End: 2017-11-24

## 2017-11-24 RX ADMIN — Medication 63.75 MILLIMOLE(S): at 13:45

## 2017-11-24 RX ADMIN — SODIUM CHLORIDE 1000 MILLILITER(S): 9 INJECTION, SOLUTION INTRAVENOUS at 18:51

## 2017-11-24 RX ADMIN — AMLODIPINE BESYLATE 2.5 MILLIGRAM(S): 2.5 TABLET ORAL at 06:13

## 2017-11-24 RX ADMIN — ENOXAPARIN SODIUM 40 MILLIGRAM(S): 100 INJECTION SUBCUTANEOUS at 13:45

## 2017-11-24 RX ADMIN — ATORVASTATIN CALCIUM 40 MILLIGRAM(S): 80 TABLET, FILM COATED ORAL at 21:53

## 2017-11-24 RX ADMIN — ISOSORBIDE MONONITRATE 30 MILLIGRAM(S): 60 TABLET, EXTENDED RELEASE ORAL at 13:45

## 2017-11-24 RX ADMIN — Medication 5 MILLIGRAM(S): at 01:47

## 2017-11-24 RX ADMIN — Medication 500 MILLIGRAM(S): at 13:46

## 2017-11-24 RX ADMIN — LISINOPRIL 5 MILLIGRAM(S): 2.5 TABLET ORAL at 06:13

## 2017-11-24 RX ADMIN — CARVEDILOL PHOSPHATE 6.25 MILLIGRAM(S): 80 CAPSULE, EXTENDED RELEASE ORAL at 06:13

## 2017-11-24 RX ADMIN — Medication 1 TABLET(S): at 13:45

## 2017-11-24 RX ADMIN — SODIUM CHLORIDE 50 MILLILITER(S): 9 INJECTION, SOLUTION INTRAVENOUS at 21:53

## 2017-11-24 RX ADMIN — Medication 81 MILLIGRAM(S): at 13:45

## 2017-11-24 NOTE — PROGRESS NOTE ADULT - SUBJECTIVE AND OBJECTIVE BOX
Patient is a 89y old  Male who presents with a chief complaint of generalized abdominal pain associated with melena found to have (21 Nov 2017 10:39)      SUBJECTIVE / OVERNIGHT EVENTS:  No overnight events.  Pt feels well. Pain is better.  + Flatus and + large BM.   Denied cp, sob, n/v/d.  no abdominal pain. No HA/dizziness.   Tolerating clear diet.   This am, remained hypertensive with systolic 180s but now systolic 120s.     Vital Signs Last 24 Hrs  T(C): 36.3 (24 Nov 2017 13:52), Max: 36.9 (24 Nov 2017 05:52)  T(F): 97.3 (24 Nov 2017 13:52), Max: 98.4 (24 Nov 2017 05:52)  HR: 86 (24 Nov 2017 16:03) (70 - 86)  BP: 123/74 (24 Nov 2017 16:03) (92/56 - 191/77)  BP(mean): --  RR: 18 (24 Nov 2017 16:03) (18 - 18)  SpO2: 97% (24 Nov 2017 16:03) (94% - 100%)  I&O's Summary    23 Nov 2017 07:01  -  24 Nov 2017 07:00  --------------------------------------------------------  IN: 700 mL / OUT: 950 mL / NET: -250 mL    24 Nov 2017 07:01  -  24 Nov 2017 16:23  --------------------------------------------------------  IN: 560 mL / OUT: 100 mL / NET: 460 mL        PHYSICAL EXAM:  GENERAL: NAD, Comfortable  HEAD:  Atraumatic, Normocephalic  EYES: EOMI, PERRLA, conjunctiva and sclera clear  NECK: Supple, No JVD  CHEST/LUNG: Clear to auscultation bilaterally; No wheeze  HEART: Regular rate and rhythm; No murmurs, rubs, or gallops  ABDOMEN: Soft, mildly tender, Nondistended; Bowel sounds present, dressing d/c/i  Neuro: AAO x 3, no focal deficit, 5/5 b/l extremities  EXTREMITIES:  2+ Peripheral Pulses, No clubbing, cyanosis, or edema  SKIN: No rashes or lesions    LABS:                        10.7   11.94 )-----------( 292      ( 24 Nov 2017 08:32 )             34.7     11-24    136  |  103  |  8   ----------------------------<  109<H>  4.2   |  25  |  0.87    Ca    8.6      24 Nov 2017 08:37  Phos  2.1     11-24  Mg     1.9     11-24        CAPILLARY BLOOD GLUCOSE                RADIOLOGY & ADDITIONAL TESTS:    Imaging Personally Reviewed:  [x] YES  [ ] NO    Consultant(s) Notes Reviewed:  [x] YES  [ ] NO      MEDICATIONS  (STANDING):  amLODIPine   Tablet 2.5 milliGRAM(s) Oral daily  ascorbic acid 500 milliGRAM(s) Oral daily  aspirin enteric coated 81 milliGRAM(s) Oral daily  atorvastatin 40 milliGRAM(s) Oral at bedtime  carvedilol 6.25 milliGRAM(s) Oral every 12 hours  dextrose 5% + sodium chloride 0.45%. 500 milliLiter(s) (50 mL/Hr) IV Continuous <Continuous>  enoxaparin Injectable 40 milliGRAM(s) SubCutaneous daily  isosorbide   mononitrate ER Tablet (IMDUR) 30 milliGRAM(s) Oral daily  lisinopril 5 milliGRAM(s) Oral daily  multivitamin 1 Tablet(s) Oral daily    MEDICATIONS  (PRN):  acetaminophen   Tablet. 650 milliGRAM(s) Oral every 4 hours PRN Mild Pain (1 - 3)  oxyCODONE    IR 2.5 milliGRAM(s) Oral every 4 hours PRN Moderate Pain (4 - 6)      Care Discussed with Consultants/Other Providers [x] YES  [ ] NO    HEALTH ISSUES - PROBLEM Dx:  Preoperative cardiovascular examination: Preoperative cardiovascular examination  Colon adenocarcinoma: Colon adenocarcinoma  Nutrition, metabolism, and development symptoms: Nutrition, metabolism, and development symptoms  Prophylactic measure: Prophylactic measure  Essential hypertension: Essential hypertension  HLD (hyperlipidemia): HLD (hyperlipidemia)  CAD (coronary artery disease): CAD (coronary artery disease)  Anemia: Anemia  Epigastric abdominal pain: Epigastric abdominal pain  Melena: Melena

## 2017-11-24 NOTE — PROVIDER CONTACT NOTE (OTHER) - BACKGROUND
11/20 R hemicolectomy. Pt BP was elevated 11/23 afternoon, IV Hydralazine administered and 11/24 morning, IV Lopressor administered.

## 2017-11-24 NOTE — PROGRESS NOTE ADULT - PROBLEM SELECTOR PLAN 2
CT scans shows isolated disease  - s/p laparoscopic resctionon 11/20/2017  - Will follow up pathology once available to discuss if chemotherapy is needed post-operatively with patient and son. Can be done as outpatient  - post-operative care as per surgical oncology and medicine  - Will follow call with questions    Henry Kothari MD  [facility]   [address] [place] [address] [address] [place] [id number]  (T) [phone]  (F) [phone]

## 2017-11-24 NOTE — PROGRESS NOTE ADULT - SUBJECTIVE AND OBJECTIVE BOX
Formerly [facility] Hematology/Oncology - Renee Olivia / Rafael / Taye - [phone]  Primary Outpatient Hematologist/Oncologist: None    Subjective  Patient seen this morning. Doing well. No acute complaints	    ROS:  General:  (-)Pain, (-)Decrease appetite, (-)Fevers, (-)Chills, (-)Weight Loss  Eyes: (-)Blurry Vision, (-)Double Vision, (-)Vision Loss  ENT: (-)Sinus Congestion, (-)Decreased Hearing, (-)Nosebleeds, (-)Sore Throat  Cardiac: (-)Chest Pain, (-)Palpitations, (-)Shortness of breathe on exertion  Respiratory: (-)Cough, (-)hemoptysis, (-)Shortness of breathe  GI: (-)Nausea, (-)Vomiting, (-)Diarrhea, (-)Constipation, (+)Melena, (-)BRBPR  : (-)Hematuria, (-)Dysuria, (-)Polyuia  MSK: (-)Back pain, (-)Joint pain, (-)Stiffness  Dermatology: (-)Rash, (-)Itching  Neurology:  (-)Numbness, (-)Tingling, (-)Difficulty Walking, (-)Tremors, (-)Weakness  Psych: (-)Anxiety, (-)Depression, (-)Memory Loss  Hematology:  (-)Easy Bruising, (-)Easy Bleeding, (-)Night Sweats.     MEDICATIONS  (STANDING):  amLODIPine   Tablet 2.5 milliGRAM(s) Oral daily  ascorbic acid 500 milliGRAM(s) Oral daily  aspirin enteric coated 81 milliGRAM(s) Oral daily  atorvastatin 40 milliGRAM(s) Oral at bedtime  carvedilol 6.25 milliGRAM(s) Oral every 12 hours  dextrose 5% + sodium chloride 0.45%. 500 milliLiter(s) (50 mL/Hr) IV Continuous <Continuous>  enoxaparin Injectable 40 milliGRAM(s) SubCutaneous daily  isosorbide   mononitrate ER Tablet (IMDUR) 30 milliGRAM(s) Oral daily  lisinopril 5 milliGRAM(s) Oral daily  multivitamin 1 Tablet(s) Oral daily    Allergies  No Known Allergies    Vital Signs Last 24 Hrs  T(C): 36.9 (24 Nov 2017 05:52), Max: 36.9 (24 Nov 2017 05:52)  T(F): 98.4 (24 Nov 2017 05:52), Max: 98.4 (24 Nov 2017 05:52)  HR: 81 (24 Nov 2017 07:23) (80 - 84)  BP: 137/75 (24 Nov 2017 07:23) (122/78 - 196/91)  RR: 18 (24 Nov 2017 05:52) (18 - 18)  SpO2: 100% (24 Nov 2017 05:52) (94% - 100%)    Physical Exam:  General: AAO x 3. NAD. Sitting in chair comfortably. Hard of hearing.   HEENT: clear oropharynx, anicteric sclera, pink conjunctivae, EOMi. PERRLA  Cardiac: S1, S2 present. No audible murmurs. RRR  Lungs: CTA B/L.   Abdomen: Soft, Non-Tender, Non-Distended. No palpable hepatosplenomegaly. Healing laparoscopy scars   Extremities: 2+ pulses. No edema  Skin: No Rashes. No petechiae  Neuro: No focal motor or sensory deficits.     Labs:                        10.7   11.91 )-----------( 287      ( 23 Nov 2017 09:54 )             34.4   11-23    137  |  102  |  11  ----------------------------<  121<H>  4.7   |  26  |  1.14    Ca    8.1<L>      23 Nov 2017 09:54  Phos  2.2     11-23  Mg     2.0     11-23    Radiology:  Colonoscopy  Impression:          - Likely malignant partially obstructingtumor in the ascending colon.                        Biopsied. Tattooed.                       - Diverticulosis in the sigmoid colon, in the descending colon, in the                        transverse colon and in the ascending colon.       - The examined portion of the ileum was normal.                       - Internal hemorrhoids.  Recommendation:      - Return patient to [facility] for ongoing care.                       - Full liquid diet today.                       - F/U pathology                       - Continue to hold ASA/Plavix if possible                       - CT chest/ abdomen / pelvis for staging purposes    CT Chest/Abdomen/Pelvis  - Isolated Lesion. No metastatic lesions seen.     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    Note: Dr. Hayes has reviewed this case and concurred the diagnosis.    Verified by: Kristen Dasilva M.D.  (Electronic Signature)  Reported on: 11/14/17 17:57 EST,[address] [place], [place] [id number]

## 2017-11-24 NOTE — PROGRESS NOTE ADULT - PROBLEM SELECTOR PLAN 1
-- resolved.   -- GI and surgery eval appreciated.   -- hgb remains relatively stable.    -- s/p EGD and Colonoscopy 11/13 which shows ascending colon mass  -- Biopsy shows adenocarcinoma.   -- CT abdomen/chest shows no evidence of metastasis.  -- Oncology Dr. Kothari consult appreciated.  -- Surg-onc Dr Flores consulted  -- Pt s/p successful laparoscopic right colectomy POD # 4  -- Tolerating clear liquid diet. Regaining Gi function.   -- post op delirium, resolving. monitor.     ** caution with IVF in 89 year old. Once GI function returns, IVF should be decreased and home Lasix to be restarted.

## 2017-11-24 NOTE — PROGRESS NOTE ADULT - SUBJECTIVE AND OBJECTIVE BOX
GENERAL SURGERY PROGRESS NOTE    SUBJECTIVE: Pt seen at bedside. No acute events o/n. No GI function yet.    Vital Signs Last 24 Hrs  T(C): 36.9 (24 Nov 2017 05:52), Max: 36.9 (24 Nov 2017 05:52)  T(F): 98.4 (24 Nov 2017 05:52), Max: 98.4 (24 Nov 2017 05:52)  HR: 81 (24 Nov 2017 07:23) (80 - 84)  BP: 137/75 (24 Nov 2017 07:23) (122/78 - 196/91)  BP(mean): --  RR: 18 (24 Nov 2017 05:52) (18 - 18)  SpO2: 100% (24 Nov 2017 05:52) (94% - 100%)    Physical Exam  General Appearance: Appears well, NAD  Abdomen: Soft, distended, appropriate incisional tenderness, dressings clean and dry and intact  Extremities: Grossly symmetric, SCD's in place     I&O's Summary    23 Nov 2017 07:01  -  24 Nov 2017 07:00  --------------------------------------------------------  IN: 700 mL / OUT: 950 mL / NET: -250 mL      I&O's Detail    23 Nov 2017 07:01  -  24 Nov 2017 07:00  --------------------------------------------------------  IN:    dextrose 5% + sodium chloride 0.45%.: 600 mL    Oral Fluid: 100 mL  Total IN: 700 mL    OUT:    Voided: 950 mL  Total OUT: 950 mL    Total NET: -250 mL          MEDICATIONS  (STANDING):  amLODIPine   Tablet 2.5 milliGRAM(s) Oral daily  ascorbic acid 500 milliGRAM(s) Oral daily  aspirin enteric coated 81 milliGRAM(s) Oral daily  atorvastatin 40 milliGRAM(s) Oral at bedtime  carvedilol 6.25 milliGRAM(s) Oral every 12 hours  dextrose 5% + sodium chloride 0.45%. 500 milliLiter(s) (50 mL/Hr) IV Continuous <Continuous>  enoxaparin Injectable 40 milliGRAM(s) SubCutaneous daily  isosorbide   mononitrate ER Tablet (IMDUR) 30 milliGRAM(s) Oral daily  lisinopril 5 milliGRAM(s) Oral daily  multivitamin 1 Tablet(s) Oral daily    MEDICATIONS  (PRN):  acetaminophen   Tablet. 650 milliGRAM(s) Oral every 4 hours PRN Mild Pain (1 - 3)  oxyCODONE    IR 2.5 milliGRAM(s) Oral every 4 hours PRN Moderate Pain (4 - 6)      LABS:                        10.7   11.91 )-----------( 287      ( 23 Nov 2017 09:54 )             34.4     11-23    137  |  102  |  11  ----------------------------<  121<H>  4.7   |  26  |  1.14    Ca    8.1<L>      23 Nov 2017 09:54  Phos  2.2     11-23  Mg     2.0     11-23            RADIOLOGY & ADDITIONAL STUDIES: GENERAL SURGERY PROGRESS NOTE    SUBJECTIVE: Pt seen at bedside. Had elevated BP yesterday, was given IV Lopressor. No GI function yet.    Vital Signs Last 24 Hrs  T(C): 36.9 (24 Nov 2017 05:52), Max: 36.9 (24 Nov 2017 05:52)  T(F): 98.4 (24 Nov 2017 05:52), Max: 98.4 (24 Nov 2017 05:52)  HR: 81 (24 Nov 2017 07:23) (80 - 84)  BP: 137/75 (24 Nov 2017 07:23) (122/78 - 196/91)  BP(mean): --  RR: 18 (24 Nov 2017 05:52) (18 - 18)  SpO2: 100% (24 Nov 2017 05:52) (94% - 100%)    Physical Exam  General Appearance: Appears well, NAD  Abdomen: Soft, distended, appropriate incisional tenderness, dressings clean and dry and intact  Extremities: Grossly symmetric, SCD's in place     I&O's Summary    23 Nov 2017 07:01  -  24 Nov 2017 07:00  --------------------------------------------------------  IN: 700 mL / OUT: 950 mL / NET: -250 mL      I&O's Detail    23 Nov 2017 07:01  -  24 Nov 2017 07:00  --------------------------------------------------------  IN:    dextrose 5% + sodium chloride 0.45%.: 600 mL    Oral Fluid: 100 mL  Total IN: 700 mL    OUT:    Voided: 950 mL  Total OUT: 950 mL    Total NET: -250 mL          MEDICATIONS  (STANDING):  amLODIPine   Tablet 2.5 milliGRAM(s) Oral daily  ascorbic acid 500 milliGRAM(s) Oral daily  aspirin enteric coated 81 milliGRAM(s) Oral daily  atorvastatin 40 milliGRAM(s) Oral at bedtime  carvedilol 6.25 milliGRAM(s) Oral every 12 hours  dextrose 5% + sodium chloride 0.45%. 500 milliLiter(s) (50 mL/Hr) IV Continuous <Continuous>  enoxaparin Injectable 40 milliGRAM(s) SubCutaneous daily  isosorbide   mononitrate ER Tablet (IMDUR) 30 milliGRAM(s) Oral daily  lisinopril 5 milliGRAM(s) Oral daily  multivitamin 1 Tablet(s) Oral daily    MEDICATIONS  (PRN):  acetaminophen   Tablet. 650 milliGRAM(s) Oral every 4 hours PRN Mild Pain (1 - 3)  oxyCODONE    IR 2.5 milliGRAM(s) Oral every 4 hours PRN Moderate Pain (4 - 6)      LABS:                        10.7   11.91 )-----------( 287      ( 23 Nov 2017 09:54 )             34.4     11-23    137  |  102  |  11  ----------------------------<  121<H>  4.7   |  26  |  1.14    Ca    8.1<L>      23 Nov 2017 09:54  Phos  2.2     11-23  Mg     2.0     11-23            RADIOLOGY & ADDITIONAL STUDIES:

## 2017-11-24 NOTE — PROVIDER CONTACT NOTE (OTHER) - ACTION/TREATMENT ORDERED:
Give norvasc and recheck BP. MD Batista ordered 5mg Metoprolol IVP. Will reassess vitals in 30 minutes. Will continue to monitor the pt.

## 2017-11-24 NOTE — PROVIDER CONTACT NOTE (OTHER) - ASSESSMENT
Pt A&Ox4. Pt VS /90, HR 84, temp 98.2, SpO2 100% and RR 18. Pt w/ no c/o chest pain, SOB or difficulty breathing. Pt w/ no c/o lightheadedness, dizziness or visual changes. Pt in no pain. Pt resting comfortably in bed in no apparent distress. Pt BP Pt A&Ox4. Pt VS /90, HR 84, temp 98.2, SpO2 100% and RR 18. Pt w/ no c/o chest pain, SOB or difficulty breathing. Pt w/ no c/o lightheadedness, dizziness or visual changes. Pt in no pain. Pt resting comfortably in bed in no apparent distress.

## 2017-11-24 NOTE — PROVIDER CONTACT NOTE (OTHER) - ASSESSMENT
Pt A&Ox4. Pt VS /77, HR 81, temp 98.2, SpO2 100% and RR 18. Pt w/ no c/o chest pain, SOB or difficulty breathing. Pt w/ no c/o lightheadedness, dizziness or visual changes. Pt in no pain. Pt resting comfortably in bed in no apparent distress.

## 2017-11-24 NOTE — PROVIDER CONTACT NOTE (OTHER) - ACTION/TREATMENT ORDERED:
Spoke w/ blue team on phone, stated they were rounding on pt and would be there soon. Pt was assessed @ bedside, team stated to give usual morning htn meds and then reassess vitals in 1 hour.

## 2017-11-24 NOTE — PROVIDER CONTACT NOTE (OTHER) - BACKGROUND
11/20 R hemicolectomy. Pt BP was elevated yesterday afternoon as well. 11/20 R hemicolectomy. Pt BP was elevated yesterday afternoon as well IV Norvasc administered. 11/20 R hemicolectomy. Pt BP was elevated yesterday afternoon as well IV Hydralazine administered.

## 2017-11-24 NOTE — PROGRESS NOTE ADULT - ASSESSMENT
Hypertension.  No symptoms of active CAD.    Plan: Follow BP throughout today.  ? if absorbing all medication.  If elevates would increase dose of lisinopril, then subsequently carvedilol if needed.  Hold off on increasing dose of amlodipine as sometimes can effect bowel motility.

## 2017-11-24 NOTE — PROGRESS NOTE ADULT - SUBJECTIVE AND OBJECTIVE BOX
Hypertension yesterday started on amlodipine and lisinopril.  BP improved but breakthrought early AM for which he received Metoprolol IV.  AFter AM meds, BP now normal.    No c/o pain.  Not yet passing gas and abdomen is distended.        REVIEW OF SYSTEMS:  CARDIOVASCULAR: No chest pain, dyspnea or palpitations  All other review of systems is negative unless indicated above    Medications:  acetaminophen   Tablet. 650 milliGRAM(s) Oral every 4 hours PRN  amLODIPine   Tablet 2.5 milliGRAM(s) Oral daily  ascorbic acid 500 milliGRAM(s) Oral daily  aspirin enteric coated 81 milliGRAM(s) Oral daily  atorvastatin 40 milliGRAM(s) Oral at bedtime  carvedilol 6.25 milliGRAM(s) Oral every 12 hours  dextrose 5% + sodium chloride 0.45%. 500 milliLiter(s) IV Continuous <Continuous>  enoxaparin Injectable 40 milliGRAM(s) SubCutaneous daily  isosorbide   mononitrate ER Tablet (IMDUR) 30 milliGRAM(s) Oral daily  lisinopril 5 milliGRAM(s) Oral daily  multivitamin 1 Tablet(s) Oral daily  oxyCODONE    IR 2.5 milliGRAM(s) Oral every 4 hours PRN      Physical Exam:  Vitals:  T(C): 36.9 (11-24-17 @ 05:52), Max: 36.9 (11-24-17 @ 05:52)  HR: 81 (11-24-17 @ 07:23) (80 - 84)  BP: 137/75 (11-24-17 @ 07:23) (122/78 - 196/91)  BP(mean): --  RR: 18 (11-24-17 @ 05:52) (18 - 18)  SpO2: 100% (11-24-17 @ 05:52) (94% - 100%)  Wt(kg): --  Daily     Daily   I&O's Summary    23 Nov 2017 07:01  -  24 Nov 2017 07:00  --------------------------------------------------------  IN: 700 mL / OUT: 950 mL / NET: -250 mL        Appearance:  Normal, NAD  Eyes:  PERRL, EOMI  HEENT: Normal oral [place], NC/AT  Neck:  No JVD or HJR.   Respiratory: Clear to auscultation bilaterally  Cardiovascular: Normal S1 and S2 with soft systolic murmur LSB.  No rubs or gallops  Abdomen:   BS absent.  Softly distended.  Mild RLQ tnederness to palp  Extremities: Without edema        11-24  pending

## 2017-11-25 LAB
ANION GAP SERPL CALC-SCNC: 7 MMOL/L — SIGNIFICANT CHANGE UP (ref 5–17)
BUN SERPL-MCNC: 10 MG/DL — SIGNIFICANT CHANGE UP (ref 7–23)
CALCIUM SERPL-MCNC: 8.4 MG/DL — SIGNIFICANT CHANGE UP (ref 8.4–10.5)
CHLORIDE SERPL-SCNC: 108 MMOL/L — SIGNIFICANT CHANGE UP (ref 96–108)
CO2 SERPL-SCNC: 23 MMOL/L — SIGNIFICANT CHANGE UP (ref 22–31)
CREAT SERPL-MCNC: 0.95 MG/DL — SIGNIFICANT CHANGE UP (ref 0.5–1.3)
GLUCOSE SERPL-MCNC: 112 MG/DL — HIGH (ref 70–99)
HCT VFR BLD CALC: 31.2 % — LOW (ref 39–50)
HGB BLD-MCNC: 9.7 G/DL — LOW (ref 13–17)
MAGNESIUM SERPL-MCNC: 1.8 MG/DL — SIGNIFICANT CHANGE UP (ref 1.6–2.6)
MCHC RBC-ENTMCNC: 27.4 PG — SIGNIFICANT CHANGE UP (ref 27–34)
MCHC RBC-ENTMCNC: 31.1 GM/DL — LOW (ref 32–36)
MCV RBC AUTO: 88.1 FL — SIGNIFICANT CHANGE UP (ref 80–100)
PHOSPHATE SERPL-MCNC: 2.9 MG/DL — SIGNIFICANT CHANGE UP (ref 2.5–4.5)
PLATELET # BLD AUTO: 245 K/UL — SIGNIFICANT CHANGE UP (ref 150–400)
POTASSIUM SERPL-MCNC: 4.7 MMOL/L — SIGNIFICANT CHANGE UP (ref 3.5–5.3)
POTASSIUM SERPL-SCNC: 4.7 MMOL/L — SIGNIFICANT CHANGE UP (ref 3.5–5.3)
RBC # BLD: 3.54 M/UL — LOW (ref 4.2–5.8)
RBC # FLD: 16.4 % — HIGH (ref 10.3–14.5)
SODIUM SERPL-SCNC: 138 MMOL/L — SIGNIFICANT CHANGE UP (ref 135–145)
WBC # BLD: 8.79 K/UL — SIGNIFICANT CHANGE UP (ref 3.8–10.5)
WBC # FLD AUTO: 8.79 K/UL — SIGNIFICANT CHANGE UP (ref 3.8–10.5)

## 2017-11-25 RX ORDER — SODIUM CHLORIDE 9 MG/ML
3 INJECTION INTRAMUSCULAR; INTRAVENOUS; SUBCUTANEOUS EVERY 8 HOURS
Qty: 0 | Refills: 0 | Status: DISCONTINUED | OUTPATIENT
Start: 2017-11-25 | End: 2017-11-28

## 2017-11-25 RX ADMIN — Medication 1 TABLET(S): at 15:58

## 2017-11-25 RX ADMIN — ATORVASTATIN CALCIUM 40 MILLIGRAM(S): 80 TABLET, FILM COATED ORAL at 21:07

## 2017-11-25 RX ADMIN — Medication 500 MILLIGRAM(S): at 15:57

## 2017-11-25 RX ADMIN — Medication 650 MILLIGRAM(S): at 21:07

## 2017-11-25 RX ADMIN — Medication 81 MILLIGRAM(S): at 15:57

## 2017-11-25 RX ADMIN — ISOSORBIDE MONONITRATE 30 MILLIGRAM(S): 60 TABLET, EXTENDED RELEASE ORAL at 15:58

## 2017-11-25 RX ADMIN — CARVEDILOL PHOSPHATE 6.25 MILLIGRAM(S): 80 CAPSULE, EXTENDED RELEASE ORAL at 06:27

## 2017-11-25 RX ADMIN — ENOXAPARIN SODIUM 40 MILLIGRAM(S): 100 INJECTION SUBCUTANEOUS at 15:57

## 2017-11-25 RX ADMIN — SODIUM CHLORIDE 3 MILLILITER(S): 9 INJECTION INTRAMUSCULAR; INTRAVENOUS; SUBCUTANEOUS at 21:06

## 2017-11-25 RX ADMIN — SODIUM CHLORIDE 3 MILLILITER(S): 9 INJECTION INTRAMUSCULAR; INTRAVENOUS; SUBCUTANEOUS at 15:52

## 2017-11-25 RX ADMIN — LISINOPRIL 5 MILLIGRAM(S): 2.5 TABLET ORAL at 06:27

## 2017-11-25 RX ADMIN — Medication 650 MILLIGRAM(S): at 21:38

## 2017-11-25 NOTE — PROGRESS NOTE ADULT - ASSESSMENT
89M s/p lap R colectomy    -CLD  -Home meds, hold plavix  -F/u GI function  -Pain control  -Monitor UOP  -IS/OOB 89M s/p lap R colectomy    -LRD with ensure today  -IV lock  -Home meds, hold plavix  -F/u GI function  -Pain control  -Monitor UOP  -IS/OOB

## 2017-11-25 NOTE — PROGRESS NOTE ADULT - PROBLEM SELECTOR PLAN 1
-- resolved.   -- GI and surgery eval appreciated.   -- hgb remains relatively stable.    -- s/p EGD and Colonoscopy 11/13 which shows ascending colon mass  -- Biopsy shows adenocarcinoma.   -- CT abdomen/chest shows no evidence of metastasis.  -- Oncology Dr. Kothari consult appreciated.  -- Surg-onc Dr Flores consulted  -- Pt s/p successful laparoscopic right colectomy POD # 5  -- Tolerating clear liquid diet. Regaining Gi function.   -- post op delirium, resolving. monitor.   -- off IVF. tolerating diet.

## 2017-11-25 NOTE — PROGRESS NOTE ADULT - SUBJECTIVE AND OBJECTIVE BOX
The patient continues to complain of anorexia (-not unexpected post op) but patient states that he is   passing gas and has had 2-3 BM's.  BP's under excellent control on current regimen      Vital Signs Last 24 Hrs  T(C): 36.9 (25 Nov 2017 09:32), Max: 37.1 (24 Nov 2017 21:25)  T(F): 98.4 (25 Nov 2017 09:32), Max: 98.7 (24 Nov 2017 21:25)  HR: 88 (25 Nov 2017 09:32) (70 - 88)  BP: 140/78 (25 Nov 2017 09:32) (92/56 - 140/78)  BP(mean): --  RR: 18 (25 Nov 2017 09:32) (18 - 18)  SpO2: 97% (25 Nov 2017 09:32) (95% - 98%)  Daily     Daily   I&O's Summary    24 Nov 2017 07:01  -  25 Nov 2017 07:00  --------------------------------------------------------  IN: 1160 mL / OUT: 150 mL / NET: 1010 mL    25 Nov 2017 07:01  -  25 Nov 2017 10:56  --------------------------------------------------------  IN: 0 mL / OUT: 150 mL / NET: -150 mL        Neck: no bruits, JVD, adenopathy  Chest: clear  Cor: [id number], RR, normal S1S2 with no mrght  Abd: soft, protuberant with very hypoactive bowel sounds and no HSM  Ext: w/o CC 1+pedal edema  Pulses:2+->dp bilaterally    MEDICATIONS  (STANDING):  amLODIPine   Tablet 2.5 milliGRAM(s) Oral daily  ascorbic acid 500 milliGRAM(s) Oral daily  aspirin enteric coated 81 milliGRAM(s) Oral daily  atorvastatin 40 milliGRAM(s) Oral at bedtime  carvedilol 6.25 milliGRAM(s) Oral every 12 hours  enoxaparin Injectable 40 milliGRAM(s) SubCutaneous daily  isosorbide   mononitrate ER Tablet (IMDUR) 30 milliGRAM(s) Oral daily  lisinopril 5 milliGRAM(s) Oral daily  multivitamin 1 Tablet(s) Oral daily  sodium chloride 0.9% lock flush 3 milliLiter(s) IV Push every 8 hours    MEDICATIONS  (PRN):  acetaminophen   Tablet. 650 milliGRAM(s) Oral every 4 hours PRN Mild Pain (1 - 3)  oxyCODONE    IR 2.5 milliGRAM(s) Oral every 4 hours PRN Moderate Pain (4 - 6)      11-25    138  |  108  |  10  ----------------------------<  112<H>  4.7   |  23  |  0.95    Ca    8.4      25 Nov 2017 08:20  Phos  2.9     11-25  Mg     1.8     11-25                            9.7    8.79  )-----------( 245      ( 25 Nov 2017 07:57 )             31.2         HEALTH ISSUES - PROBLEM Dx:  Colon adenocarcinoma:S/P hemicolectomy--OOB to chair ad trever  Nutrition, metabolism, and development symptoms:advance diet as tolerated (if OK with surgery)  Essential hypertension: well controlled on current regimen; continue current regimen  HLD (hyperlipidemia): HLD (hyperlipidemia)  CAD (coronary artery disease): CAD (coronary artery disease)  Anemia: Anemia  Epigastric abdominal pain: Epigastric abdominal pain

## 2017-11-25 NOTE — PROGRESS NOTE ADULT - SUBJECTIVE AND OBJECTIVE BOX
Patient is a 89y old  Male who presents with a chief complaint of generalized abdominal pain associated with melena found to have (21 Nov 2017 10:39)      SUBJECTIVE / OVERNIGHT EVENTS:  feeling better.  +flatus.  + BM, loose.  Still have mild abdominal discomfort but overall better.  no n/v/d.  the son at bedside.   BP much improved.       Vital Signs Last 24 Hrs  T(C): 36.9 (25 Nov 2017 13:20), Max: 37.1 (24 Nov 2017 21:25)  T(F): 98.4 (25 Nov 2017 13:20), Max: 98.7 (24 Nov 2017 21:25)  HR: 86 (25 Nov 2017 13:20) (76 - 88)  BP: 122/70 (25 Nov 2017 13:20) (115/67 - 140/78)  BP(mean): --  RR: 17 (25 Nov 2017 13:20) (17 - 18)  SpO2: 96% (25 Nov 2017 13:20) (96% - 98%)  I&O's Summary    24 Nov 2017 07:01  -  25 Nov 2017 07:00  --------------------------------------------------------  IN: 1160 mL / OUT: 150 mL / NET: 1010 mL    25 Nov 2017 07:01  -  25 Nov 2017 16:58  --------------------------------------------------------  IN: 240 mL / OUT: 150 mL / NET: 90 mL        PHYSICAL EXAM:  GENERAL: NAD, Comfortable  HEAD:  Atraumatic, Normocephalic  EYES: EOMI, PERRLA, conjunctiva and sclera clear  NECK: Supple, No JVD  CHEST/LUNG: Clear to auscultation bilaterally; No wheeze  HEART: Regular rate and rhythm; No murmurs, rubs, or gallops  ABDOMEN: Soft, mildly tender, Nondistended; Bowel sounds present  Neuro: AAO x 3, no focal deficit, 5/5 b/l extremities  EXTREMITIES:  2+ Peripheral Pulses, No clubbing, cyanosis, or edema  SKIN: No rashes or lesions    LABS:                        9.7    8.79  )-----------( 245      ( 25 Nov 2017 07:57 )             31.2     11-25    138  |  108  |  10  ----------------------------<  112<H>  4.7   |  23  |  0.95    Ca    8.4      25 Nov 2017 08:20  Phos  2.9     11-25  Mg     1.8     11-25        CAPILLARY BLOOD GLUCOSE                RADIOLOGY & ADDITIONAL TESTS:    Imaging Personally Reviewed:  [x] YES  [ ] NO    Consultant(s) Notes Reviewed:  [x] YES  [ ] NO      MEDICATIONS  (STANDING):  amLODIPine   Tablet 2.5 milliGRAM(s) Oral daily  ascorbic acid 500 milliGRAM(s) Oral daily  aspirin enteric coated 81 milliGRAM(s) Oral daily  atorvastatin 40 milliGRAM(s) Oral at bedtime  carvedilol 6.25 milliGRAM(s) Oral every 12 hours  enoxaparin Injectable 40 milliGRAM(s) SubCutaneous daily  isosorbide   mononitrate ER Tablet (IMDUR) 30 milliGRAM(s) Oral daily  lisinopril 5 milliGRAM(s) Oral daily  multivitamin 1 Tablet(s) Oral daily  sodium chloride 0.9% lock flush 3 milliLiter(s) IV Push every 8 hours    MEDICATIONS  (PRN):  acetaminophen   Tablet. 650 milliGRAM(s) Oral every 4 hours PRN Mild Pain (1 - 3)  oxyCODONE    IR 2.5 milliGRAM(s) Oral every 4 hours PRN Moderate Pain (4 - 6)      Care Discussed with Consultants/Other Providers [x] YES  [ ] NO    HEALTH ISSUES - PROBLEM Dx:  Preoperative cardiovascular examination: Preoperative cardiovascular examination  Colon adenocarcinoma: Colon adenocarcinoma  Nutrition, metabolism, and development symptoms: Nutrition, metabolism, and development symptoms  Prophylactic measure: Prophylactic measure  Essential hypertension: Essential hypertension  HLD (hyperlipidemia): HLD (hyperlipidemia)  CAD (coronary artery disease): CAD (coronary artery disease)  Anemia: Anemia  Epigastric abdominal pain: Epigastric abdominal pain  Melena: Melena

## 2017-11-26 LAB
ANION GAP SERPL CALC-SCNC: 8 MMOL/L — SIGNIFICANT CHANGE UP (ref 5–17)
BUN SERPL-MCNC: 11 MG/DL — SIGNIFICANT CHANGE UP (ref 7–23)
CALCIUM SERPL-MCNC: 8.3 MG/DL — LOW (ref 8.4–10.5)
CHLORIDE SERPL-SCNC: 108 MMOL/L — SIGNIFICANT CHANGE UP (ref 96–108)
CO2 SERPL-SCNC: 23 MMOL/L — SIGNIFICANT CHANGE UP (ref 22–31)
CREAT SERPL-MCNC: 0.79 MG/DL — SIGNIFICANT CHANGE UP (ref 0.5–1.3)
GLUCOSE SERPL-MCNC: 77 MG/DL — SIGNIFICANT CHANGE UP (ref 70–99)
HCT VFR BLD CALC: 28.7 % — LOW (ref 39–50)
HGB BLD-MCNC: 9 G/DL — LOW (ref 13–17)
MAGNESIUM SERPL-MCNC: 1.8 MG/DL — SIGNIFICANT CHANGE UP (ref 1.6–2.6)
MCHC RBC-ENTMCNC: 27.6 PG — SIGNIFICANT CHANGE UP (ref 27–34)
MCHC RBC-ENTMCNC: 31.4 GM/DL — LOW (ref 32–36)
MCV RBC AUTO: 88 FL — SIGNIFICANT CHANGE UP (ref 80–100)
PHOSPHATE SERPL-MCNC: 2.9 MG/DL — SIGNIFICANT CHANGE UP (ref 2.5–4.5)
PLATELET # BLD AUTO: 226 K/UL — SIGNIFICANT CHANGE UP (ref 150–400)
POTASSIUM SERPL-MCNC: 4.3 MMOL/L — SIGNIFICANT CHANGE UP (ref 3.5–5.3)
POTASSIUM SERPL-SCNC: 4.3 MMOL/L — SIGNIFICANT CHANGE UP (ref 3.5–5.3)
RBC # BLD: 3.26 M/UL — LOW (ref 4.2–5.8)
RBC # FLD: 16.9 % — HIGH (ref 10.3–14.5)
SODIUM SERPL-SCNC: 139 MMOL/L — SIGNIFICANT CHANGE UP (ref 135–145)
WBC # BLD: 8.24 K/UL — SIGNIFICANT CHANGE UP (ref 3.8–10.5)
WBC # FLD AUTO: 8.24 K/UL — SIGNIFICANT CHANGE UP (ref 3.8–10.5)

## 2017-11-26 RX ORDER — FUROSEMIDE 40 MG
20 TABLET ORAL DAILY
Qty: 0 | Refills: 0 | Status: DISCONTINUED | OUTPATIENT
Start: 2017-11-26 | End: 2017-11-28

## 2017-11-26 RX ADMIN — Medication 20 MILLIGRAM(S): at 06:09

## 2017-11-26 RX ADMIN — SODIUM CHLORIDE 3 MILLILITER(S): 9 INJECTION INTRAMUSCULAR; INTRAVENOUS; SUBCUTANEOUS at 15:48

## 2017-11-26 RX ADMIN — Medication 650 MILLIGRAM(S): at 21:00

## 2017-11-26 RX ADMIN — CARVEDILOL PHOSPHATE 6.25 MILLIGRAM(S): 80 CAPSULE, EXTENDED RELEASE ORAL at 06:09

## 2017-11-26 RX ADMIN — ATORVASTATIN CALCIUM 40 MILLIGRAM(S): 80 TABLET, FILM COATED ORAL at 21:00

## 2017-11-26 RX ADMIN — LISINOPRIL 5 MILLIGRAM(S): 2.5 TABLET ORAL at 06:09

## 2017-11-26 RX ADMIN — Medication 1 TABLET(S): at 11:02

## 2017-11-26 RX ADMIN — Medication 650 MILLIGRAM(S): at 21:30

## 2017-11-26 RX ADMIN — Medication 81 MILLIGRAM(S): at 11:02

## 2017-11-26 RX ADMIN — AMLODIPINE BESYLATE 2.5 MILLIGRAM(S): 2.5 TABLET ORAL at 06:09

## 2017-11-26 RX ADMIN — SODIUM CHLORIDE 3 MILLILITER(S): 9 INJECTION INTRAMUSCULAR; INTRAVENOUS; SUBCUTANEOUS at 06:05

## 2017-11-26 RX ADMIN — ISOSORBIDE MONONITRATE 30 MILLIGRAM(S): 60 TABLET, EXTENDED RELEASE ORAL at 11:02

## 2017-11-26 RX ADMIN — ENOXAPARIN SODIUM 40 MILLIGRAM(S): 100 INJECTION SUBCUTANEOUS at 11:01

## 2017-11-26 RX ADMIN — Medication 500 MILLIGRAM(S): at 11:02

## 2017-11-26 RX ADMIN — SODIUM CHLORIDE 3 MILLILITER(S): 9 INJECTION INTRAMUSCULAR; INTRAVENOUS; SUBCUTANEOUS at 21:01

## 2017-11-26 RX ADMIN — CARVEDILOL PHOSPHATE 6.25 MILLIGRAM(S): 80 CAPSULE, EXTENDED RELEASE ORAL at 19:18

## 2017-11-26 NOTE — PROGRESS NOTE ADULT - SUBJECTIVE AND OBJECTIVE BOX
Patient continues to do well.  Tolerating po.  Passing gas and had BM's.  Hasn't walked yet but should walk today.    Vital Signs Last 24 Hrs  T(C): 36.5 (26 Nov 2017 05:20), Max: 36.9 (25 Nov 2017 13:20)  T(F): 97.7 (26 Nov 2017 05:20), Max: 98.4 (25 Nov 2017 13:20)  HR: 84 (26 Nov 2017 05:20) (72 - 86)  BP: 145/72 (26 Nov 2017 05:20) (108/61 - 145/72)  BP(mean): --  RR: 18 (26 Nov 2017 05:20) (17 - 18)  SpO2: 97% (26 Nov 2017 05:20) (96% - 98%)  Daily     Daily   I&O's Summary    25 Nov 2017 07:01  -  26 Nov 2017 07:00  --------------------------------------------------------  IN: 440 mL / OUT: 550 mL / NET: -110 mL        Neck: no bruits, JVD, adenopathy  Chest: clear  Cor: [id number], RR, normal S1S2 with no mrght  Abd: soft, nontender, protuberant BS+ and no HSM  Ext: w/o CC; trace B pedal edema  Pulses:2+->dp bilaterally    MEDICATIONS  (STANDING):  amLODIPine   Tablet 2.5 milliGRAM(s) Oral daily  ascorbic acid 500 milliGRAM(s) Oral daily  aspirin enteric coated 81 milliGRAM(s) Oral daily  atorvastatin 40 milliGRAM(s) Oral at bedtime  carvedilol 6.25 milliGRAM(s) Oral every 12 hours  enoxaparin Injectable 40 milliGRAM(s) SubCutaneous daily  furosemide    Tablet 20 milliGRAM(s) Oral daily  isosorbide   mononitrate ER Tablet (IMDUR) 30 milliGRAM(s) Oral daily  lisinopril 5 milliGRAM(s) Oral daily  multivitamin 1 Tablet(s) Oral daily  sodium chloride 0.9% lock flush 3 milliLiter(s) IV Push every 8 hours    MEDICATIONS  (PRN):  acetaminophen   Tablet. 650 milliGRAM(s) Oral every 4 hours PRN Mild Pain (1 - 3)  oxyCODONE    IR 2.5 milliGRAM(s) Oral every 4 hours PRN Moderate Pain (4 - 6)      11-26    139  |  108  |  11  ----------------------------<  77  4.3   |  23  |  0.79    Ca    8.3<L>      26 Nov 2017 09:33  Phos  2.9     11-26  Mg     1.8     11-26                            9.0    8.24  )-----------( 226      ( 26 Nov 2017 09:29 )             28.7         HEALTH ISSUES - PROBLEM Dx:  lar examination  Colon adenocarcinoma: doing well postop  Nutrition, metabolism, and development symptoms: Nutrition, metabolism, and development symptoms  Prophylactic measure: Prophylactic measure  Essential hypertension: well controlled.  Discharge on current regimen.  No objection to discharge home tomorrow with home PT (if patient able to ambulate today)  HLD (hyperlipidemia): HLD (hyperlipidemia)  CAD (coronary artery disease): CAD (coronary artery disease)

## 2017-11-26 NOTE — PROGRESS NOTE ADULT - ASSESSMENT
89M s/p lap R colectomy    -LRD with ensure   -Home meds, hold plavix  -Pain control  -Monitor UOP  -IS/OOB  -discharge tomorrow

## 2017-11-26 NOTE — PROGRESS NOTE ADULT - SUBJECTIVE AND OBJECTIVE BOX
Patient is a 89y old  Male who presents with a chief complaint of generalized abdominal pain associated with melena found to have (21 Nov 2017 10:39)      SUBJECTIVE / OVERNIGHT EVENTS:  The son and the daughter at bedside.  Pt has flatus, but still having loose stool, mutliple episode.  ? incontinence.  Pain is well controlled.  Sitting in the chair.  Denied cp, sob. no n/v.       Vital Signs Last 24 Hrs  T(C): 36.5 (26 Nov 2017 10:00), Max: 36.9 (25 Nov 2017 13:20)  T(F): 97.7 (26 Nov 2017 10:00), Max: 98.4 (25 Nov 2017 13:20)  HR: 84 (26 Nov 2017 10:00) (72 - 86)  BP: 144/74 (26 Nov 2017 10:00) (108/61 - 145/72)  BP(mean): --  RR: 18 (26 Nov 2017 10:00) (17 - 18)  SpO2: 97% (26 Nov 2017 10:00) (96% - 98%)  I&O's Summary    25 Nov 2017 07:01  -  26 Nov 2017 07:00  --------------------------------------------------------  IN: 440 mL / OUT: 550 mL / NET: -110 mL    26 Nov 2017 07:01  -  26 Nov 2017 12:45  --------------------------------------------------------  IN: 0 mL / OUT: 125 mL / NET: -125 mL        PHYSICAL EXAM:  GENERAL: NAD, Comfortable  HEAD:  Atraumatic, Normocephalic  EYES: EOMI, PERRLA, conjunctiva and sclera clear  NECK: Supple, No JVD  CHEST/LUNG: Clear to auscultation bilaterally; No wheeze  HEART: Regular rate and rhythm; No murmurs, rubs, or gallops  ABDOMEN: Soft, mild post surgical tenderness, Nondistended; Bowel sounds present  Neuro: AAO x 3, no focal deficit, 5/5 b/l extremities  EXTREMITIES:  2+ Peripheral Pulses, No clubbing, cyanosis, or edema  SKIN: No rashes or lesions    LABS:                        9.0    8.24  )-----------( 226      ( 26 Nov 2017 09:29 )             28.7     11-26    139  |  108  |  11  ----------------------------<  77  4.3   |  23  |  0.79    Ca    8.3<L>      26 Nov 2017 09:33  Phos  2.9     11-26  Mg     1.8     11-26        CAPILLARY BLOOD GLUCOSE                RADIOLOGY & ADDITIONAL TESTS:    Imaging Personally Reviewed:  [x] YES  [ ] NO    Consultant(s) Notes Reviewed:  [x] YES  [ ] NO      MEDICATIONS  (STANDING):  amLODIPine   Tablet 2.5 milliGRAM(s) Oral daily  ascorbic acid 500 milliGRAM(s) Oral daily  aspirin enteric coated 81 milliGRAM(s) Oral daily  atorvastatin 40 milliGRAM(s) Oral at bedtime  carvedilol 6.25 milliGRAM(s) Oral every 12 hours  enoxaparin Injectable 40 milliGRAM(s) SubCutaneous daily  furosemide    Tablet 20 milliGRAM(s) Oral daily  isosorbide   mononitrate ER Tablet (IMDUR) 30 milliGRAM(s) Oral daily  lisinopril 5 milliGRAM(s) Oral daily  multivitamin 1 Tablet(s) Oral daily  sodium chloride 0.9% lock flush 3 milliLiter(s) IV Push every 8 hours    MEDICATIONS  (PRN):  acetaminophen   Tablet. 650 milliGRAM(s) Oral every 4 hours PRN Mild Pain (1 - 3)  oxyCODONE    IR 2.5 milliGRAM(s) Oral every 4 hours PRN Moderate Pain (4 - 6)      Care Discussed with Consultants/Other Providers [x] YES  [ ] NO    HEALTH ISSUES - PROBLEM Dx:  Preoperative cardiovascular examination: Preoperative cardiovascular examination  Colon adenocarcinoma: Colon adenocarcinoma  Nutrition, metabolism, and development symptoms: Nutrition, metabolism, and development symptoms  Prophylactic measure: Prophylactic measure  Essential hypertension: Essential hypertension  HLD (hyperlipidemia): HLD (hyperlipidemia)  CAD (coronary artery disease): CAD (coronary artery disease)  Anemia: Anemia  Epigastric abdominal pain: Epigastric abdominal pain  Melena: Melena

## 2017-11-26 NOTE — PROGRESS NOTE ADULT - SUBJECTIVE AND OBJECTIVE BOX
GENERAL SURGERY DAILY PROGRESS NOTE:       Subjective:  NAOE. Pt reports passing flatus and tolerating diet without N/V.         Objective:    PE:  General Appearance: Appears well, NAD  Abdomen: Soft, non-distended, appropriate incisional tenderness, dressings clean and dry and intact  Extremities: Grossly symmetric, SCD's in place    Vital Signs Last 24 Hrs  T(C): 36.5 (26 Nov 2017 05:20), Max: 36.9 (25 Nov 2017 09:32)  T(F): 97.7 (26 Nov 2017 05:20), Max: 98.4 (25 Nov 2017 09:32)  HR: 84 (26 Nov 2017 05:20) (72 - 88)  BP: 145/72 (26 Nov 2017 05:20) (108/61 - 145/72)  BP(mean): --  RR: 18 (26 Nov 2017 05:20) (17 - 18)  SpO2: 97% (26 Nov 2017 05:20) (96% - 98%)    I&O's Detail    25 Nov 2017 07:01  -  26 Nov 2017 07:00  --------------------------------------------------------  IN:    Oral Fluid: 440 mL  Total IN: 440 mL    OUT:    Voided: 550 mL  Total OUT: 550 mL    Total NET: -110 mL          Daily     Daily     MEDICATIONS  (STANDING):  amLODIPine   Tablet 2.5 milliGRAM(s) Oral daily  ascorbic acid 500 milliGRAM(s) Oral daily  aspirin enteric coated 81 milliGRAM(s) Oral daily  atorvastatin 40 milliGRAM(s) Oral at bedtime  carvedilol 6.25 milliGRAM(s) Oral every 12 hours  enoxaparin Injectable 40 milliGRAM(s) SubCutaneous daily  furosemide    Tablet 20 milliGRAM(s) Oral daily  isosorbide   mononitrate ER Tablet (IMDUR) 30 milliGRAM(s) Oral daily  lisinopril 5 milliGRAM(s) Oral daily  multivitamin 1 Tablet(s) Oral daily  sodium chloride 0.9% lock flush 3 milliLiter(s) IV Push every 8 hours    MEDICATIONS  (PRN):  acetaminophen   Tablet. 650 milliGRAM(s) Oral every 4 hours PRN Mild Pain (1 - 3)  oxyCODONE    IR 2.5 milliGRAM(s) Oral every 4 hours PRN Moderate Pain (4 - 6)      LABS:                        9.7    8.79  )-----------( 245      ( 25 Nov 2017 07:57 )             31.2     11-25    138  |  108  |  10  ----------------------------<  112<H>  4.7   |  23  |  0.95    Ca    8.4      25 Nov 2017 08:20  Phos  2.9     11-25  Mg     1.8     11-25            RADIOLOGY & ADDITIONAL STUDIES:

## 2017-11-26 NOTE — PROGRESS NOTE ADULT - PROBLEM SELECTOR PLAN 1
-- s/p EGD and Colonoscopy 11/13 which shows ascending colon mass  -- Biopsy shows adenocarcinoma.   -- CT abdomen/chest shows no evidence of metastasis.  -- Oncology Dr. Kothari consult appreciated.  -- Surg-onc Dr Flores consulted  -- Pt s/p successful laparoscopic right colectomy POD # 6  -- Tolerating clear liquid diet. Diet advanced to low fiber diet.  -- off IVF. Tolerating diet but poor appetite.

## 2017-11-27 DIAGNOSIS — N50.89 OTHER SPECIFIED DISORDERS OF THE MALE GENITAL ORGANS: ICD-10-CM

## 2017-11-27 DIAGNOSIS — R44.1 VISUAL HALLUCINATIONS: ICD-10-CM

## 2017-11-27 DIAGNOSIS — F05 DELIRIUM DUE TO KNOWN PHYSIOLOGICAL CONDITION: ICD-10-CM

## 2017-11-27 LAB
ANION GAP SERPL CALC-SCNC: 10 MMOL/L — SIGNIFICANT CHANGE UP (ref 5–17)
APPEARANCE UR: CLEAR — SIGNIFICANT CHANGE UP
BILIRUB UR-MCNC: NEGATIVE — SIGNIFICANT CHANGE UP
BUN SERPL-MCNC: 16 MG/DL — SIGNIFICANT CHANGE UP (ref 7–23)
CALCIUM SERPL-MCNC: 8.4 MG/DL — SIGNIFICANT CHANGE UP (ref 8.4–10.5)
CHLORIDE SERPL-SCNC: 104 MMOL/L — SIGNIFICANT CHANGE UP (ref 96–108)
CO2 SERPL-SCNC: 22 MMOL/L — SIGNIFICANT CHANGE UP (ref 22–31)
COLOR SPEC: YELLOW — SIGNIFICANT CHANGE UP
CREAT SERPL-MCNC: 0.87 MG/DL — SIGNIFICANT CHANGE UP (ref 0.5–1.3)
DIFF PNL FLD: NEGATIVE — SIGNIFICANT CHANGE UP
GLUCOSE SERPL-MCNC: 67 MG/DL — LOW (ref 70–99)
GLUCOSE UR QL: NEGATIVE MG/DL — SIGNIFICANT CHANGE UP
HCT VFR BLD CALC: 30.3 % — LOW (ref 39–50)
HGB BLD-MCNC: 9.4 G/DL — LOW (ref 13–17)
KETONES UR-MCNC: NEGATIVE — SIGNIFICANT CHANGE UP
LEUKOCYTE ESTERASE UR-ACNC: NEGATIVE — SIGNIFICANT CHANGE UP
MAGNESIUM SERPL-MCNC: 1.8 MG/DL — SIGNIFICANT CHANGE UP (ref 1.6–2.6)
MCHC RBC-ENTMCNC: 27.2 PG — SIGNIFICANT CHANGE UP (ref 27–34)
MCHC RBC-ENTMCNC: 31 GM/DL — LOW (ref 32–36)
MCV RBC AUTO: 87.8 FL — SIGNIFICANT CHANGE UP (ref 80–100)
NITRITE UR-MCNC: NEGATIVE — SIGNIFICANT CHANGE UP
PH UR: 5 — SIGNIFICANT CHANGE UP (ref 5–8)
PHOSPHATE SERPL-MCNC: 3.2 MG/DL — SIGNIFICANT CHANGE UP (ref 2.5–4.5)
PLATELET # BLD AUTO: 236 K/UL — SIGNIFICANT CHANGE UP (ref 150–400)
POTASSIUM SERPL-MCNC: 4.2 MMOL/L — SIGNIFICANT CHANGE UP (ref 3.5–5.3)
POTASSIUM SERPL-SCNC: 4.2 MMOL/L — SIGNIFICANT CHANGE UP (ref 3.5–5.3)
PROT UR-MCNC: NEGATIVE MG/DL — SIGNIFICANT CHANGE UP
RBC # BLD: 3.45 M/UL — LOW (ref 4.2–5.8)
RBC # FLD: 16.7 % — HIGH (ref 10.3–14.5)
SODIUM SERPL-SCNC: 136 MMOL/L — SIGNIFICANT CHANGE UP (ref 135–145)
SP GR SPEC: 1.01 — SIGNIFICANT CHANGE UP (ref 1.01–1.02)
SURGICAL PATHOLOGY STUDY: SIGNIFICANT CHANGE UP
UROBILINOGEN FLD QL: NEGATIVE MG/DL — SIGNIFICANT CHANGE UP
WBC # BLD: 7.27 K/UL — SIGNIFICANT CHANGE UP (ref 3.8–10.5)
WBC # FLD AUTO: 7.27 K/UL — SIGNIFICANT CHANGE UP (ref 3.8–10.5)

## 2017-11-27 PROCEDURE — 93010 ELECTROCARDIOGRAM REPORT: CPT

## 2017-11-27 RX ORDER — NYSTATIN CREAM 100000 [USP'U]/G
1 CREAM TOPICAL DAILY
Qty: 0 | Refills: 0 | Status: DISCONTINUED | OUTPATIENT
Start: 2017-11-27 | End: 2017-11-28

## 2017-11-27 RX ORDER — LISINOPRIL 2.5 MG/1
1 TABLET ORAL
Qty: 0 | Refills: 0 | COMMUNITY
Start: 2017-11-27

## 2017-11-27 RX ORDER — QUETIAPINE FUMARATE 200 MG/1
25 TABLET, FILM COATED ORAL AT BEDTIME
Qty: 0 | Refills: 0 | Status: DISCONTINUED | OUTPATIENT
Start: 2017-11-27 | End: 2017-11-28

## 2017-11-27 RX ORDER — AMLODIPINE BESYLATE 2.5 MG/1
1 TABLET ORAL
Qty: 0 | Refills: 0 | COMMUNITY
Start: 2017-11-27

## 2017-11-27 RX ORDER — MAGNESIUM SULFATE 500 MG/ML
1 VIAL (ML) INJECTION ONCE
Qty: 0 | Refills: 0 | Status: COMPLETED | OUTPATIENT
Start: 2017-11-27 | End: 2017-11-27

## 2017-11-27 RX ORDER — OXYCODONE HYDROCHLORIDE 5 MG/1
1 TABLET ORAL
Qty: 14 | Refills: 0 | OUTPATIENT
Start: 2017-11-27

## 2017-11-27 RX ORDER — ACETAMINOPHEN 500 MG
2 TABLET ORAL
Qty: 0 | Refills: 0 | COMMUNITY
Start: 2017-11-27

## 2017-11-27 RX ADMIN — LISINOPRIL 5 MILLIGRAM(S): 2.5 TABLET ORAL at 06:24

## 2017-11-27 RX ADMIN — CARVEDILOL PHOSPHATE 6.25 MILLIGRAM(S): 80 CAPSULE, EXTENDED RELEASE ORAL at 06:24

## 2017-11-27 RX ADMIN — Medication 500 MILLIGRAM(S): at 12:34

## 2017-11-27 RX ADMIN — ATORVASTATIN CALCIUM 40 MILLIGRAM(S): 80 TABLET, FILM COATED ORAL at 23:05

## 2017-11-27 RX ADMIN — AMLODIPINE BESYLATE 2.5 MILLIGRAM(S): 2.5 TABLET ORAL at 06:24

## 2017-11-27 RX ADMIN — Medication 81 MILLIGRAM(S): at 12:35

## 2017-11-27 RX ADMIN — QUETIAPINE FUMARATE 25 MILLIGRAM(S): 200 TABLET, FILM COATED ORAL at 23:05

## 2017-11-27 RX ADMIN — Medication 650 MILLIGRAM(S): at 23:35

## 2017-11-27 RX ADMIN — ENOXAPARIN SODIUM 40 MILLIGRAM(S): 100 INJECTION SUBCUTANEOUS at 12:36

## 2017-11-27 RX ADMIN — SODIUM CHLORIDE 3 MILLILITER(S): 9 INJECTION INTRAMUSCULAR; INTRAVENOUS; SUBCUTANEOUS at 06:23

## 2017-11-27 RX ADMIN — SODIUM CHLORIDE 3 MILLILITER(S): 9 INJECTION INTRAMUSCULAR; INTRAVENOUS; SUBCUTANEOUS at 22:30

## 2017-11-27 RX ADMIN — Medication 20 MILLIGRAM(S): at 06:24

## 2017-11-27 RX ADMIN — Medication 650 MILLIGRAM(S): at 23:05

## 2017-11-27 RX ADMIN — Medication 100 GRAM(S): at 12:33

## 2017-11-27 RX ADMIN — SODIUM CHLORIDE 3 MILLILITER(S): 9 INJECTION INTRAMUSCULAR; INTRAVENOUS; SUBCUTANEOUS at 18:46

## 2017-11-27 RX ADMIN — Medication 1 TABLET(S): at 12:36

## 2017-11-27 NOTE — BEHAVIORAL HEALTH ASSESSMENT NOTE - HPI (INCLUDE ILLNESS QUALITY, SEVERITY, DURATION, TIMING, CONTEXT, MODIFYING FACTORS, ASSOCIATED SIGNS AND SYMPTOMS)
89 y/o Male, retired, , father of 3 adult children, domiciled with son, with no significant PPHx, no history of psychiatric hospitalizations or suicidality, and PMHx of 89 y/o Male, retired, , father of 3 adult children, domiciled with son, with no significant PPHx, no history of psychiatric hospitalizations or suicidality, and PMHx of CAD s/p stents (last 15 years ago) on ASA/plavix, HTN, HLD, possible CHF a/wadenocarcinoma of the colon, s/p successful laparoscopic right colectomy POD # 7. Psychiatry consulted after pt reported seeing insects crawling on the walls of his hospital unit.    Pt found laying in chair. He has difficulty hearing despite wearing B/L hearing aid and is noted to speak loudly in order to be heard. He is noted to communicate better with his son at bedside. He is AAOX3 but has difficulty staying on topic and unable to pay attention for an extended period of time. Pt reports seeing insects crawling on the walls of his hospital room, that no one else can see, since admission. He denies AH, denies other perceptual disturbances. Pt denies drinking alcohol or taking BZDs in the past. He also c/o poor sleep due to frequent interruptions for vitals and night meds, receiving only 3 hrs of sleep each night. He reports having a "decent appetite," but being unhappy with hospital food, looking forward to better chicken upon discharge home. He denies neurovegetative symptoms, does report missing his wife who has  6 yrs ago. Denies manic symptoms, denies feeling anxious. He is future oriented with plans to travel to [place] to live with his son soon. He reports feeling safe in the hospital, denies SI/HI.    Son at bedside reports pt has been reporting insects crawling on the wall since admission. Although symptoms are not getting worse and pt is able to ignore this, expresses concerns about pt seeing this in the first place. He reports pt does have a history of delirium. He has never carried a psychiatric diagnosis and has never been evaluated by a psychiatrist in the past. Pt is able to care for himself at baseline, able to shower and eat by himself.

## 2017-11-27 NOTE — PROGRESS NOTE ADULT - SUBJECTIVE AND OBJECTIVE BOX
Patient is a 89y old  Male who presents with a chief complaint of generalized abdominal pain associated with melena (21 Nov 2017 10:39)      SUBJECTIVE / OVERNIGHT EVENTS:  diarrhea resolved.  Reports hallucinations, seeing water drops and things in motion.  no cp, no sob, no n/v/d. no abdominal pain.  no headache, no dizziness.   Pain is much improved.   Tolerating diet.   Also reports scrotal edema, LE edema b/l.       Vital Signs Last 24 Hrs  T(C): 37.2 (27 Nov 2017 14:03), Max: 37.2 (27 Nov 2017 14:03)  T(F): 99 (27 Nov 2017 14:03), Max: 99 (27 Nov 2017 14:03)  HR: 84 (27 Nov 2017 14:03) (65 - 84)  BP: 144/74 (27 Nov 2017 14:03) (95/53 - 145/68)  BP(mean): --  RR: 18 (27 Nov 2017 14:03) (18 - 18)  SpO2: 95% (27 Nov 2017 14:03) (95% - 98%)  I&O's Summary    26 Nov 2017 07:01  -  27 Nov 2017 07:00  --------------------------------------------------------  IN: 240 mL / OUT: 1125 mL / NET: -885 mL    27 Nov 2017 07:01  -  27 Nov 2017 17:17  --------------------------------------------------------  IN: 720 mL / OUT: 400 mL / NET: 320 mL        PHYSICAL EXAM:  GENERAL: NAD, Comfortable  HEAD:  Atraumatic, Normocephalic  EYES: EOMI, PERRLA, conjunctiva and sclera clear  NECK: Supple, No JVD  CHEST/LUNG: Clear to auscultation bilaterally; No wheeze  HEART: Regular rate and rhythm; No murmurs, rubs, or gallops  ABDOMEN: Soft, Nontender, Nondistended; Bowel sounds present  Neuro: AAO x 3, no focal deficit, 5/5 b/l extremities  EXTREMITIES:  1+edema b/l knee, +scrotal edema with mild skin bruise.   SKIN: No rashes or lesions    LABS:                        9.4    7.27  )-----------( 236      ( 27 Nov 2017 08:39 )             30.3     11-27    136  |  104  |  16  ----------------------------<  67<L>  4.2   |  22  |  0.87    Ca    8.4      27 Nov 2017 08:45  Phos  3.2     11-27  Mg     1.8     11-27        CAPILLARY BLOOD GLUCOSE                RADIOLOGY & ADDITIONAL TESTS:    Imaging Personally Reviewed:  [x] YES  [ ] NO    Consultant(s) Notes Reviewed:  [x] YES  [ ] NO      MEDICATIONS  (STANDING):  amLODIPine   Tablet 2.5 milliGRAM(s) Oral daily  ascorbic acid 500 milliGRAM(s) Oral daily  aspirin enteric coated 81 milliGRAM(s) Oral daily  atorvastatin 40 milliGRAM(s) Oral at bedtime  carvedilol 6.25 milliGRAM(s) Oral every 12 hours  enoxaparin Injectable 40 milliGRAM(s) SubCutaneous daily  furosemide    Tablet 20 milliGRAM(s) Oral daily  isosorbide   mononitrate ER Tablet (IMDUR) 30 milliGRAM(s) Oral daily  lisinopril 5 milliGRAM(s) Oral daily  multivitamin 1 Tablet(s) Oral daily  sodium chloride 0.9% lock flush 3 milliLiter(s) IV Push every 8 hours    MEDICATIONS  (PRN):  acetaminophen   Tablet. 650 milliGRAM(s) Oral every 4 hours PRN Mild Pain (1 - 3)  oxyCODONE    IR 2.5 milliGRAM(s) Oral every 4 hours PRN Moderate Pain (4 - 6)      Care Discussed with Consultants/Other Providers [x] YES  [ ] NO    HEALTH ISSUES - PROBLEM Dx:  Preoperative cardiovascular examination: Preoperative cardiovascular examination  Colon adenocarcinoma: Colon adenocarcinoma  Nutrition, metabolism, and development symptoms: Nutrition, metabolism, and development symptoms  Prophylactic measure: Prophylactic measure  Essential hypertension: Essential hypertension  HLD (hyperlipidemia): HLD (hyperlipidemia)  CAD (coronary artery disease): CAD (coronary artery disease)  Anemia: Anemia  Epigastric abdominal pain: Epigastric abdominal pain  Melena: Melena Patient is a 89y old  Male who presents with a chief complaint of generalized abdominal pain associated with melena (21 Nov 2017 10:39)      SUBJECTIVE / OVERNIGHT EVENTS:  diarrhea resolved.  Reports hallucinations, seeing water drops and things in motion.  no cp, no sob, no n/v/d. no abdominal pain.  no headache, no dizziness.   Pain is much improved.   Tolerating diet.   Also reports scrotal edema, LE edema b/l.   Pt walked with PT this am. 	      Vital Signs Last 24 Hrs  T(C): 37.2 (27 Nov 2017 14:03), Max: 37.2 (27 Nov 2017 14:03)  T(F): 99 (27 Nov 2017 14:03), Max: 99 (27 Nov 2017 14:03)  HR: 84 (27 Nov 2017 14:03) (65 - 84)  BP: 144/74 (27 Nov 2017 14:03) (95/53 - 145/68)  BP(mean): --  RR: 18 (27 Nov 2017 14:03) (18 - 18)  SpO2: 95% (27 Nov 2017 14:03) (95% - 98%)  I&O's Summary    26 Nov 2017 07:01  -  27 Nov 2017 07:00  --------------------------------------------------------  IN: 240 mL / OUT: 1125 mL / NET: -885 mL    27 Nov 2017 07:01  -  27 Nov 2017 17:17  --------------------------------------------------------  IN: 720 mL / OUT: 400 mL / NET: 320 mL        PHYSICAL EXAM:  GENERAL: NAD, Comfortable  HEAD:  Atraumatic, Normocephalic  EYES: EOMI, PERRLA, conjunctiva and sclera clear  NECK: Supple, No JVD  CHEST/LUNG: Clear to auscultation bilaterally; No wheeze  HEART: Regular rate and rhythm; No murmurs, rubs, or gallops  ABDOMEN: Soft, Nontender, Nondistended; Bowel sounds present  Neuro: AAO x 3, no focal deficit, 5/5 b/l extremities  EXTREMITIES:  1+edema b/l knee, +scrotal edema with mild skin bruise.   SKIN: No rashes or lesions    LABS:                        9.4    7.27  )-----------( 236      ( 27 Nov 2017 08:39 )             30.3     11-27    136  |  104  |  16  ----------------------------<  67<L>  4.2   |  22  |  0.87    Ca    8.4      27 Nov 2017 08:45  Phos  3.2     11-27  Mg     1.8     11-27        CAPILLARY BLOOD GLUCOSE                RADIOLOGY & ADDITIONAL TESTS:    Imaging Personally Reviewed:  [x] YES  [ ] NO    Consultant(s) Notes Reviewed:  [x] YES  [ ] NO      MEDICATIONS  (STANDING):  amLODIPine   Tablet 2.5 milliGRAM(s) Oral daily  ascorbic acid 500 milliGRAM(s) Oral daily  aspirin enteric coated 81 milliGRAM(s) Oral daily  atorvastatin 40 milliGRAM(s) Oral at bedtime  carvedilol 6.25 milliGRAM(s) Oral every 12 hours  enoxaparin Injectable 40 milliGRAM(s) SubCutaneous daily  furosemide    Tablet 20 milliGRAM(s) Oral daily  isosorbide   mononitrate ER Tablet (IMDUR) 30 milliGRAM(s) Oral daily  lisinopril 5 milliGRAM(s) Oral daily  multivitamin 1 Tablet(s) Oral daily  sodium chloride 0.9% lock flush 3 milliLiter(s) IV Push every 8 hours    MEDICATIONS  (PRN):  acetaminophen   Tablet. 650 milliGRAM(s) Oral every 4 hours PRN Mild Pain (1 - 3)  oxyCODONE    IR 2.5 milliGRAM(s) Oral every 4 hours PRN Moderate Pain (4 - 6)      Care Discussed with Consultants/Other Providers [x] YES  [ ] NO    HEALTH ISSUES - PROBLEM Dx:  Preoperative cardiovascular examination: Preoperative cardiovascular examination  Colon adenocarcinoma: Colon adenocarcinoma  Nutrition, metabolism, and development symptoms: Nutrition, metabolism, and development symptoms  Prophylactic measure: Prophylactic measure  Essential hypertension: Essential hypertension  HLD (hyperlipidemia): HLD (hyperlipidemia)  CAD (coronary artery disease): CAD (coronary artery disease)  Anemia: Anemia  Epigastric abdominal pain: Epigastric abdominal pain  Melena: Melena

## 2017-11-27 NOTE — BEHAVIORAL HEALTH ASSESSMENT NOTE - NSBHCHARTREVIEWIMAGING_PSY_A_CORE FT
CT CHEST IC                          EXAM:  CT ABDOMEN AND PELVIS OC IC                        PROCEDURE DATE:  11/14/2017    PROCEDURE:   CT of the Chest, Abdomen and Pelvis was performed with intravenous   contrast.   Intravenous contrast: 90 ml Omnipaque 350. 10 ml discarded.  Oral contrast: positive contrast was administered.  Sagittal and coronal reformats were performed.    FINDINGS:    CHEST:     LUNGS AND LARGE AIRWAYS: Mild secretions in the trachea. No pulmonary   nodules.  PLEURA: Moderate bilateral pleural effusions.  VESSELS: Within normal limits.  HEART: Mild cardiomegaly. No pericardial effusion.  MEDIASTINUM AND CAITLIN: Postsurgical changes in the anterior mediastinum   suggestive of CABG.  CHEST WALL AND LOWER NECK: Within normal limits.    ABDOMEN AND PELVIS:    LIVER: Within normal limits.  BILE DUCTS: Normal caliber.  GALLBLADDER: Within normal limits.  SPLEEN: A few small hypodense lesions, nonspecific.  PANCREAS: Within normal limits.  ADRENALS: Within normal limits.  KIDNEYS/URETERS: Within normal limits.    BLADDER: Small diverticula.  REPRODUCTIVE ORGANS: Enlarged prostate.    BOWEL: No bowel obstruction. Large right colon mass. A few small   pericolonic nodes. Unremarkable appendix.  PERITONEUM: Trace ascites.  VESSELS:  Heavy atherosclerosis of the abdominal aorta with eccentric   plaque.  RETROPERITONEUM: No lymphadenopathy.    ABDOMINAL WALL: Within normal limits.  BONES: Within normal limits.    IMPRESSION: Large right colon mass. No evidence of metastatic disease.   Moderate pleural effusions.  ARASH HOLLINS M.D., ATTENDING RADIOLOGIST  This document has been electronically signed. Nov [id number]  9:02AM

## 2017-11-27 NOTE — BEHAVIORAL HEALTH ASSESSMENT NOTE - AXIS III
CAD s/p stents (last 15 years ago) on ASA/plavix, HTN, HLD, possible CHF a/wadenocarcinoma of the colon, s/p successful laparoscopic right colectomy POD # 7

## 2017-11-27 NOTE — PROVIDER CONTACT NOTE (OTHER) - DATE AND TIME:
21-Nov-2017 11:20
10-Nov-2017 01:31
10-Nov-2017 06:30
12-Nov-2017 12:05
21-Nov-2017 18:40
21-Nov-2017 22:55
23-Nov-2017 11:40
24-Nov-2017 01:15
24-Nov-2017 06:00
27-Nov-2017 15:00

## 2017-11-27 NOTE — PROGRESS NOTE ADULT - PROBLEM SELECTOR PLAN 2
Unclear, likely delirium with intact mental status.  Psyc team consulted. Pt remained AAOx 3 at baseline.  Noted glucose of 67 on SMA. monitor.  Encouraged PO intake.   CT head if no improvement. No focal deficit.   Check TSH, T4, RPR. vitamin B12, vitamin D (orders placed) Likely delirium with intact mental status. Denied Si/HI.  Psyc team consulted. Pt remained AAOx 3 at baseline.   Seroquel and Haldol PRN per psyc.   Noted glucose of 67 on SMA. monitor.  Encouraged PO intake.   CT head if no improvement. No focal deficit.   Check TSH, T4, RPR. vitamin B12, vitamin D (orders placed)

## 2017-11-27 NOTE — BEHAVIORAL HEALTH ASSESSMENT NOTE - NSBHCHARTREVIEWVS_PSY_A_CORE FT
Vital Signs Last 24 Hrs  T(C): 37.2 (27 Nov 2017 14:03), Max: 37.2 (27 Nov 2017 14:03)  T(F): 99 (27 Nov 2017 14:03), Max: 99 (27 Nov 2017 14:03)  HR: 84 (27 Nov 2017 14:03) (65 - 84)  BP: 144/74 (27 Nov 2017 14:03) (95/53 - 145/68)  BP(mean): --  RR: 18 (27 Nov 2017 14:03) (18 - 18)  SpO2: 95% (27 Nov 2017 14:03) (95% - 97%)

## 2017-11-27 NOTE — PROGRESS NOTE ADULT - ASSESSMENT
Impression  Stable on carvedilol. low dose lisinopril and amlodipine.    Recommend:  Would D/C or decrease frequency of lasix.                       Continue carvedilol, amlodipine and lisinopril at current dose.                       To be evaluated by PT for rehab.

## 2017-11-27 NOTE — PROGRESS NOTE ADULT - SUBJECTIVE AND OBJECTIVE BOX
GENERAL SURGERY DAILY PROGRESS NOTE:     Subjective:  NAOE. Pt seen at bedside this am. Pain controlled. Denies N/V. Tolerating diet. Passing flatus and BM.        Objective:    PE:  General Appearance: Appears well, NAD  Abdomen: Soft, non-distended, appropriate minimal incisional tenderness, dressings clean and dry and intact  Extremities: Grossly symmetric, SCD's in place    Vital Signs Last 24 Hrs  T(C): 36.7 (27 Nov 2017 06:20), Max: 36.8 (27 Nov 2017 01:12)  T(F): 98.1 (27 Nov 2017 06:20), Max: 98.3 (27 Nov 2017 01:12)  HR: 84 (27 Nov 2017 06:20) (73 - 84)  BP: 145/68 (27 Nov 2017 06:20) (121/74 - 145/68)  BP(mean): --  RR: 18 (27 Nov 2017 06:20) (18 - 18)  SpO2: 96% (27 Nov 2017 06:20) (96% - 98%)    I&O's Detail    26 Nov 2017 07:01  -  27 Nov 2017 07:00  --------------------------------------------------------  IN:    Oral Fluid: 240 mL  Total IN: 240 mL    OUT:    Voided: 1125 mL  Total OUT: 1125 mL    Total NET: -885 mL          Daily     Daily     MEDICATIONS  (STANDING):  amLODIPine   Tablet 2.5 milliGRAM(s) Oral daily  ascorbic acid 500 milliGRAM(s) Oral daily  aspirin enteric coated 81 milliGRAM(s) Oral daily  atorvastatin 40 milliGRAM(s) Oral at bedtime  carvedilol 6.25 milliGRAM(s) Oral every 12 hours  enoxaparin Injectable 40 milliGRAM(s) SubCutaneous daily  furosemide    Tablet 20 milliGRAM(s) Oral daily  isosorbide   mononitrate ER Tablet (IMDUR) 30 milliGRAM(s) Oral daily  lisinopril 5 milliGRAM(s) Oral daily  multivitamin 1 Tablet(s) Oral daily  sodium chloride 0.9% lock flush 3 milliLiter(s) IV Push every 8 hours    MEDICATIONS  (PRN):  acetaminophen   Tablet. 650 milliGRAM(s) Oral every 4 hours PRN Mild Pain (1 - 3)  oxyCODONE    IR 2.5 milliGRAM(s) Oral every 4 hours PRN Moderate Pain (4 - 6)      LABS:                        9.0    8.24  )-----------( 226      ( 26 Nov 2017 09:29 )             28.7     11-26    139  |  108  |  11  ----------------------------<  77  4.3   |  23  |  0.79    Ca    8.3<L>      26 Nov 2017 09:33  Phos  2.9     11-26  Mg     1.8     11-26            RADIOLOGY & ADDITIONAL STUDIES:

## 2017-11-27 NOTE — PROVIDER CONTACT NOTE (OTHER) - REASON
pt was angry with PT today, yelling and banging walker in bathroom; pt stated to RN that we are "tricksters -- that when he closes his eyes, we move the furniture and move  him around the room and when he opens his eyes, we have put him back in chair"

## 2017-11-27 NOTE — CHART NOTE - NSCHARTNOTEFT_GEN_A_CORE
Spoke with psychiatry attending regarding pt's agitation. EKG performed revealing QTc of 473. Pt started on 25mg Seroquel qhs and night time (2am) vitals to be held overnight so pt can have a conrad night of sleep without disturbance. Psychiatry will follow. Pt pending [facility] placement/authorization.    Please page #[phone] for any questions or concerns

## 2017-11-27 NOTE — BEHAVIORAL HEALTH ASSESSMENT NOTE - SUMMARY
89 y/o Male, retired, , father of 3 adult children, domiciled with son, with no significant PPHx, no history of psychiatric hospitalizations or suicidality, and PMHx of CAD s/p stents (last 15 years ago) on ASA/plavix, HTN, HLD, possible CHF a/wadenocarcinoma of the colon, s/p successful laparoscopic right colectomy POD # 7. Psychiatry consulted after pt reported seeing insects crawling on the walls of his hospital unit.    Although pt is AAOX3, he has difficulty sustaining attention, unable to stay on topic and reports visual hallucinations. In light of his multiple medical issues, poor sleep, recent surgery and prolonged hospitalization, hyperactive delirium is a strong possibility. Lewy body dementia can present in early phases with visual hallucinations and this should be ruled out.    1) Counseling, psychoeducation and support provided  2) Please initiate Seroquel 25mg PO QHS, maintain QTc <500ms  3) Limit interruptions of sleep at night  4) Obtain MRI brain to r/o LB dementia  5) Minimize use of benzos, opioids, anticholinergics, or other deliriogenic agents when possible. Maintain sleep wake cycle. Provide frequent reorientation and redirection. Family member at bedside if possible.

## 2017-11-27 NOTE — PROGRESS NOTE ADULT - ASSESSMENT
89M s/p lap R colectomy    -LRD with ensure   -Home meds, hold plavix  -Pain control  -Monitor UOP  -IS/OOB  -discharge home today

## 2017-11-27 NOTE — PROVIDER CONTACT NOTE (OTHER) - SITUATION
Patient tolerated 1st OOB but received 0.5mg Dilaudid for pain- since then he has been c/o of dizziness & continued pain in abdomen. VS assessed & BP is 186/83, HR 71
Manual BP = 168/80
Patient urine output during 12 hrs shift is only 350ml, Devine catheter patency maintained. In addition, patient c/o of dizziness while in the bed.
Pt /77
Pt /90
Pt experiencing dizziness
Pt has BP of 196/91. Hydralazine given. Pt ordered for norvasc, to be given.
Pt's BP this AM is 182/82, pt without c/o HA.
Requesting change of diet from regular to clear liquid in preparation for colonoscopy on Monday 11/13
pt family states that he has these "episodes" at home and believes it may be worse in the hospital

## 2017-11-27 NOTE — PROGRESS NOTE ADULT - SUBJECTIVE AND OBJECTIVE BOX
Patient complaining about still being in hospital.    BP, urinary volume and BMs acceptable.        REVIEW OF SYSTEMS:  CARDIOVASCULAR: No chest pain, dyspnea or palpitations  All other review of systems is negative unless indicated above    Medications:  acetaminophen   Tablet. 650 milliGRAM(s) Oral every 4 hours PRN  amLODIPine   Tablet 2.5 milliGRAM(s) Oral daily  ascorbic acid 500 milliGRAM(s) Oral daily  aspirin enteric coated 81 milliGRAM(s) Oral daily  atorvastatin 40 milliGRAM(s) Oral at bedtime  carvedilol 6.25 milliGRAM(s) Oral every 12 hours  enoxaparin Injectable 40 milliGRAM(s) SubCutaneous daily  furosemide    Tablet 20 milliGRAM(s) Oral daily  isosorbide   mononitrate ER Tablet (IMDUR) 30 milliGRAM(s) Oral daily  lisinopril 5 milliGRAM(s) Oral daily  multivitamin 1 Tablet(s) Oral daily  oxyCODONE    IR 2.5 milliGRAM(s) Oral every 4 hours PRN  sodium chloride 0.9% lock flush 3 milliLiter(s) IV Push every 8 hours      Physical Exam:  Vitals:  T(C): 36.7 (11-27-17 @ 06:20), Max: 36.8 (11-27-17 @ 01:12)  HR: 84 (11-27-17 @ 06:20) (73 - 84)  BP: 145/68 (11-27-17 @ 06:20) (121/74 - 145/68)  BP(mean): --  RR: 18 (11-27-17 @ 06:20) (18 - 18)  SpO2: 96% (11-27-17 @ 06:20) (96% - 98%)  Wt(kg): --  Daily     Daily   I&O's Summary    26 Nov 2017 07:01  -  27 Nov 2017 07:00  --------------------------------------------------------  IN: 240 mL / OUT: 1125 mL / NET: -885 mL        Appearance:  Normal, NAD  Eyes:  PERRL, EOMI  HEENT: Normal oral mus[place]a, NC/AT  Neck:  No JVD or HJR  Respiratory: Clear to auscultation bilaterally  Cardiovascular: Normal S1 and S2 without murmurs, rubs or gallops  Abdomen:   BS normal, Soft,  Non-tender without organomegally or masses  Extremities: Trace bilateral ankle and lower leg edema          11-26    Complete Blood Count in AM (11.26.17 @ 09:29)    WBC Count: 8.24 K/uL    RBC Count: 3.26 M/uL    Hemoglobin: 9.0 g/dL    Hematocrit: 28.7 %    Mean Cell Volume: 88.0 fl    Mean Cell Hemoglobin: 27.6 pg    Mean Cell Hemoglobin Conc: 31.4 gm/dL    Red Cell Distrib Width: 16.9 %    Platelet Count - Automated: 226 K/uL        139  |  108  |  11  ----------------------------<  77  4.3   |  23  |  0.79    Ca    8.3<L>      26 Nov 2017 09:33  Phos  2.9     11-26  Mg     1.8     11-26

## 2017-11-27 NOTE — BEHAVIORAL HEALTH ASSESSMENT NOTE - NSBHCHARTREVIEWLAB_PSY_A_CORE FT
11-27    136  |  104  |  16  ----------------------------<  67<L>  4.2   |  22  |  0.87    Ca    8.4      27 Nov 2017 08:45  Phos  3.2     11-27  Mg     1.8     11-27    CBC Full  -  ( 27 Nov 2017 08:39 )  WBC Count : 7.27 K/uL  Hemoglobin : 9.4 g/dL  Hematocrit : 30.3 %  Platelet Count - Automated : 236 K/uL  Mean Cell Volume : 87.8 fl  Mean Cell Hemoglobin : 27.2 pg  Mean Cell Hemoglobin Concentration : 31.0 gm/dL

## 2017-11-27 NOTE — BEHAVIORAL HEALTH ASSESSMENT NOTE - CASE SUMMARY
Pt is a 89 y/o WWM with hx of colon cancer, no past psychiatric history, presents with anemia/abdominal pain secondary to melena. Psychiatry called for confusion/mild irritability. Pt seen, reports seeing insects crawling in his room intermittently. Currently AOA x 3, denies depression or anxiety, no si/hi, and admits to poor sleep at night, feeling irritable during the day. Appetite fair. Pt with no past psychiatric hx, no hx of self injurious behaviors or suicide attempts. Pt in accord to take seroquel 25 mg QHS for sleep/irritable mood, and haldol 0.5 mg TID PRN for acute agitation. QTc will need to followed up. No need for inpt psychiatric admission.

## 2017-11-27 NOTE — PROGRESS NOTE ADULT - PROBLEM SELECTOR PLAN 1
-- s/p EGD and Colonoscopy 11/13 which shows ascending colon mass  -- Biopsy shows adenocarcinoma.   -- CT abdomen/chest shows no evidence of metastasis.  -- Oncology Dr. Kothari consult appreciated.  -- Surg-onc Dr Flores consulted  -- Pt s/p successful laparoscopic right colectomy POD # 7  -- Tolerating low fiber diet.  -- off IVF

## 2017-11-27 NOTE — BEHAVIORAL HEALTH ASSESSMENT NOTE - MUSCLE TONE / STRENGTH
Caretaker


LUKE Lenz [Primary Care Provider] - 1 Day
Normal muscle tone/strength

## 2017-11-28 VITALS
DIASTOLIC BLOOD PRESSURE: 56 MMHG | RESPIRATION RATE: 18 BRPM | SYSTOLIC BLOOD PRESSURE: 94 MMHG | TEMPERATURE: 97 F | OXYGEN SATURATION: 96 % | HEART RATE: 69 BPM

## 2017-11-28 LAB
T PALLIDUM AB TITR SER: NEGATIVE — SIGNIFICANT CHANGE UP
T4 AB SER-ACNC: 5.4 UG/DL — SIGNIFICANT CHANGE UP (ref 4.6–12)
TSH SERPL-MCNC: 1.76 UIU/ML — SIGNIFICANT CHANGE UP (ref 0.27–4.2)
VIT B12 SERPL-MCNC: 754 PG/ML — SIGNIFICANT CHANGE UP (ref 243–894)

## 2017-11-28 PROCEDURE — 99238 HOSP IP/OBS DSCHRG MGMT 30/<: CPT | Mod: 24

## 2017-11-28 RX ORDER — QUETIAPINE FUMARATE 200 MG/1
1 TABLET, FILM COATED ORAL
Qty: 0 | Refills: 0 | COMMUNITY
Start: 2017-11-28

## 2017-11-28 RX ORDER — NYSTATIN CREAM 100000 [USP'U]/G
1 CREAM TOPICAL
Qty: 0 | Refills: 0 | COMMUNITY
Start: 2017-11-28

## 2017-11-28 RX ADMIN — LISINOPRIL 5 MILLIGRAM(S): 2.5 TABLET ORAL at 06:49

## 2017-11-28 RX ADMIN — ISOSORBIDE MONONITRATE 30 MILLIGRAM(S): 60 TABLET, EXTENDED RELEASE ORAL at 12:34

## 2017-11-28 RX ADMIN — CARVEDILOL PHOSPHATE 6.25 MILLIGRAM(S): 80 CAPSULE, EXTENDED RELEASE ORAL at 06:49

## 2017-11-28 RX ADMIN — ENOXAPARIN SODIUM 40 MILLIGRAM(S): 100 INJECTION SUBCUTANEOUS at 12:33

## 2017-11-28 RX ADMIN — Medication 500 MILLIGRAM(S): at 12:34

## 2017-11-28 RX ADMIN — Medication 20 MILLIGRAM(S): at 06:49

## 2017-11-28 RX ADMIN — AMLODIPINE BESYLATE 2.5 MILLIGRAM(S): 2.5 TABLET ORAL at 06:49

## 2017-11-28 RX ADMIN — SODIUM CHLORIDE 3 MILLILITER(S): 9 INJECTION INTRAMUSCULAR; INTRAVENOUS; SUBCUTANEOUS at 06:51

## 2017-11-28 RX ADMIN — Medication 1 TABLET(S): at 12:34

## 2017-11-28 RX ADMIN — Medication 81 MILLIGRAM(S): at 12:34

## 2017-11-28 NOTE — PROGRESS NOTE ADULT - ASSESSMENT
89 year old man with melena found to have tumor in the ascending colon. Biopsy shows invasive adenocarcinoma. Staging studies shows isolated lesion. s/p resection. Found to have T3N0 tumor

## 2017-11-28 NOTE — PROGRESS NOTE ADULT - PROBLEM SELECTOR PLAN 6
--continue isosorbide mononitrite
hep SQ
--SCDs
--continue isosorbide mononitrite
hep SQ
--continue statin
--continue statin

## 2017-11-28 NOTE — PROGRESS NOTE ADULT - PROBLEM SELECTOR PROBLEM 5
HLD (hyperlipidemia)
Essential hypertension
HLD (hyperlipidemia)
CAD (coronary artery disease)
Essential hypertension
HLD (hyperlipidemia)
CAD (coronary artery disease)
HLD (hyperlipidemia)

## 2017-11-28 NOTE — PROGRESS NOTE ADULT - PROBLEM SELECTOR PROBLEM 2
Epigastric abdominal pain
Anemia
Epigastric abdominal pain
Anemia
Colon adenocarcinoma
Epigastric abdominal pain
Hallucination, visual
Hallucination, visual
Epigastric abdominal pain

## 2017-11-28 NOTE — PROGRESS NOTE ADULT - PROBLEM SELECTOR PROBLEM 7
Prophylactic measure
Prophylactic measure
Nutrition, metabolism, and development symptoms
Prophylactic measure
Nutrition, metabolism, and development symptoms
Prophylactic measure
Essential hypertension
Essential hypertension

## 2017-11-28 NOTE — PROGRESS NOTE ADULT - PROBLEM SELECTOR PROBLEM 8
Nutrition, metabolism, and development symptoms
Prophylactic measure
Prophylactic measure

## 2017-11-28 NOTE — PROGRESS NOTE ADULT - PROBLEM SELECTOR PROBLEM 3
Anemia
Anemia
CAD (coronary artery disease)
Anemia
CAD (coronary artery disease)
Scrotal edema
Scrotal edema
Anemia

## 2017-11-28 NOTE — PROGRESS NOTE ADULT - PROBLEM SELECTOR PROBLEM 6
Essential hypertension
Prophylactic measure
Essential hypertension
Prophylactic measure
HLD (hyperlipidemia)
HLD (hyperlipidemia)

## 2017-11-28 NOTE — PROGRESS NOTE ADULT - SUBJECTIVE AND OBJECTIVE BOX
Patient is a 89y old  Male who presents with a chief complaint of generalized abdominal pain associated with melena (2017 10:39)      SUBJECTIVE / OVERNIGHT EVENTS:  sitting up.   The son and the daughter at bedside.  Hallucinations has resolved per pt.  Denied HA, dizziness.   Tolerating diet.  No diarrhea.   Awaiting rehab today.       Vital Signs Last 24 Hrs  T(C): 36.3 (2017 14:30), Max: 36.9 (2017 21:06)  T(F): 97.3 (2017 14:30), Max: 98.5 (2017 10:44)  HR: 69 (2017 14:30) (64 - 83)  BP: 94/56 (2017 14:30) (94/56 - 148/72)  BP(mean): --  RR: 18 (2017 14:30) (18 - 18)  SpO2: 96% (2017 14:30) (96% - 98%)  I&O's Summary    2017 07:01  -  2017 07:00  --------------------------------------------------------  IN: 1020 mL / OUT: 1075 mL / NET: -55 mL    2017 07:01  -  2017 16:37  --------------------------------------------------------  IN: 600 mL / OUT: 875 mL / NET: -275 mL        PHYSICAL EXAM:  GENERAL: NAD, Comfortable  HEAD:  Atraumatic, Normocephalic  EYES: EOMI, PERRLA, conjunctiva and sclera clear  NECK: Supple, No JVD  CHEST/LUNG: Clear to auscultation bilaterally; No wheeze  HEART: Regular rate and rhythm; No murmurs, rubs, or gallops  ABDOMEN: Soft, Nontender, Nondistended; Bowel sounds present  Neuro: AAO x 3, no focal deficit, 5/5 b/l extremities  EXTREMITIES:  resolving edema b/l knee, +scrotal edema, with scrotal support.   SKIN: No rashes or lesions    LABS:                        9.4    7.27  )-----------( 236      ( 2017 08:39 )             30.3         136  |  104  |  16  ----------------------------<  67<L>  4.2   |  22  |  0.87    Ca    8.4      2017 08:45  Phos  3.2       Mg     1.8             CAPILLARY BLOOD GLUCOSE            Urinalysis Basic - ( 2017 22:43 )    Color: Yellow / Appearance: Clear / S.012 / pH: x  Gluc: x / Ketone: Negative  / Bili: Negative / Urobili: Negative mg/dL   Blood: x / Protein: Negative mg/dL / Nitrite: Negative   Leuk Esterase: Negative / RBC: x / WBC x   Sq Epi: x / Non Sq Epi: x / Bacteria: x        RADIOLOGY & ADDITIONAL TESTS:    Imaging Personally Reviewed:  [x] YES  [ ] NO    Consultant(s) Notes Reviewed:  [x] YES  [ ] NO      MEDICATIONS  (STANDING):  amLODIPine   Tablet 2.5 milliGRAM(s) Oral daily  ascorbic acid 500 milliGRAM(s) Oral daily  aspirin enteric coated 81 milliGRAM(s) Oral daily  atorvastatin 40 milliGRAM(s) Oral at bedtime  carvedilol 6.25 milliGRAM(s) Oral every 12 hours  enoxaparin Injectable 40 milliGRAM(s) SubCutaneous daily  furosemide    Tablet 20 milliGRAM(s) Oral daily  isosorbide   mononitrate ER Tablet (IMDUR) 30 milliGRAM(s) Oral daily  lisinopril 5 milliGRAM(s) Oral daily  multivitamin 1 Tablet(s) Oral daily  nystatin Powder 1 Application(s) Topical daily  QUEtiapine 25 milliGRAM(s) Oral at bedtime  sodium chloride 0.9% lock flush 3 milliLiter(s) IV Push every 8 hours    MEDICATIONS  (PRN):  acetaminophen   Tablet. 650 milliGRAM(s) Oral every 4 hours PRN Mild Pain (1 - 3)  oxyCODONE    IR 2.5 milliGRAM(s) Oral every 4 hours PRN Moderate Pain (4 - 6)      Care Discussed with Consultants/Other Providers [x] YES  [ ] NO    HEALTH ISSUES - PROBLEM Dx:  Delirium due to another medical condition, acute, hypoactive  Scrotal edema: Scrotal edema  Hallucination, visual: Hallucination, visual  Preoperative cardiovascular examination: Preoperative cardiovascular examination  Colon adenocarcinoma: Colon adenocarcinoma  Nutrition, metabolism, and development symptoms: Nutrition, metabolism, and development symptoms  Prophylactic measure: Prophylactic measure  Essential hypertension: Essential hypertension  HLD (hyperlipidemia): HLD (hyperlipidemia)  CAD (coronary artery disease): CAD (coronary artery disease)  Anemia: Anemia  Epigastric abdominal pain: Epigastric abdominal pain  Melena: Melena

## 2017-11-28 NOTE — PROGRESS NOTE ADULT - PROBLEM SELECTOR PLAN 5
--continue statin
--continue statin
--continue isosorbide mononitrite, Lasix. Norvasc.  -- c/w Lisinopril 5 mg
--continue statin
-- continue isosorbide mononitrite, Lasix. Norvasc.  -- c/w Lisinopril 5 mg  -- BP much better.
--continue isosorbide mononitrite
--continue isosorbide mononitrite
--continue isosorbide mononitrite, Lasix. Norvasc.  Will restart Lisinopril 5 mg  IVF may be contributing HTN.
--continue isosorbide mononitrite, Lasix. Norvasc.  c/w Lisinopril 5 mg  IVF may be contributing HTN.  This am, remained hypertensive with systolic 180s but now systolic 120s.   Continue with current regimen.
--continue statin
-- holding Plavix in the setting of acute blood loss anemia.   -- patient is many years away from stents, will hold AC for now.  -- c/w aspirin.
-- holding Plavix in setting of acute blood loss anemia.   -- patient is many years away from stents, will hold AC for now.  -- c/w aspirin.

## 2017-11-28 NOTE — CONSULT NOTE ADULT - SUBJECTIVE AND OBJECTIVE BOX
HPI:  This is a 89 year old gentleman with history of CAD s/p stents (last 15 years ago) on ASA/plavix, HTN, HLD, possible CHF p/w 2 days of epigastric pain, melena. Patient describes pain as intermittent, 8/10, not associated with eating.  He notes 3 episodes of soft black bowel movements, last ~ 16 hours ago. He denies light headedness, dizziness, SOB, chest pain, palpitations, nausea, vomiting, fevers, chills. Of note, patient shares his time between [place] and [place].  He recently came to [place] after having been worked up in [place] for anemia, found to be iron deficient.      In ED, Tmax 36.6, HR 80, /80, satting well on RA.  Labs notable for WBC 10.1, Hgb 9.4, Plt 274, K 5.2, Cr 0.95, LFT WNL, INR 1.19. (09 Nov 2017 23:38)    Patient is a 89y old  Male who presents with a chief complaint of generalized abdominal pain associated with melena (21 Nov 2017 10:39)    Allergies    No Known Allergies    Intolerances      Vital Signs Last 24 Hrs  T(C): 36.9 (28 Nov 2017 10:44), Max: 37.2 (27 Nov 2017 14:03)  T(F): 98.5 (28 Nov 2017 10:44), Max: 99 (27 Nov 2017 14:03)  HR: 64 (28 Nov 2017 10:44) (64 - 84)  BP: 109/55 (28 Nov 2017 10:44) (109/55 - 148/72)  BP(mean): --  RR: 18 (28 Nov 2017 10:44) (18 - 18)  SpO2: 98% (28 Nov 2017 10:44) (95% - 98%)                        9.4    7.27  )-----------( 236      ( 27 Nov 2017 08:39 )             30.3     11-27    136  |  104  |  16  ----------------------------<  67<L>  4.2   |  22  |  0.87    Ca    8.4      27 Nov 2017 08:45  Phos  3.2     11-27  Mg     1.8     11-27      CAPILLARY BLOOD GLUCOSE        MEDICATIONS  (STANDING):  amLODIPine   Tablet 2.5 milliGRAM(s) Oral daily  ascorbic acid 500 milliGRAM(s) Oral daily  aspirin enteric coated 81 milliGRAM(s) Oral daily  atorvastatin 40 milliGRAM(s) Oral at bedtime  carvedilol 6.25 milliGRAM(s) Oral every 12 hours  enoxaparin Injectable 40 milliGRAM(s) SubCutaneous daily  furosemide    Tablet 20 milliGRAM(s) Oral daily  isosorbide   mononitrate ER Tablet (IMDUR) 30 milliGRAM(s) Oral daily  lisinopril 5 milliGRAM(s) Oral daily  multivitamin 1 Tablet(s) Oral daily  nystatin Powder 1 Application(s) Topical daily  QUEtiapine 25 milliGRAM(s) Oral at bedtime  sodium chloride 0.9% lock flush 3 milliLiter(s) IV Push every 8 hours    MEDICATIONS  (PRN):  acetaminophen   Tablet. 650 milliGRAM(s) Oral every 4 hours PRN Mild Pain (1 - 3)  oxyCODONE    IR 2.5 milliGRAM(s) Oral every 4 hours PRN Moderate Pain (4 - 6)    PAST MEDICAL & SURGICAL HISTORY:  HLD (hyperlipidemia)  Hypertension  CAD (coronary artery disease)  No significant past surgical history        MARGARITA:  Elongated, painful toe nails x 10  No open lesions or local signs of infection  Epicritic sensation intact  Pedal pulses palp 2/4 bilaterally

## 2017-11-28 NOTE — PROGRESS NOTE ADULT - SUBJECTIVE AND OBJECTIVE BOX
SURGERY DAILY PROGRESS NOTE:       SUBJECTIVE/ROS: Patient was agitated overnight.  Spoke with psych, EKG performed revealing QTc of 473. Pt started on 25mg Seroquel qhs and night time (2am) vitals to be held overnight so pt can have a conrad night of sleep without disturbance. +flatus, tolerating diet.  Denies nausea, vomiting, chest pain, shortness of breath.         MEDICATIONS  (STANDING):  amLODIPine   Tablet 2.5 milliGRAM(s) Oral daily  ascorbic acid 500 milliGRAM(s) Oral daily  aspirin enteric coated 81 milliGRAM(s) Oral daily  atorvastatin 40 milliGRAM(s) Oral at bedtime  carvedilol 6.25 milliGRAM(s) Oral every 12 hours  enoxaparin Injectable 40 milliGRAM(s) SubCutaneous daily  furosemide    Tablet 20 milliGRAM(s) Oral daily  isosorbide   mononitrate ER Tablet (IMDUR) 30 milliGRAM(s) Oral daily  lisinopril 5 milliGRAM(s) Oral daily  multivitamin 1 Tablet(s) Oral daily  nystatin Powder 1 Application(s) Topical daily  QUEtiapine 25 milliGRAM(s) Oral at bedtime  sodium chloride 0.9% lock flush 3 milliLiter(s) IV Push every 8 hours    MEDICATIONS  (PRN):  acetaminophen   Tablet. 650 milliGRAM(s) Oral every 4 hours PRN Mild Pain (1 - 3)  oxyCODONE    IR 2.5 milliGRAM(s) Oral every 4 hours PRN Moderate Pain (4 - 6)      OBJECTIVE:    Vital Signs Last 24 Hrs  T(C): 36.8 (2017 05:59), Max: 37.2 (2017 14:03)  T(F): 98.3 (2017 05:59), Max: 99 (2017 14:03)  HR: 67 (2017 05:59) (65 - 84)  BP: 148/72 (2017 05:59) (95/53 - 148/72)  BP(mean): --  RR: 18 (2017 05:59) (18 - 18)  SpO2: 97% (2017 05:59) (95% - 97%)        I&O's Detail    2017 07:01  -  2017 07:00  --------------------------------------------------------  IN:    Oral Fluid: 1020 mL  Total IN: 1020 mL    OUT:    Voided: 1075 mL  Total OUT: 1075 mL    Total NET: -55 mL          Daily     Daily     LABS:                        9.4    7.27  )-----------( 236      ( 2017 08:39 )             30.3         136  |  104  |  16  ----------------------------<  67<L>  4.2   |  22  |  0.87    Ca    8.4      2017 08:45  Phos  3.2       Mg     1.8             Urinalysis Basic - ( 2017 22:43 )    Color: Yellow / Appearance: Clear / S.012 / pH: x  Gluc: x / Ketone: Negative  / Bili: Negative / Urobili: Negative mg/dL   Blood: x / Protein: Negative mg/dL / Nitrite: Negative   Leuk Esterase: Negative / RBC: x / WBC x   Sq Epi: x / Non Sq Epi: x / Bacteria: x      PHYSICAL EXAM:  Constitutional: NAD  Gastrointestinal:  Soft, non-distended, appropriate incisional tenderness, dressings clean and dry and intact  No peritonitis  Psychiatric: oriented x 3; appropriate  Extremities: Grossly symmetric, SCD's in place

## 2017-11-28 NOTE — PROGRESS NOTE ADULT - SUBJECTIVE AND OBJECTIVE BOX
Behavior is less agitated than yesterday.  Denies dyspnea or chest pain.  C/O scrotal edema.        REVIEW OF SYSTEMS:  CARDIOVASCULAR: No chest pain, dyspnea or palpitations  All other review of systems is negative unless indicated above    Medications:  acetaminophen   Tablet. 650 milliGRAM(s) Oral every 4 hours PRN  amLODIPine   Tablet 2.5 milliGRAM(s) Oral daily  ascorbic acid 500 milliGRAM(s) Oral daily  aspirin enteric coated 81 milliGRAM(s) Oral daily  atorvastatin 40 milliGRAM(s) Oral at bedtime  carvedilol 6.25 milliGRAM(s) Oral every 12 hours  enoxaparin Injectable 40 milliGRAM(s) SubCutaneous daily  furosemide    Tablet 20 milliGRAM(s) Oral daily  isosorbide   mononitrate ER Tablet (IMDUR) 30 milliGRAM(s) Oral daily  lisinopril 5 milliGRAM(s) Oral daily  multivitamin 1 Tablet(s) Oral daily  nystatin Powder 1 Application(s) Topical daily  oxyCODONE    IR 2.5 milliGRAM(s) Oral every 4 hours PRN  QUEtiapine 25 milliGRAM(s) Oral at bedtime  sodium chloride 0.9% lock flush 3 milliLiter(s) IV Push every 8 hours      Physical Exam:  Vitals:  T(C): 36.8 (11-28-17 @ 05:59), Max: 37.2 (11-27-17 @ 14:03)  HR: 67 (11-28-17 @ 05:59) (65 - 84)  BP: 148/72 (11-28-17 @ 05:59) (95/53 - 148/72)  BP(mean): --  RR: 18 (11-28-17 @ 05:59) (18 - 18)  SpO2: 97% (11-28-17 @ 05:59) (95% - 97%)  Wt(kg): --  Daily     Daily   I&O's Summary    27 Nov 2017 07:01  -  28 Nov 2017 07:00  --------------------------------------------------------  IN: 1020 mL / OUT: 1075 mL / NET: -55 mL        Appearance:  Normal, NAD  Eyes:  PERRL, EOMI  HEENT: Normal oral mus[place]a, NC/AT  Neck:  No JVD  Respiratory: Clear to auscultation bilaterally  Cardiovascular: Normal S1 and S2 without murmurs, rubs or gallops  Abdomen:   BS normal, Soft,  Non-tender without organomegally or masses  Extremities: No leg edema.  + Scrotal edema          11-28 TFTs, B12, pending.   No lytes or CBC    1

## 2017-11-28 NOTE — PROGRESS NOTE ADULT - PROBLEM SELECTOR PLAN 1
-- s/p EGD and Colonoscopy 11/13 which shows ascending colon mass  -- Biopsy shows adenocarcinoma.   -- CT abdomen/chest shows no evidence of metastasis.  -- Surg-onc Dr Flores consulted  -- Pt s/p successful laparoscopic right colectomy POD # 8  -- Tolerating low fiber diet.  -- off IVF  -- Oncology Dr. Kothari consult appreciated.  No chemo for now given his age. Monitor.

## 2017-11-28 NOTE — PROGRESS NOTE ADULT - ASSESSMENT
89M POD#8 s/p lap R colectomy    -LRD with ensure   -Home meds, hold plavix  -Pain control  -Monitor UOP  -IS/OOB  -DVT ppx  -f/u Podiatry  - patient to f/u with psych after d/c  - Dispo: MANUEL FarleyC

## 2017-11-28 NOTE — PROGRESS NOTE ADULT - PROBLEM SELECTOR PLAN 4
-- holding ASA, Plavix in setting of acute blood loss anemia.   -- patient is many years away from stents, will hold AC for now.  -- hold on discharge as well.
-- holding ASA, Plavix in setting of acute blood loss anemia.   -- patient is many years away from stents, will hold AC for now.  -- may likely need to be on hold on discharge as well.
--continue statin
-- holding ASA, Plavix in setting of acute blood loss anemia.   -- patient is many years away from stents, will hold AC for now.
-- holding ASA, Plavix in setting of acute blood loss anemia.   -- patient is many years away from stents, will hold AC for now.  -- hold on discharge as well.
-- suspect multifactorial, blood loss vs. iron deficiency from bleeding colon mass.   -- continue home iron, s/p 2 doses of IV Iron.   -- Hgb stable. monitor.
--continue statin
--holding ASA, Plavix in setting of acute blood loss anemia.   -- patient is many years away from stents, will hold AC for now.
-- suspect multifactorial, blood loss vs. iron deficiency from bleeding colon mass.   -- continue home iron, s/p 2 doses of IV Iron.   -- Hgb stable. monitor.
-- holding ASA, Plavix in setting of acute blood loss anemia.   -- patient is many years away from stents, will hold AC for now.  -- hold on discharge as well.

## 2017-11-28 NOTE — PROGRESS NOTE ADULT - PROBLEM SELECTOR PROBLEM 4
CAD (coronary artery disease)
CAD (coronary artery disease)
HLD (hyperlipidemia)
Anemia
CAD (coronary artery disease)
HLD (hyperlipidemia)
Anemia
CAD (coronary artery disease)

## 2017-11-28 NOTE — PROGRESS NOTE ADULT - PROBLEM SELECTOR PLAN 2
Likely delirium with intact mental status. Denied Si/HI. Resolved.   Seen by Psyc. Pt remained AAOx 3 at baseline.   Doing well on Seroquel and Haldol PRN.  CT head if no improvement. No focal deficit.   TSH, T4, RPR. vitamin B12 unremarkable.

## 2017-11-28 NOTE — PROGRESS NOTE ADULT - ASSESSMENT
Stable cardiac status.      Mild systolic BP elevation this AM prior to AM antihypertensives.    Scrotal elevation likely secondary to post-op.  No evidence of CHF on exam.    Plan: Continue current anti-hypertensives.

## 2017-11-28 NOTE — PROGRESS NOTE ADULT - PROBLEM SELECTOR PLAN 7
--SCDs
diet
--SCDs
diet
diet  start Senna QHS for constipation.
diet  start Senna QHS for constipation.
-- continue isosorbide mononitrite, Lasix. Norvasc.  -- c/w Lisinopril 5 mg  -- BP much better.
-- continue isosorbide mononitrite, Lasix. Norvasc.  -- c/w Lisinopril 5 mg  -- BP much better.

## 2017-11-28 NOTE — PROGRESS NOTE ADULT - PROBLEM SELECTOR PROBLEM 1
Annalisa
Annalisa
Anemia
Annalisa

## 2017-11-28 NOTE — PROGRESS NOTE ADULT - PROBLEM SELECTOR PLAN 3
--suspect multifactorial, blood loss vs. iron deficiency from bleeding colon mass.   --continue home iron, will give 1x IV Iron today.  -- Hgb stable. monitor.
-- holding Plavix in setting of acute blood loss anemia.   -- patient is many years away from stents, will hold AC for now.  -- c/w aspirin.
--suspect multifactorial, blood loss vs. iron deficiency from bleeding colon mass.   --continue home iron, s/p 2 doses of IV Iron.   -- Hgb stable. monitor.
-- holding ASA, Plavix in setting of acute blood loss anemia.   -- patient is many years away from stents, will hold AC for now.  -- may likely need to be on hold on discharge as well.
-- holding Plavix in setting of acute blood loss anemia.   -- patient is many years away from stents, will hold AC for now.  -- c/w aspirin.
-- holding Plavix in setting of acute blood loss anemia.   -- patient is many years away from stents, will hold AC for now.  -- restarted on aspirin today.
-- holding Plavix in setting of acute blood loss anemia.   -- patient is many years away from stents, will hold AC for now.  -- restarted on aspirin.
-- holding Plavix in setting of acute blood loss anemia.   -- patient is many years away from stents, will hold AC for now.  -- restarted on aspirin.
--suspect multifactorial, blood loss vs. iron deficiency  --continue home iron  --iron studies
--suspect multifactorial, blood loss vs. iron deficiency from bleeding colon mass.   --continue home iron, s/p 2 doses of IV Iron.   -- Hgb stable. monitor.
--suspect multifactorial, blood loss vs. iron deficiency from bleeding colon mass.   --continue home iron, s/p 2 doses of IV Iron.   -- Hgb stable. monitor.
--suspect multifactorial, blood loss vs. iron deficiency from bleeding colon mass.   --continue home iron, will give 1x IV Iron today.  -- Hgb stable. monitor.
--suspect multifactorial, blood loss vs. iron deficiency from bleeding colon mass.   --continue home iron, will give 1x IV Iron. will give one additional dose.   -- Hgb stable. monitor.
c/w low dose Lasix 20 mg qdaily  Nystatin powder for rash.  frequent positioning, scrotal elevation.
likely from recent IVF.  c/w low dose Lasix 20 mg qdaily  Nystatin powder for rash.  frequent positioning, scrotal elevation.
--suspect multifactorial, blood loss vs. iron deficiency from bleeding colon mass.   --continue home iron, s/p 2 doses of IV Iron.   -- Hgb stable. monitor.

## 2017-11-28 NOTE — CONSULT NOTE ADULT - ASSESSMENT
89 year old male with onychomycosis  -Pt seen and evaluated  -Aseptic debridement of elognated nails  -Pedal hygine education  -Follow up within 2 months of DC with either me or home pod.  Call [phone] for apt.

## 2017-11-28 NOTE — CONSULT NOTE ADULT - CONSULT REQUESTED DATE/TIME
10-Nov-2017 10:15
14-Nov-2017 18:00
16-Nov-2017 09:23
22-Nov-2017 15:32
15-Nov-2017 18:59
28-Nov-2017 10:54

## 2017-11-28 NOTE — PROGRESS NOTE ADULT - PROBLEM SELECTOR PLAN 8
diet  start Senna QHS for constipation.
diet
diet  start Senna QHS for constipation.
diet
diet
diet  start Senna QHS for constipation.
diet  start Senna QHS for constipation.
restarted on diet post EGD.
hep SQ
hep SQ

## 2017-11-28 NOTE — PROGRESS NOTE ADULT - PROBLEM SELECTOR PLAN 2
CT scans shows isolated disease  - s/p laparoscopic resection 11/20/2017  - Pathology shows a T3N0 tumor. No high risk features seen on pathology. Awaiting MSI testing. Discussed chemotherapy with patient. Patient is refusing. Patient will not need chemotherapy given pathology, age, and co-morbidities. Can be monitored.   - Follow up as outpatient.     MD Citl[facility] - A Division of [address] [address]  (T) [phone]  (F) [phone]

## 2017-11-28 NOTE — PROGRESS NOTE ADULT - SUBJECTIVE AND OBJECTIVE BOX
[facility] - Renee Olivia / Rafael / Taye - [phone]  Primary Outpatient Hematologist/Oncologist: None    Subjective  Patient seen this morning. Doing well. No acute complaints	    ROS:  General:  (-)Pain, (-)Decrease appetite, (-)Fevers, (-)Chills, (-)Weight Loss  Eyes: (-)Blurry Vision, (-)Double Vision, (-)Vision Loss  ENT: (-)Sinus Congestion, (-)Decreased Hearing, (-)Nosebleeds, (-)Sore Throat  Cardiac: (-)Chest Pain, (-)Palpitations, (-)Shortness of breathe on exertion  Respiratory: (-)Cough, (-)hemoptysis, (-)Shortness of breathe  GI: (-)Nausea, (-)Vomiting, (-)Diarrhea, (-)Constipation, (+)Melena, (-)BRBPR  : (-)Hematuria, (-)Dysuria, (-)Polyuia  MSK: (-)Back pain, (-)Joint pain, (-)Stiffness  Dermatology: (-)Rash, (-)Itching  Neurology:  (-)Numbness, (-)Tingling, (-)Difficulty Walking, (-)Tremors, (-)Weakness  Psych: (-)Anxiety, (-)Depression, (-)Memory Loss  Hematology:  (-)Easy Bruising, (-)Easy Bleeding, (-)Night Sweats.     MEDICATIONS  (STANDING):  amLODIPine   Tablet 2.5 milliGRAM(s) Oral daily  ascorbic acid 500 milliGRAM(s) Oral daily  aspirin enteric coated 81 milliGRAM(s) Oral daily  atorvastatin 40 milliGRAM(s) Oral at bedtime  carvedilol 6.25 milliGRAM(s) Oral every 12 hours  dextrose 5% + sodium chloride 0.45%. 500 milliLiter(s) (50 mL/Hr) IV Continuous <Continuous>  enoxaparin Injectable 40 milliGRAM(s) SubCutaneous daily  isosorbide   mononitrate ER Tablet (IMDUR) 30 milliGRAM(s) Oral daily  lisinopril 5 milliGRAM(s) Oral daily  multivitamin 1 Tablet(s) Oral daily    Allergies  No Known Allergies    Vital Signs Last 24 Hrs  T(C): 36.9 (24 Nov 2017 05:52), Max: 36.9 (24 Nov 2017 05:52)  T(F): 98.4 (24 Nov 2017 05:52), Max: 98.4 (24 Nov 2017 05:52)  HR: 81 (24 Nov 2017 07:23) (80 - 84)  BP: 137/75 (24 Nov 2017 07:23) (122/78 - 196/91)  RR: 18 (24 Nov 2017 05:52) (18 - 18)  SpO2: 100% (24 Nov 2017 05:52) (94% - 100%)    Physical Exam:  General: AAO x 3. NAD. Sitting in chair comfortably. Hard of hearing.   HEENT: clear oropharynx, anicteric sclera, pink conjunctivae, EOMi. PERRLA  Cardiac: S1, S2 present. No audible murmurs. RRR  Lungs: CTA B/L.   Abdomen: Soft, Non-Tender, Non-Distended. No palpable hepatosplenomegaly. Healing laparoscopy scars   Extremities: 2+ pulses. No edema  Skin: No Rashes. No petechiae  Neuro: No focal motor or sensory deficits.     Labs:                        10.7   11.91 )-----------( 287      ( 23 Nov 2017 09:54 )             34.4   11-23    137  |  102  |  11  ----------------------------<  121<H>  4.7   |  26  |  1.14    Ca    8.1<L>      23 Nov 2017 09:54  Phos  2.2     11-23  Mg     2.0     11-23    Radiology:  Colonoscopy  Impression:          - Likely malignant partially obstructingtumor in the ascending colon.                        Biopsied. Tattooed.                       - Diverticulosis in the sigmoid colon, in the descending colon, in the                        transverse colon and in the ascending colon.       - The examined portion of the ileum was normal.                       - Internal hemorrhoids.  Recommendation:      - Return patient to [facility] for [facility] care.                       - Full liquid diet today.                       - F/U pathology                       - Continue to hold ASA/Plavix if possible                       - CT chest/ abdomen / pelvis for staging purposes    CT Chest/Abdomen/Pelvis  - Isolated Lesion. No metastatic lesions seen.     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    Note: Dr. Hayes has reviewed this case and concurred the diagnosis.    Verified by: Kristen Dasilva M.D.  (Electronic Signature)  Reported on: 11/14/17 17:57 EST,[address] [place], [place] [id number]

## 2017-11-28 NOTE — PROGRESS NOTE ADULT - PROVIDER SPECIALTY LIST ADULT
Cardiology
Gastroenterology
Heme/Onc
Internal Medicine
Surgery
Internal Medicine

## 2017-12-04 ENCOUNTER — APPOINTMENT (OUTPATIENT)
Dept: SURGICAL ONCOLOGY | Facility: CLINIC | Age: 82
End: 2017-12-04
Payer: MEDICARE

## 2017-12-04 VITALS
DIASTOLIC BLOOD PRESSURE: 69 MMHG | OXYGEN SATURATION: 99 % | HEART RATE: 59 BPM | BODY MASS INDEX: 25.35 KG/M2 | WEIGHT: 136 LBS | HEIGHT: 61.5 IN | SYSTOLIC BLOOD PRESSURE: 163 MMHG

## 2017-12-04 DIAGNOSIS — K31.9 DISEASE OF STOMACH AND DUODENUM, UNSPECIFIED: ICD-10-CM

## 2017-12-04 DIAGNOSIS — Z85.038 PERSONAL HISTORY OF OTHER MALIGNANT NEOPLASM OF LARGE INTESTINE: ICD-10-CM

## 2017-12-04 DIAGNOSIS — Z86.79 PERSONAL HISTORY OF OTHER DISEASES OF THE CIRCULATORY SYSTEM: ICD-10-CM

## 2017-12-04 PROCEDURE — 99024 POSTOP FOLLOW-UP VISIT: CPT

## 2017-12-14 PROBLEM — K31.9 STOMACH PROBLEMS: Status: RESOLVED | Noted: 2017-12-04 | Resolved: 2017-12-14

## 2017-12-14 PROBLEM — Z85.038 HISTORY OF MALIGNANT NEOPLASM OF COLON: Status: RESOLVED | Noted: 2017-12-04 | Resolved: 2017-12-14

## 2017-12-14 PROBLEM — Z86.79 HISTORY OF HYPERTENSION: Status: RESOLVED | Noted: 2017-12-04 | Resolved: 2017-12-14

## 2017-12-19 PROCEDURE — 36430 TRANSFUSION BLD/BLD COMPNT: CPT

## 2017-12-19 PROCEDURE — 97161 PT EVAL LOW COMPLEX 20 MIN: CPT

## 2017-12-19 PROCEDURE — 86900 BLOOD TYPING SEROLOGIC ABO: CPT

## 2017-12-19 PROCEDURE — 82330 ASSAY OF CALCIUM: CPT

## 2017-12-19 PROCEDURE — 97116 GAIT TRAINING THERAPY: CPT

## 2017-12-19 PROCEDURE — 86901 BLOOD TYPING SEROLOGIC RH(D): CPT

## 2017-12-19 PROCEDURE — 83690 ASSAY OF LIPASE: CPT

## 2017-12-19 PROCEDURE — 85730 THROMBOPLASTIN TIME PARTIAL: CPT

## 2017-12-19 PROCEDURE — 80053 COMPREHEN METABOLIC PANEL: CPT

## 2017-12-19 PROCEDURE — 84436 ASSAY OF TOTAL THYROXINE: CPT

## 2017-12-19 PROCEDURE — 80048 BASIC METABOLIC PNL TOTAL CA: CPT

## 2017-12-19 PROCEDURE — 86780 TREPONEMA PALLIDUM: CPT

## 2017-12-19 PROCEDURE — 82607 VITAMIN B-12: CPT

## 2017-12-19 PROCEDURE — 82272 OCCULT BLD FECES 1-3 TESTS: CPT

## 2017-12-19 PROCEDURE — 74177 CT ABD & PELVIS W/CONTRAST: CPT

## 2017-12-19 PROCEDURE — 85610 PROTHROMBIN TIME: CPT

## 2017-12-19 PROCEDURE — 86923 COMPATIBILITY TEST ELECTRIC: CPT

## 2017-12-19 PROCEDURE — P9016: CPT

## 2017-12-19 PROCEDURE — 84443 ASSAY THYROID STIM HORMONE: CPT

## 2017-12-19 PROCEDURE — 81003 URINALYSIS AUTO W/O SCOPE: CPT

## 2017-12-19 PROCEDURE — 71260 CT THORAX DX C+: CPT

## 2017-12-19 PROCEDURE — 85027 COMPLETE CBC AUTOMATED: CPT

## 2017-12-19 PROCEDURE — 86850 RBC ANTIBODY SCREEN: CPT

## 2017-12-19 PROCEDURE — 83735 ASSAY OF MAGNESIUM: CPT

## 2017-12-19 PROCEDURE — 83550 IRON BINDING TEST: CPT

## 2017-12-19 PROCEDURE — 99285 EMERGENCY DEPT VISIT HI MDM: CPT | Mod: 25

## 2017-12-19 PROCEDURE — 97530 THERAPEUTIC ACTIVITIES: CPT

## 2017-12-19 PROCEDURE — 82728 ASSAY OF FERRITIN: CPT

## 2017-12-19 PROCEDURE — 82378 CARCINOEMBRYONIC ANTIGEN: CPT

## 2017-12-19 PROCEDURE — 93005 ELECTROCARDIOGRAM TRACING: CPT

## 2017-12-19 PROCEDURE — 84100 ASSAY OF PHOSPHORUS: CPT

## 2017-12-26 ENCOUNTER — APPOINTMENT (OUTPATIENT)
Dept: SURGICAL ONCOLOGY | Facility: CLINIC | Age: 82
End: 2017-12-26

## 2018-01-04 PROBLEM — C18.9 COLON CANCER: Status: ACTIVE | Noted: 2017-12-14

## 2018-05-29 NOTE — PROGRESS NOTE ADULT - ATTENDING COMMENTS
Oriented - self; Oriented - place; Oriented - time - Dr. JACQUI Solano ([facility])  - [phone]

## 2020-09-18 NOTE — PACU DISCHARGE NOTE - AIRWAY PATENCY:
Satisfactory [Joint Pain] : joint pain [Fever] : no fever [Chills] : no chills [Eye Pain] : no eye pain [Eyesight Problems] : no eyesight problems [Earache] : no earache [Loss Of Hearing] : no hearing loss [Nasal Discharge] : no nasal discharge [Sore Throat] : no sore throat [Chest Pain] : no chest pain [Palpitations] : no palpitations [Leg Claudication] : no intermittent leg claudication [Shortness Of Breath] : no shortness of breath [Cough] : no cough [Orthopnea] : no orthopnea [Abdominal Pain] : no abdominal pain [Vomiting] : no vomiting [Constipation] : no constipation [Diarrhea] : no diarrhea [Dysuria] : no dysuria [Incontinence] : no incontinence [Vaginal Discharge] : no vaginal discharge [Joint Swelling] : no joint swelling [Dizziness] : no dizziness [Fainting] : no fainting [de-identified] : rash on right ankle [de-identified] : no headache

## 2022-08-11 ENCOUNTER — OFFICE VISIT (OUTPATIENT)
Dept: URBAN - METROPOLITAN AREA CLINIC 18 | Facility: CLINIC | Age: 87
End: 2022-08-11
Payer: MEDICARE

## 2022-08-11 DIAGNOSIS — H02.825 CYSTS OF LEFT LOWER EYELID: Primary | ICD-10-CM

## 2022-08-11 DIAGNOSIS — H02.834 DERMATOCHALASIS OF LEFT UPPER EYELID: ICD-10-CM

## 2022-08-11 DIAGNOSIS — H02.831 DERMATOCHALASIS OF RIGHT UPPER EYELID: ICD-10-CM

## 2022-08-11 PROCEDURE — 99203 OFFICE O/P NEW LOW 30 MIN: CPT | Performed by: OPTOMETRIST

## 2022-08-11 ASSESSMENT — INTRAOCULAR PRESSURE
OD: 11
OS: 12

## 2022-08-11 NOTE — IMPRESSION/PLAN
Impression: Dermatochalasis of right upper eyelid: H02.831. Plan: Discussed diagnosis in detail with patient. No treatment is required at this time. Will continue to observe condition and or symptoms. Patient instructed to call if condition gets worse.  Order Refraction per patient request.

## 2022-08-11 NOTE — IMPRESSION/PLAN
Impression: Dermatochalasis of left upper eyelid: H02.834. Plan: Discussed diagnosis in detail with patient. No treatment is required at this time. Will continue to observe condition and or symptoms. Patient instructed to call if condition gets worse.  Order Refraction per patient request.

## 2022-08-11 NOTE — IMPRESSION/PLAN
Impression: Cysts of left lower eyelid: H02.825. Plan: cyst LLL temporal. Recommend consult with dermatology for removal or biopsy.

## 2023-07-13 NOTE — PROGRESS NOTE ADULT - ASSESSMENT
89M S/p Lap R colectomy    Pain control  (No Toradol)  Physical Therapy Consult/ OOB  Monitor U/O  Follow AM Labs  Await Return of GI Function.   Sips. Ice Chips allowed      C Trent PA  [facility]      From Attending Dr Flores at 640am.     Awake, appropriate responses to questions today.  uo adequate  OOB and amb today -- PT consult.  Pt will likely need Rehab  Cont IVF  Await flatus  Cont IV tylenol around the clock  ice chips, sips of water today . Awake, appropriate responses to questions today. Patient seen and examined, agree with plan above.

## 2024-08-05 NOTE — DISCHARGE NOTE ADULT - NSCORESITESY/N_GEN_A_CORE_RD
BRONCHOSPASM/BRONCHOCONSTRICTION     [x]         IMPROVE AERATION/BREATH SOUNDS  [x]   ADMINISTER BRONCHODILATOR THERAPY AS APPROPRIATE  [x]   ASSESS BREATH SOUNDS  []   IMPLEMENT AEROSOL/MDI PROTOCOL  [x]   PATIENT EDUCATION AS NEEDED      PROVIDE ADEQUATE OXYGENATION WITH ACCEPTABLE SP02/ABG'S    [x]  IDENTIFY APPROPRIATE OXYGEN THERAPY  [x]   MONITOR SP02/ABG'S AS NEEDED   [x]   PATIENT EDUCATION AS NEEDED     No
